# Patient Record
Sex: MALE | Race: WHITE | Employment: FULL TIME | URBAN - METROPOLITAN AREA
[De-identification: names, ages, dates, MRNs, and addresses within clinical notes are randomized per-mention and may not be internally consistent; named-entity substitution may affect disease eponyms.]

---

## 2017-05-24 ENCOUNTER — OFFICE VISIT (OUTPATIENT)
Dept: ORTHOPEDIC SURGERY | Age: 47
End: 2017-05-24

## 2017-05-24 VITALS
SYSTOLIC BLOOD PRESSURE: 124 MMHG | DIASTOLIC BLOOD PRESSURE: 92 MMHG | HEIGHT: 69 IN | HEART RATE: 90 BPM | BODY MASS INDEX: 31.1 KG/M2 | WEIGHT: 210 LBS

## 2017-05-24 DIAGNOSIS — M25.551 HIP PAIN, RIGHT: ICD-10-CM

## 2017-05-24 DIAGNOSIS — M16.11 PRIMARY OSTEOARTHRITIS OF RIGHT HIP: ICD-10-CM

## 2017-05-24 DIAGNOSIS — M76.31 ILIOTIBIAL BAND SYNDROME, RIGHT: Primary | ICD-10-CM

## 2017-05-24 PROBLEM — M76.30 ILIOTIBIAL BAND SYNDROME: Status: ACTIVE | Noted: 2017-05-24

## 2017-05-24 PROCEDURE — 73502 X-RAY EXAM HIP UNI 2-3 VIEWS: CPT | Performed by: PHYSICIAN ASSISTANT

## 2017-05-24 PROCEDURE — 99213 OFFICE O/P EST LOW 20 MIN: CPT | Performed by: PHYSICIAN ASSISTANT

## 2017-06-19 ENCOUNTER — OFFICE VISIT (OUTPATIENT)
Dept: ORTHOPEDIC SURGERY | Age: 47
End: 2017-06-19

## 2017-06-19 VITALS — HEIGHT: 69 IN | BODY MASS INDEX: 31.12 KG/M2 | WEIGHT: 210.1 LBS

## 2017-06-19 DIAGNOSIS — Z96.641 HISTORY OF TOTAL HIP ARTHROPLASTY, RIGHT: ICD-10-CM

## 2017-06-19 DIAGNOSIS — M24.552 HIP FLEXOR TIGHTNESS, LEFT: Primary | ICD-10-CM

## 2017-06-19 DIAGNOSIS — M16.11 PRIMARY OSTEOARTHRITIS OF RIGHT HIP: ICD-10-CM

## 2017-06-19 PROCEDURE — 99213 OFFICE O/P EST LOW 20 MIN: CPT | Performed by: ORTHOPAEDIC SURGERY

## 2017-06-19 RX ORDER — DEXAMETHASONE SODIUM PHOSPHATE 4 MG/ML
INJECTION, SOLUTION INTRA-ARTICULAR; INTRALESIONAL; INTRAMUSCULAR; INTRAVENOUS; SOFT TISSUE
Qty: 30 ML | Refills: 0 | Status: SHIPPED | OUTPATIENT
Start: 2017-06-19 | End: 2017-07-06

## 2017-06-20 ENCOUNTER — HOSPITAL ENCOUNTER (OUTPATIENT)
Dept: PHYSICAL THERAPY | Age: 47
Discharge: HOME | End: 2017-06-30
Admitting: ORTHOPAEDIC SURGERY

## 2017-07-03 ENCOUNTER — HOSPITAL ENCOUNTER (OUTPATIENT)
Dept: PHYSICAL THERAPY | Age: 47
Discharge: HOME | End: 2017-07-03
Admitting: ORTHOPAEDIC SURGERY

## 2017-07-06 DIAGNOSIS — M24.552 HIP FLEXOR TIGHTNESS, LEFT: Primary | ICD-10-CM

## 2017-07-06 RX ORDER — DEXAMETHASONE SODIUM PHOSPHATE 4 MG/ML
INJECTION, SOLUTION INTRA-ARTICULAR; INTRALESIONAL; INTRAMUSCULAR; INTRAVENOUS; SOFT TISSUE
Qty: 30 ML | Refills: 0 | Status: SHIPPED | OUTPATIENT
Start: 2017-07-06

## 2017-07-07 ENCOUNTER — HOSPITAL ENCOUNTER (OUTPATIENT)
Dept: PHYSICAL THERAPY | Age: 47
Discharge: HOME | End: 2017-07-07
Admitting: ORTHOPAEDIC SURGERY

## 2017-07-11 ENCOUNTER — HOSPITAL ENCOUNTER (OUTPATIENT)
Dept: PHYSICAL THERAPY | Age: 47
Discharge: HOME | End: 2017-07-11
Admitting: ORTHOPAEDIC SURGERY

## 2017-07-20 ENCOUNTER — HOSPITAL ENCOUNTER (OUTPATIENT)
Dept: PHYSICAL THERAPY | Age: 47
Discharge: HOME | End: 2017-07-20
Admitting: ORTHOPAEDIC SURGERY

## 2017-08-01 ENCOUNTER — HOSPITAL ENCOUNTER (OUTPATIENT)
Dept: PHYSICAL THERAPY | Age: 47
Discharge: HOME | End: 2017-08-01
Admitting: ORTHOPAEDIC SURGERY

## 2017-09-06 ENCOUNTER — HOSPITAL ENCOUNTER (OUTPATIENT)
Dept: PHYSICAL THERAPY | Age: 47
Discharge: HOME | End: 2017-09-30
Attending: ORTHOPAEDIC SURGERY | Admitting: ORTHOPAEDIC SURGERY

## 2017-09-06 NOTE — FLOWSHEET NOTE
Knee Extension Bilat. Ecc.                                Inv. Hamstring Curls Bilat. Ecc.                                Inv.          Soleus Press Bilat. Ecc.                            Inv.                                Ladders                 Square                Jump/Hop  Low                       Med.                       High                                                               Modality Ionto 120 mA to go  Proximal rectus Ionto 120 mA to go  Proximal rectus   Initials                             BMG LCG

## 2017-09-06 NOTE — FLOWSHEET NOTE
[facility], [address] [place]  Phone: [phone]  Fax [phone]    Physical Therapy Daily Treatment Note  Date:  2017    Patient Name:  Gila Squires    :  1970  MRN: [id number]  Restrictions/Precautions:    Medical/Treatment Diagnosis Information:  · Diagnosis: M16.11 (ICD-10-CM) - Primary osteoarthritis of right hip, Z96.641 (ICD-10-CM) - History of total hip arthroplasty, right, M24.552 (ICD-10-CM) - Hip flexor tightness, left  · Treatment Diagnosis: R hip pain P78.205  Insurance/Certification information:  PT Insurance Information: P  Physician Information:  Referring Practitioner: Gifty Menendez of care signed (Y/N):     Date of Patient follow up with Physician:     G-Code (if applicable):   Abhishek Brown   Date G-Code Applied:  17  PT G-Codes  Functional Assessment Tool Used: LEFS   Score: 28%  Functional Limitation: Mobility: Walking and moving around  Mobility: Walking and Moving Around Current Status (): At least 20 percent but less than 40 percent impaired, limited or restricted  Mobility: Walking and Moving Around Goal Status (): At least 1 percent but less than 20 percent impaired, limited or restricted    Progress Note: [x]  Yes  []  No  Next due by: Visit #10       Latex Allergy:  [x]NO      []YES  Preferred Language for Healthcare:   [x]English       []other:    Visit # Insurance Allowable   8 54     Pain level:  0-5 or 6 following golfing    SUBJECTIVE:  Pt feels his pain has gotten slightly worse again, despite feeling so good ~1 month ago. He isn't having pain going up/down steps or at rest, but mild pulling felt when walking ~1 mile and after golfing (he's back to ~2 to sometimes 3 days/week). He feels he's stopped being as consistent with his HEP (is still stretching and foam rolling) but is having trouble correctly performing his strengthening exercises consistently.  Pain is worse following activity, and improves with stretching and rest from golf/walking. He did get a gym membership so will have access to more machines as well. He would like another review of his HEP.     OBJECTIVE:   Observation: TTP proximal to mid rectus, psoas and iliacus, TFL   Test measurements:      RESTRICTIONS/PRECAUTIONS: hx R DHRUV (ant approach) 2015, hx posterior lumbar fusion 1999    Exercises/Interventions:     Therapeutic Ex Sets/sec Reps Notes   Pt ed: e                     R/L for HEP   3 way hip flexor [id number]\" x3 R/L ea on true-stretch today   Prone TrA with B hip abd  \" \"  with B hip abd/ER GVL   Prone plank with alt LE ext  Prone plank with alt LE hip flex/ERx10 R/L      SL plank with clamshell GVL   Side-stepping  In mini squat  Monster walks  Retro monster walks20'x2 ea GVL   Sl hip abd-rishi wall [id number] R/L    Bridges  SL bridges3\"x10 R/L    Bridge on ball   Bridge on ball with alt LE lift NV   Glider hip abd, D2 diagonal ext  2x10  GVL   Standing hip abd R/L   Initially red tband but stopped d/t inc'd hip flex/IR with fatigue   Foam Roller quads, ITB   manual   SLS hip hike 4\"2x10 R/L     Standing hip abd/ER iso into wall 10\"x5 R/L  Mirror for posture/not leaning   Quadruped firehydrant      Manual Intervention      Psoas MFR 1'x3     TPR R iliacus, TFL,  5'     STM proximal rectus 5'     stick rolling proximal to mid ITB, VL 5'     ART psoas, iliacus 2x30\" ea    Manual HS stretch  SL hip flexor stretch  Prone quad stretch-knee on 1/2 foam roll 30\"x330\"x3    NMR re-education      Multifidus lift-quadruped on 1/2 foam roll R,L; cueing for slight ant tilt for inc'd MfA activation   Prone TKE glute re-training 2 10 R,L   Prone alt LE ext/\"elongation\" 2x20                         Therapeutic Exercise and NMR EXR  [x] (14844) Provided verbal/tactile cueing for activities related to strengthening, flexibility, endurance, ROM for improvements in LE, proximal hip, and core control with self care, mobility, [] EVAL (LOW) 11305 (typically 20 minutes face-to-face)  [] EVAL (MOD) 67319 (typically 30 minutes face-to-face)  [] EVAL (HIGH) 63539 (typically 45 minutes face-to-face)  [] RE-EVAL     [x] GW(54626) x  1   [x] IONTO  [] NMR (72341) x      [] VASO  [x] Manual (06568) x  2    [] Other:  [] TA x       [] Mech Traction (46884)  [] ES(attended) (73307)      [] ES (un) (20519):     GOALS: Short Term Goals: To be achieved in: 2 weeks  1. Independent in HEP and progression per patient tolerance, in order to prevent re-injury. 2. Patient will have a decrease in pain to facilitate improvement in movement, function, and ADLs as indicated by Functional Deficits.     Long Term Goals: To be achieved in: 5-6 weeks  1. Disability index score of 10% or less for the LEFS to assist with reaching prior level of function. 2. Patient will demonstrate increased flexibility R hip flexors/quadriceps to allow for proper joint functioning as indicated by patients Functional Deficits. 3. Patient will demonstrate an increase in Strength to good proximal hip strength and control, within 5lb HHD in LE to allow for proper functional mobility as indicated by patients Functional Deficits: ability to ascend/descend steps with reciprocal pattern without limitation. 4. Patient will return to ambulating >10,000 steps/day without increased symptoms or restriction. 5. Pt will ambulate on hills/inclines without symptoms or restriction for ability to participate in 18 holes of golf. Progression Towards Functional goals:  [x] Patient is progressing as expected towards functional goals listed. [] Progression is slowed due to complexities listed. [] Progression has been slowed due to co-morbidities. [] Plan just implemented, too soon to assess goals progression  [] Other:     ASSESSMENT:  Pt with worse pain following activity (golf, mild inc with walking), inc'd c/o tightness in R anterior hip and prox mid to lat thigh.  His symptoms were much improved ~1 month ago; pt feels he's stopped being as consistent with HEP for flexibility and strength and started inc'd frequency of golfing and feels this is responsible for his flare-up. Pt will benefit from continued focus on flexibility and foam rolling, and reviewed and gave updated HEP for inc'd consistency of core/glute strengthening. Pt still tends to compensate with hip flexors with there-ex when he fatigues and needs consistent cueing for proper performance of there-ex. He has gotten relief in past sessions with manual tx, flexibility, glute/core-specific exercises and ionto over proximal rectus tendon, however if pain is persisting with inc'd focus on HEP, he may benefit from trial of TDN to improve anterior hip comfort. Pt is agreeable to plan, so will ask MD for referral.    Treatment/Activity Tolerance:  [x] Patient tolerated treatment well [] Patient limited by fatique  [] Patient limited by pain  [] Patient limited by other medical complications  [] Other:     Prognosis: [x] Good [] Fair  [] Poor    Patient Requires Follow-up: [x] Yes  [] No    PLAN: Ask for script for TDN. Progress R hip flexor flexibility, continue strength of hip rotators, abductors/extensors and con-contraction of MfA and TrA.   [x] Continue per plan of care [] Alter current plan (see comments)  [] Plan of care initiated [] Hold pending MD visit [] Discharge    Electronically signed by: Denver Bjork, PT, DPT

## 2017-10-03 ENCOUNTER — HOSPITAL ENCOUNTER (OUTPATIENT)
Dept: PHYSICAL THERAPY | Age: 47
Discharge: HOME | End: 2017-10-03
Admitting: ORTHOPAEDIC SURGERY

## 2017-10-03 DIAGNOSIS — M24.552 HIP FLEXOR TIGHTNESS, LEFT: Primary | ICD-10-CM

## 2017-10-03 NOTE — FLOWSHEET NOTE
[facility], [address]  Phone: [phone]  Fax [phone]    Physical Therapy Daily Treatment Note  Date:  10/3/2017    Patient Name:  Heena Gomez    :  1970  MRN: [id number]  Restrictions/Precautions:    Medical/Treatment Diagnosis Information:  · Diagnosis: M16.11 (ICD-10-CM) - Primary osteoarthritis of right hip, Z96.641 (ICD-10-CM) - History of total hip arthroplasty, right, M24.552 (ICD-10-CM) - Hip flexor tightness, left  · Treatment Diagnosis: R hip pain K65.268  Insurance/Certification information:  PT Insurance Information: P  Physician Information:  Referring Practitioner: Shala Bernard of care signed (Y/N):     Date of Patient follow up with Physician:     G-Code (if applicable):   Joby Max   Date G-Code Applied:  17  PT G-Codes  Functional Assessment Tool Used: LEFS   Score: 28%  Functional Limitation: Mobility: Walking and moving around  Mobility: Walking and Moving Around Current Status (): At least 20 percent but less than 40 percent impaired, limited or restricted  Mobility: Walking and Moving Around Goal Status (): At least 1 percent but less than 20 percent impaired, limited or restricted    Progress Note: [x]  Yes  []  No  Next due by: Visit #10       Latex Allergy:  [x]NO      []YES  Preferred Language for Healthcare:   [x]English       []other:    Visit # Insurance Allowable   9-prog note NV 54     Pain level:  1-2 after walking/golfing, but mod to severe pain following walking/standing 12 hours for 3 days in a row at work conference    SUBJECTIVE:  Pt reports he's been feeling well past few weeks since LV with HEP for strength/stretching and foam rolling. He was walking ~1-3 miles daily without inc'd pain, just tighthness in R anterior hip and low back after activity.  However, he had work conference 1 week ago where he had to walk and stand on concrete for 12 hours for 3 (09192) Provided manual therapy to mobilize LE, proximal hip and/or LS spine soft tissue/joints for the purpose of modulating pain, promoting relaxation,  increasing ROM, reducing/eliminating soft tissue swelling/inflammation/restriction, improving soft tissue extensibility and allowing for proper ROM for normal function with self care, mobility, lifting and ambulation. Modalities:  ionto \"to go\" patch 120 proximal rectus (5' set-up)    Charges:  Timed Code Treatment Minutes: 45   Total Treatment Minutes: 50 (ionto)     [] EVAL (LOW) 86497 (typically 20 minutes face-to-face)  [] EVAL (MOD) 78878 (typically 30 minutes face-to-face)  [] EVAL (HIGH) 64605 (typically 45 minutes face-to-face)  [] RE-EVAL     [x] EI(27490) x  1   [x] IONTO  [] NMR (32920) x      [] VASO  [x] Manual (30464) x  2    [] Other:  [] TA x       [] Mech Traction (29225)  [] ES(attended) (07365)      [] ES (un) (60826):     GOALS: Short Term Goals: To be achieved in: 2 weeks  1. Independent in HEP and progression per patient tolerance, in order to prevent re-injury. 2. Patient will have a decrease in pain to facilitate improvement in movement, function, and ADLs as indicated by Functional Deficits.     Long Term Goals: To be achieved in: 5-6 weeks  1. Disability index score of 10% or less for the LEFS to assist with reaching prior level of function. 2. Patient will demonstrate increased flexibility R hip flexors/quadriceps to allow for proper joint functioning as indicated by patients Functional Deficits. 3. Patient will demonstrate an increase in Strength to good proximal hip strength and control, within 5lb HHD in LE to allow for proper functional mobility as indicated by patients Functional Deficits: ability to ascend/descend steps with reciprocal pattern without limitation. 4. Patient will return to ambulating >10,000 steps/day without increased symptoms or restriction.    5. Pt will ambulate on hills/inclines without symptoms or restriction for ability to participate in 18 holes of golf. Progression Towards Functional goals:  [x] Patient is progressing as expected towards functional goals listed. [] Progression is slowed due to complexities listed. [] Progression has been slowed due to co-morbidities. [] Plan just implemented, too soon to assess goals progression  [] Other:     ASSESSMENT:  Pt again continues to have flare-ups of pain in R hip flexor after inc'd/prolonged activity, this time was standing/walking for 12 hour days, 3 days in a row. Pt reported that he only had 1 day of severe soreness in low back/R and and lat hip, and he improved quickly, but he's discouraged that he continues to have worsening pain. Educated pt that he has made excellent progress with pain and is doing better with HEP for strength and flexibility, but he can't tolerated excessive activity (as mentioned above) without expectation for some temporary inc'd inflammation. Given his hx of lumbar fusion and R DHRUV, he will likely need to continue with his consistency with his HEP and on the days he does need to perform prolonged standing/walking, he will need to make a point of more frequent rest breaks, options to sit, and stretching/foam rolling through the day. This visit, pt was more sore in R obliques, QL, and R glutes, so also worked on these muscle groups as well to aide recovery. Continued to recommend trial of TDN to improve anterior hip comfort. Treatment/Activity Tolerance:  [x] Patient tolerated treatment well [] Patient limited by fatique  [] Patient limited by pain  [] Patient limited by other medical complications  [] Other:     Prognosis: [x] Good [] Fair  [] Poor    Patient Requires Follow-up: [x] Yes  [] No    PLAN: Script request has been made to MD for TDN. Progress R hip flexor flexibility, continue strength of hip rotators, abductors/extensors and con-contraction of MfA and TrA.   [x] Continue per plan of care [] Alter current

## 2017-10-25 ENCOUNTER — HOSPITAL ENCOUNTER (OUTPATIENT)
Dept: PHYSICAL THERAPY | Age: 47
Discharge: HOME | End: 2017-10-25
Admitting: ORTHOPAEDIC SURGERY

## 2017-10-25 NOTE — FLOWSHEET NOTE
does extra walking/golf as reason his pain flares up (when this happens, inc'd rest, ice, gentle stretching and avoiding inc'd walking/biking/resisted hip flexion activities until pain is better again) 10'     Foam rolling TFL, quad, ITB, R glutes 5'              R/L for HEP   3 way hip flexor stretch Review for HEP      Prone TrA with B hip abd  \" \"  with B hip abd/ER GVL   Prone plank with alt LE ext  Prone plank with alt LE hip flex/ER    SL clamshell GVL   Side-stepping  In mini squat  Monster walks  Retro monster walks GVL  Review for HEP   Sl hip abd-rishi wall slide    Bridges  SL bridges with hip abd BVL BVL   Bridge on ball   Bridge on ball with alt LE lift NV   Glider hip abd, D2 diagonal ext  GVL   Standing hip abd R/L   Initially red tband but stopped d/t inc'd hip flex/IR with fatigue   Foam Roller quads, ITB   manual   SLS hip hike  Review for HEP   Standing hip abd/ER iso into wall  Review for HEP  Mirror for posture/not leaning   Quadruped firehydrant      Manual Intervention      Psoas MFR 1'x3     TPR R iliacus, TFL, glute min and med 15'     STM proximal rectus  STM distal QL along iliac crest and obliques 10'     stick rolling proximal to mid ITB, VL   Pt performed following manual tx   ART R hip IR/ERs 3x30\" ea    Manual HS stretch  SL hip flexor stretch  Prone quad stretch-knee on 1/2 foam roll     NMR re-education      Multifidus lift-quadruped on 1/2 foam roll R,L; cueing for slight ant tilt for inc'd MfA activation   Prone TKE glute re-training R,L   Prone alt LE ext/\"elongation\"                          Therapeutic Exercise and NMR EXR  [x] (23145) Provided verbal/tactile cueing for activities related to strengthening, flexibility, endurance, ROM for improvements in LE, proximal hip, and core control with self care, mobility, lifting, ambulation.   [x] (94759) Provided verbal/tactile cueing for activities related to improving balance, coordination, kinesthetic sense, posture, motor skill, IONTO  [] NMR (96741) x      [] VASO  [x] Manual (58066) x  2    [] Other:  [] TA x       [] Mech Traction (64071)  [] ES(attended) (18760)      [] ES (un) (73875):     GOALS: Short Term Goals: To be achieved in: 2 weeks  1. Independent in HEP and progression per patient tolerance, in order to prevent re-injury. 2. Patient will have a decrease in pain to facilitate improvement in movement, function, and ADLs as indicated by Functional Deficits.     Long Term Goals: To be achieved in: 5-6 weeks  1. Disability index score of 10% or less for the LEFS to assist with reaching prior level of function. 2. Patient will demonstrate increased flexibility R hip flexors/quadriceps to allow for proper joint functioning as indicated by patients Functional Deficits. 3. Patient will demonstrate an increase in Strength to good proximal hip strength and control, within 5lb HHD in LE to allow for proper functional mobility as indicated by patients Functional Deficits: ability to ascend/descend steps with reciprocal pattern without limitation. 4. Patient will return to ambulating >10,000 steps/day without increased symptoms or restriction. 5. Pt will ambulate on hills/inclines without symptoms or restriction for ability to participate in 18 holes of golf. Progression Towards Functional goals:  [x] Patient is progressing as expected towards functional goals listed. [] Progression is slowed due to complexities listed. [] Progression has been slowed due to co-morbidities. [] Plan just implemented, too soon to assess goals progression  [] Other:     ASSESSMENT:  See prog note    Treatment/Activity Tolerance:  [x] Patient tolerated treatment well [] Patient limited by fatique  [] Patient limited by pain  [] Patient limited by other medical complications  [] Other:     Prognosis: [x] Good [] Fair  [] Poor    Patient Requires Follow-up: [x] Yes  [] No    PLAN: Has script from MD for TDN.  Pt will continue HEP for stretching and strengthening and f/u in PT for TDN and STM prn to manage soft tissue discomfort following inc'd activity.   [x] Continue per plan of care [x] Alter current plan (see comments)  [] Plan of care initiated [] Hold pending MD visit [] Discharge    Electronically signed by: Toro Sarah, PT, DPT

## 2017-10-25 NOTE — PROGRESS NOTES
[facility], [address], [address] [place] [address]  Phone: [phone]  Fax [phone]     Physical Therapy Re-Certification Plan of Care    Dear Referring Practitioner: Conchis Dodson,    We had the pleasure of treating the following patient for physical therapy services at [address] [place] [address]. A summary of our findings can be found in the updated assessment below. This includes our plan of care. If you have any questions or concerns regarding these findings, please do not hesitate to contact me at the office phone number checked above. Thank you for the referral.     Physician Signature:________________________________Date:__________________  By signing above, therapists plan is approved by physician      Patient: Claribel Donald   : 1970   MRN: [id number]  Referring Physician: Referring Practitioner: Conchis Dodson      Evaluation Date: 10/25/2017      Medical Diagnosis Information:  · Diagnosis: M16.11 (ICD-10-CM) - Primary osteoarthritis of right hip, Z96.641 (ICD-10-CM) - History of total hip arthroplasty, right, M24.552 (ICD-10-CM) - Hip flexor tightness, left   · Treatment Diagnosis: R hip pain M25.551   Insurance information: PT Insurance Information: [facility]    Date Range:17-10/25/17  Total visits:10      G-Codes: (if applicable) PT G-Codes  Functional Assessment Tool Used: LEFS  Functional Limitation: Mobility: Walking and moving around  Mobility: Walking and Moving Around Current Status (): At least 1 percent but less than 20 percent impaired, limited or restricted  Mobility: Walking and Moving Around Goal Status (): At least 1 percent but less than 20 percent impaired, limited or restricted   Functional Index used: LEFS    SUBJECTIVE: Pt has been walking 1-2 miles daily, working out at gym 3+x/week for core and LE strength (and bike, elliptical) and playing golf 1x/week.  He came back to PT today d/t inc'd anterior hip soreness after playing 18 holes 3 days in a row. He is sore again with going up steps, biking, and walking and is frustrated that his pain flares up when he does way more than his normal activities. Current Pain Scale: 0 past 4-5 sessions over past 2 months) -2 (few days following inc'd walking and golf 3 days in a row)/10    Type: []Constant   [x]Intermitment  []Radiating [x]Localized  []other:     Functional Limitations: []Sitting []Standing [x]Walking-prolonged    []Squatting []Stairs            []ADL's  []Driving [x]Sports/Recreation-after 3 days golfing, inc'd pain  []Other:      OBJECTIVE:    ROM:  hip flexion  A 110  P 120   Hip ER A  38   P 70  Hip IR   A  27   P 30    MMT:  Hip flexion 5/5 R/L (min discomfort at R proximal rectus testing this date but not past 5+ sessions)  Hip extension 5/5 B  Hip abduction 5/5 B  Hip ER 5/5 B  Hip IR 4+/5 R and 5/5 L    Gait: WNL    Joint mobility:    [x]Normal-hip after R DHRUV    [x]Hypo restricted lumbar spine extension (has lumbar fusion)   []Hyper    Palpation: still min TTP R proximal rectus, iliacus, distal psoas, TFL, glute min and med    Orthopedic Tests: - MITCH (just tightness), - Magdaline Cumber test [facility] now (90 deg knee flexion), HS 78 deg SLR    OTHER:       ASSESSMENT: Pt has residual soft tissue trigger points limiting comfort/inc'd soreness following activity that may benefit from TDN. He has full function and WFL strength/ROM and flexibility now, but when performs activity (golf, walking) over his normal routine, he has flare of up symptoms. Pt can likely manage this now with massage therapy (recommendation give) and trial of TDN session.      Response to Treatment:   [x]Patient is responding well to treatment and improvement is noted with regards  to goals   []Patient should continue to improve in reasonable time if they continue HEP   []Patient has plateaued and is no longer responding to skilled PT intervention    []Patient is getting worse restriction. Met, inc'd pain if he ambulates over ~2-3 miles multiple days in row  5. Pt will ambulate on hills/inclines without symptoms or restriction for ability to participate in 18 holes of golf. Met, but inc'd pain when playing golf 3 straight days     Rehab Potential:   []Excellent   [x] Good   [] Fair   [] Poor    Plan of Care:  [x] Continue Current Therapy Intervention but added TDN to POC (script written by MD and in EMR)  Frequency/Duration:  1 days per week for 4 Weeks specifically for TDN and soft tissue massage:  HEP instruction:   1. Ther ex including: strength training, ROM, NMR and proprioception for the proximal hip, core and Lower extremity  2. Manual therapy as indicated including Dry Needling/IASTM, STM, PROM, Gr I-IV mobilizations, spinal mobilization/manipulation. 3. Modalities as needed including: thermal agents, E-stim, US, iontophoresis as indicated. 4. Patient education on joint protection, activity modification, progression of HEP.     Electronically signed by:  Liv Rodgers PT, DPT

## 2017-11-01 ENCOUNTER — HOSPITAL ENCOUNTER (OUTPATIENT)
Dept: PHYSICAL THERAPY | Age: 47
Discharge: HOME | End: 2017-11-30
Attending: ORTHOPAEDIC SURGERY | Admitting: ORTHOPAEDIC SURGERY

## 2017-11-03 ENCOUNTER — HOSPITAL ENCOUNTER (OUTPATIENT)
Dept: PHYSICAL THERAPY | Age: 47
Discharge: HOME | End: 2017-11-03
Admitting: ORTHOPAEDIC SURGERY

## 2017-11-03 NOTE — FLOWSHEET NOTE
(typically 30 minutes face-to-face)  [] EVAL (HIGH) 29413 (typically 45 minutes face-to-face)  [] RE-EVAL      [x] HA(76199) x  1   [] IONTO  [] NMR (32078) x      [] VASO  [x] Manual (49184) x  2    [] Other:  [] TA x       [] Mech Traction (24672)  [] ES(attended) (51971)      [] ES (un) (31617):     GOALS: Short Term Goals: To be achieved in: 2 weeks  1. Independent in HEP and progression per patient tolerance, in order to prevent re-injury. 2. Patient will have a decrease in pain to facilitate improvement in movement, function, and ADLs as indicated by Functional Deficits.     Long Term Goals: To be achieved in: 5-6 weeks  1. Disability index score of 10% or less for the LEFS to assist with reaching prior level of function. 2. Patient will demonstrate increased flexibility R hip flexors/quadriceps to allow for proper joint functioning as indicated by patients Functional Deficits. 3. Patient will demonstrate an increase in Strength to good proximal hip strength and control, within 5lb HHD in LE to allow for proper functional mobility as indicated by patients Functional Deficits: ability to ascend/descend steps with reciprocal pattern without limitation. 4. Patient will return to ambulating >10,000 steps/day without increased symptoms or restriction. 5. Pt will ambulate on hills/inclines without symptoms or restriction for ability to participate in 18 holes of golf. Progression Towards Functional goals:  [x] Patient is progressing as expected towards functional goals listed. [] Progression is slowed due to complexities listed. [] Progression has been slowed due to co-morbidities. [] Plan just implemented, too soon to assess goals progression  [] Other:     ASSESSMENT:  Tolerated TDN well. Restriction noted with needle deep at muscle attachment to bone in TFL, glut min. Scar tissue probable component to stiffness, as well as glut med, min, RF, HF.   Assess for eccentric HF weakness.     Treatment/Activity Tolerance:  [x] Patient tolerated treatment well [] Patient limited by fatique  [] Patient limited by pain  [] Patient limited by other medical complications  [] Other:     Prognosis: [x] Good [] Fair  [] Poor    Patient Requires Follow-up: [x] Yes  [] No    PLAN:   [x] Continue per plan of care [x] Alter current plan (see comments): 1x/wk 4 wks for TDN  [] Plan of care initiated [] Hold pending MD visit [] Discharge    Electronically signed by: Carolina Ro, PT, DPT

## 2017-11-13 ENCOUNTER — HOSPITAL ENCOUNTER (OUTPATIENT)
Dept: PHYSICAL THERAPY | Age: 47
Discharge: HOME | End: 2017-11-13
Admitting: ORTHOPAEDIC SURGERY

## 2017-11-13 NOTE — FLOWSHEET NOTE
[facility], [address]  Phone: [phone]  Fax [phone]    Physical Therapy Daily Treatment Note  Date:  2017    Patient Name:  Ayaz Reese    :  1970  MRN: [id number]  Restrictions/Precautions:    Medical/Treatment Diagnosis Information:  · Diagnosis: M16.11 (ICD-10-CM) - Primary osteoarthritis of right hip, Z96.641 (ICD-10-CM) - History of total hip arthroplasty, right, M24.552 (ICD-10-CM) - Hip flexor tightness, left  · Treatment Diagnosis: R hip pain A01.936  Insurance/Certification information:  PT Insurance Information: MVP  Physician Information:  Referring Practitioner: Cristal Paul of care signed (Y/N):     Date of Patient follow up with Physician:     G-Code (if applicable):   CI   Date G-Code Applied:  10/25/17  PT G-Codes  Functional Assessment Tool Used: LEFS   Score: 15%  Functional Limitation: Mobility: Walking and moving around  Mobility: Walking and Moving Around Current Status (): At least 20 percent but less than 40 percent impaired, limited or restricted  Mobility: Walking and Moving Around Goal Status (): At least 1 percent but less than 20 percent impaired, limited or restricted    Progress Note: [x]  Yes  []  No  Next due by: Visit #  14     Latex Allergy:  [x]NO      []YES  Preferred Language for Healthcare:   [x]English       []other:    Visit # Insurance Allowable   12 54     Pain level:      SUBJECTIVE:  The needling helped a lot. I felt like I was walking better and wasn't nearly as tight. I went to the [facility] game and cold tightened up my back and my hip, but I stretched and it seemed to go away. I feel an improvement, but still tight.   OBJECTIVE:   Observation: Test measurements:      RESTRICTIONS/PRECAUTIONS: hx R DHURV (ant approach) , hx posterior lumbar fusion    Consent signed    Muscle  Needle Size Technique Notes IES   Site 1 TFL x3 0.35 x 75mm [] Pistoning / []  Threading  []  Winding/Coning Reported LTR []    Site 2 Glut med x2 0.40 x 100mm [] Pistoning / []  Threading  []  Winding/Coning LTR []    Site 3 Glut min x2 0.40 x 100mm [] Pistoning / []  Threading  []  Winding/Coning LTR []    Site 4 VL x2 0.35 x 75mm [] Pistoning / []  Threading  []  Winding/Coning  []    Site 5 RF x2 0.35 x 75mm [] Pistoning / []  Threading  []  Winding/Coning  []    Site 6 Glut med, glut min, TFL x2 0.40 x 100mm [x] Pistoning / []  Threading  []  Winding/Coning  [x]    Site 7                    [] Pistoning / []  Threading  []  Winding/Coning  []    Site 8                    [] Pistoning / []  Threading  []  Winding/Coning  []    Site 9                    [] Pistoning / []  Threading  []  Winding/Coning  []    Site 10                    [] Pistoning / []  Threading  []  Winding/Coning  []      **The above techniques were used to restore functional range of motion, reduce muscle spasm, and induce healing in the corresponding musculature by means of intramuscular mobilization. Clean Technique was utilized today while applying the Dry needling treatment. The treatment sites where cleaned with 70% solution of isopropyl alcohol. **    Attended electrical stimulation was applied in conjunction with dry needling to the sites listed in the chart above to help reduce muscle spasm and interrupt the pain cycle: 12 min at low frequency (1-20Hz), fine frequency (4Hz), low intensity (0-36mA), output 3.5 volts. (93478)       ** Educated patient on anatomy, trigger point etiology, expectations for TDN (bruising, soreness, etc), outcomes, and recommendations for exercise.  **      Exercises/Interventions:     Therapeutic Ex Sets/sec Reps Notes   Review HEP: flexibiltiy core/hip strength, use of PT and massage therapy for TDN/STM as needed when he has inc'd symptoms, relation of his \"usual\" routine and no inc'd pain vs inc'd discomfort when he does extra walking/golf as reason his pain flares up (when this happens, inc'd rest, ice, gentle stretching and avoiding inc'd walking/biking/resisted hip flexion activities until pain is better again)  TDN/HF stretching options/scar   massage options      Foam rolling TFL, quad, ITB, R glutes 5'              R/L for HEP   3 way hip flexor stretch Review for HEP      Prone TrA with B hip abd  \" \"  with B hip abd/ER GVL   Prone plank with alt LE ext  Prone plank with alt LE hip flex/ER    SL clamshell GVL   Side-stepping  In mini squat  Monster walks  Retro monster walks GVL  Review for HEP   Sl hip abd-rishi wall slide    Bridges  SL bridges with hip abd BVL BVL   Bridge on ball   Bridge on ball with alt LE lift NV   Glider hip abd, D2 diagonal ext  GVL   Standing hip abd R/L   Initially red tband but stopped d/t inc'd hip flex/IR with fatigue   Foam Roller quads, ITB   manual   SLS hip hike  Review for HEP   Standing hip abd/ER iso into wall  Review for HEP  Mirror for posture/not leaning   Bike 5 min     Supine HF/quad stretch :30x4  With belt   SL HF/quad stretch with bolster support :30x4     Quadruped firehydrant      Manual Intervention      TDN/active release in SL gluts and TFL 30 min     LE stretch/ 8 min      Psoas MFR 1'x3     TPR R iliacus, TFL, glute min and med 15'     STM proximal rectus  STM distal QL along iliac crest and obliques 10'     stick rolling proximal to mid ITB, VL   Pt performed following manual tx   ART R hip IR/ERs 3x30\" ea    Manual HS stretch  SL hip flexor stretch  Prone quad stretch-knee on 1/2 foam roll     NMR re-education      Multifidus lift-quadruped on 1/2 foam roll R,L; cueing for slight ant tilt for inc'd MfA activation   Prone TKE glute re-training R,L   Prone alt LE ext/\"elongation\"          Glider posterior, lateral, rotation x15 each  B             Therapeutic Exercise and NMR EXR  [x] (95513) Provided verbal/tactile cueing for activities related to strengthening, flexibility, endurance, ROM for improvements in LE, proximal hip, and core control with self care, mobility, lifting, ambulation. [x] (19849) Provided verbal/tactile cueing for activities related to improving balance, coordination, kinesthetic sense, posture, motor skill, proprioception  to assist with LE, proximal hip, and core control in self care, mobility, lifting, ambulation and eccentric single leg control. NMR and Therapeutic Activities:    [] (52192 or 87250) Provided verbal/tactile cueing for activities related to improving balance, coordination, kinesthetic sense, posture, motor skill, proprioception and motor activation to allow for proper function of core, proximal hip and LE with self care and ADLs  [] (98526) Gait Re-education- Provided training and instruction to the patient for proper LE, core and proximal hip recruitment and positioning and eccentric body weight control with ambulation re-education including up and down stairs     Home Exercise Program:    [x] (91262) Reviewed/Progressed HEP activities related to strengthening, flexibility, endurance, ROM of core, proximal hip and LE for functional self-care, mobility, lifting and ambulation/stair navigation   [] (40738)Reviewed/Progressed HEP activities related to improving balance, coordination, kinesthetic sense, posture, motor skill, proprioception of core, proximal hip and LE for self care, mobility, lifting, and ambulation/stair navigation      Manual Treatments:  PROM / STM / Oscillations-Mobs:  G-I, II, III, IV (PA's, Inf., Post.)  [x] (86835) Provided manual therapy to mobilize LE, proximal hip and/or LS spine soft tissue/joints for the purpose of modulating pain, promoting relaxation,  increasing ROM, reducing/eliminating soft tissue swelling/inflammation/restriction, improving soft tissue extensibility and allowing for proper ROM for normal function with self care, mobility, lifting and ambulation.      Modalities:    Charges:  Timed Code Treatment Minutes: 54   Total Treatment Minutes: 54     [] EVAL (LOW) 02953 (typically 20 minutes face-to-face)  [] EVAL (MOD) 61323 (typically 30 minutes face-to-face)  [] EVAL (HIGH) 43749 (typically 45 minutes face-to-face)  [] RE-EVAL      [x] TN(13409) x  1   [] IONTO  [] NMR (90266) x      [] VASO  [x] Manual (03777) x  2    [] Other:  [] TA x       [] Mech Traction (94114)  [x] ES(attended) (95030)      [] ES (un) (36477):     GOALS: Short Term Goals: To be achieved in: 2 weeks  1. Independent in HEP and progression per patient tolerance, in order to prevent re-injury. 2. Patient will have a decrease in pain to facilitate improvement in movement, function, and ADLs as indicated by Functional Deficits.     Long Term Goals: To be achieved in: 5-6 weeks  1. Disability index score of 10% or less for the LEFS to assist with reaching prior level of function. 2. Patient will demonstrate increased flexibility R hip flexors/quadriceps to allow for proper joint functioning as indicated by patients Functional Deficits. 3. Patient will demonstrate an increase in Strength to good proximal hip strength and control, within 5lb HHD in LE to allow for proper functional mobility as indicated by patients Functional Deficits: ability to ascend/descend steps with reciprocal pattern without limitation. 4. Patient will return to ambulating >10,000 steps/day without increased symptoms or restriction. 5. Pt will ambulate on hills/inclines without symptoms or restriction for ability to participate in 18 holes of golf. Progression Towards Functional goals:  [x] Patient is progressing as expected towards functional goals listed. [] Progression is slowed due to complexities listed. [] Progression has been slowed due to co-morbidities. [] Plan just implemented, too soon to assess goals progression  [] Other:     ASSESSMENT:  Tolerated TDN well. Continued deep restriction noted with needle at muscle attachment to bone in TFL, glut min.   Not as significant of

## 2017-11-27 ENCOUNTER — HOSPITAL ENCOUNTER (OUTPATIENT)
Dept: PHYSICAL THERAPY | Age: 47
Discharge: HOME | End: 2017-11-27
Admitting: ORTHOPAEDIC SURGERY

## 2017-11-27 NOTE — FLOWSHEET NOTE
quad, ITB, R glutes               R/L for HEP   3 way hip flexor stretch Review for HEP      Prone TrA with B hip abd  \" \"  with B hip abd/ER GVL   Prone plank with alt LE ext  Prone plank with alt LE hip flex/ER    SL clamshell GVL   Side-stepping  In mini squat  Monster walks  Retro monster walks GVL  Review for HEP   Sl hip abd-rishi wall slide    Bridges  SL bridges with hip abd BVL BVL   Bridge on ball   Bridge on ball with alt LE lift NV   Glider hip abd, D2 diagonal ext  GVL   Standing hip abd R/L   Initially red tband but stopped d/t inc'd hip flex/IR with fatigue   Foam Roller quads, ITB   manual   SLS hip hike  Review for HEP   Standing hip abd/ER iso into wall  Review for HEP  Mirror for posture/not leaning   Dynamic HF and t-spine stretch at wall x15   B   ecc HF SLR 2x10 2#    Deep bridge off table + ADD 2x10     1/2 kneel HF stretch BUE OH/L SB x10 each     Bike 5 min      With belt       Quadruped firehydrant      Manual Intervention      TDN/active release in SL gluts and TFL 30 min     MFD incision x2 2 min each supine, mod Silver position     LE stretch/ 8 min      Psoas MFR 1'x3     TPR R iliacus, TFL, glute min and med 15'     STM proximal rectus  STM distal QL along iliac crest and obliques 10'     stick rolling proximal to mid ITB, VL   Pt performed following manual tx   ART R hip IR/ERs 3x30\" ea    Manual HS stretch  SL hip flexor stretch  Prone quad stretch-knee on 1/2 foam roll     NMR re-education      Multifidus lift-quadruped on 1/2 foam roll R,L; cueing for slight ant tilt for inc'd MfA activation   Prone TKE glute re-training R,L   Prone alt LE ext/\"elongation\"          Glider posterior, lateral, rotation x15 each  B             Therapeutic Exercise and NMR EXR  [x] (09222) Provided verbal/tactile cueing for activities related to strengthening, flexibility, endurance, ROM for improvements in LE, proximal hip, and core control with self care, mobility, lifting, ambulation.   [x] (83280) Provided verbal/tactile cueing for activities related to improving balance, coordination, kinesthetic sense, posture, motor skill, proprioception  to assist with LE, proximal hip, and core control in self care, mobility, lifting, ambulation and eccentric single leg control. NMR and Therapeutic Activities:    [] (68087 or 19028) Provided verbal/tactile cueing for activities related to improving balance, coordination, kinesthetic sense, posture, motor skill, proprioception and motor activation to allow for proper function of core, proximal hip and LE with self care and ADLs  [] (87558) Gait Re-education- Provided training and instruction to the patient for proper LE, core and proximal hip recruitment and positioning and eccentric body weight control with ambulation re-education including up and down stairs     Home Exercise Program:    [x] (85678) Reviewed/Progressed HEP activities related to strengthening, flexibility, endurance, ROM of core, proximal hip and LE for functional self-care, mobility, lifting and ambulation/stair navigation   [] (63875)Reviewed/Progressed HEP activities related to improving balance, coordination, kinesthetic sense, posture, motor skill, proprioception of core, proximal hip and LE for self care, mobility, lifting, and ambulation/stair navigation      Manual Treatments:  PROM / STM / Oscillations-Mobs:  G-I, II, III, IV (PA's, Inf., Post.)  [x] (20494) Provided manual therapy to mobilize LE, proximal hip and/or LS spine soft tissue/joints for the purpose of modulating pain, promoting relaxation,  increasing ROM, reducing/eliminating soft tissue swelling/inflammation/restriction, improving soft tissue extensibility and allowing for proper ROM for normal function with self care, mobility, lifting and ambulation.      Modalities:    Charges:  Timed Code Treatment Minutes: 60   Total Treatment Minutes: 60     [] EVAL (LOW) 85087 (typically 20 minutes face-to-face)  [] EVAL (MOD) 25037 (typically 30 minutes face-to-face)  [] EVAL (HIGH) 50982 (typically 45 minutes face-to-face)  [] RE-EVAL      [x] LT(00132) x  2   [] IONTO  [x] NMR (50182) x  1   [] VASO  [x] Manual (32520) x  1    [] Other:  [] TA x       [] Mech Traction (95799)  [] ES(attended) (39418)      [] ES (un) (30137):     GOALS: Short Term Goals: To be achieved in: 2 weeks  1. Independent in HEP and progression per patient tolerance, in order to prevent re-injury. 2. Patient will have a decrease in pain to facilitate improvement in movement, function, and ADLs as indicated by Functional Deficits.     Long Term Goals: To be achieved in: 5-6 weeks  1. Disability index score of 10% or less for the LEFS to assist with reaching prior level of function. 2. Patient will demonstrate increased flexibility R hip flexors/quadriceps to allow for proper joint functioning as indicated by patients Functional Deficits. 3. Patient will demonstrate an increase in Strength to good proximal hip strength and control, within 5lb HHD in LE to allow for proper functional mobility as indicated by patients Functional Deficits: ability to ascend/descend steps with reciprocal pattern without limitation. 4. Patient will return to ambulating >10,000 steps/day without increased symptoms or restriction. 5. Pt will ambulate on hills/inclines without symptoms or restriction for ability to participate in 18 holes of golf. Progression Towards Functional goals:  [x] Patient is progressing as expected towards functional goals listed. [] Progression is slowed due to complexities listed. [] Progression has been slowed due to co-morbidities. [] Plan just implemented, too soon to assess goals progression  [] Other:     ASSESSMENT:  Improved spasm with palpation in TFL, gluts. Added eccentric HF strengthening and thoracic mobilization with hip extension, as well as glut bridge. Good response to treatment. Continue TDN prn.     Treatment/Activity Tolerance:  [x] Patient tolerated treatment well [] Patient limited by fatique  [] Patient limited by pain  [] Patient limited by other medical complications  [] Other:     Prognosis: [x] Good [] Fair  [] Poor    Patient Requires Follow-up: [x] Yes  [] No    PLAN:   [x] Continue per plan of care [x] Alter current plan (see comments): 1x/wk 4 wks for TDN  [] Plan of care initiated [] Hold pending MD visit [] Discharge    Electronically signed by: Deborah Ward, DPT [id number]

## 2017-12-01 ENCOUNTER — HOSPITAL ENCOUNTER (OUTPATIENT)
Dept: PHYSICAL THERAPY | Age: 47
Discharge: HOME | End: 2017-12-31
Attending: ORTHOPAEDIC SURGERY | Admitting: ORTHOPAEDIC SURGERY

## 2017-12-19 ENCOUNTER — HOSPITAL ENCOUNTER (OUTPATIENT)
Dept: PHYSICAL THERAPY | Age: 47
Discharge: HOME | End: 2017-12-19
Admitting: ORTHOPAEDIC SURGERY

## 2017-12-19 NOTE — FLOWSHEET NOTE
[facility], [address]  Phone: [phone]  Fax [phone]    Physical Therapy Daily Treatment Note  Date:  2017    Patient Name:  Azeem Moore    :  1970  MRN: [id number]  Restrictions/Precautions:    Medical/Treatment Diagnosis Information:  · Diagnosis: M16.11 (ICD-10-CM) - Primary osteoarthritis of right hip, Z96.641 (ICD-10-CM) - History of total hip arthroplasty, right, M24.552 (ICD-10-CM) - Hip flexor tightness, left  · Treatment Diagnosis: R hip pain G62.664  Insurance/Certification information:  PT Insurance Information: P  Physician Information:  Referring Practitioner: Tomeka Leiva of care signed (Y/N):     Date of Patient follow up with Physician:     G-Code (if applicable):   CI   Date G-Code Applied:  10/25/17  PT G-Codes  Functional Assessment Tool Used: LEFS   Score: 15%  Functional Limitation: Mobility: Walking and moving around  Mobility: Walking and Moving Around Current Status (): At least 20 percent but less than 40 percent impaired, limited or restricted  Mobility: Walking and Moving Around Goal Status (): At least 1 percent but less than 20 percent impaired, limited or restricted    Progress Note: [x]  Yes  []  No  Next due by: Visit #  14     Latex Allergy:  [x]NO      []YES  Preferred Language for Healthcare:   [x]English       []other:    Visit # Insurance Allowable   14 54     Pain level:      SUBJECTIVE:  I have been walking more without pain. I haven't had the sharp pain in 1 month. It aches with standing after sitting for a while, but after a few steps, it improves.   OBJECTIVE:   Observation: Test measurements:  3D maps- decreased mobility in sp anterior reach; nearly symmetrical fp and tp, but still restricted; dnd stability  Seated HF MMT 4    RESTRICTIONS/PRECAUTIONS: hx R DHRUV (ant approach) , hx posterior lumbar fusion    Consent signed rest, ice, gentle stretching and avoiding inc'd walking/biking/resisted hip flexion activities until pain is better again)  TDN/HF stretching options/scar   massage options      Foam rolling TFL, quad, ITB, R glutes               R/L for HEP   3 way hip flexor stretch Review for HEP      Prone TrA with B hip abd  \" \"  with B hip abd/ER GVL   Prone plank with alt LE ext  Prone plank with alt LE hip flex/ER    SL clamshell GVL   Side-stepping  In mini squat  Monster walks  Retro monster walks10'x1 ea GVL  Review for HEP   Sl hip abd-rishi wall slide    Bridges  SL bridges with hip abd BVL BVL   Bridge on ball   Bridge on ball with alt LE lift NV   Glider hip abd, D2 diagonal ext  GVL   Standing hip abd R/L   Initially red tband but stopped d/t inc'd hip flex/IR with fatigue   Foam Roller quads, ITB   manual   SLS hip hike  Review for HEP   Standing hip abd/ER iso into wall  Review for HEP  Mirror for posture/not leaning   Dynamic HF and t-spine stretch at wall x15   RLE moving for SB and rotation   ecc HF SLR 2x10 2# HEP   Deep bridge off table + ADD 2x10     Standing ITB stretch at wall :30x4     1/2 kneel HF stretch BUE OH/L SB x10 each  Standing quad stretch this visit   Bike 5 min      With belt       Quadruped hip ext x20 R/L     Manual Intervention      TDN/active release in SL gluts and TFL 30 min     MFD incision x2      LE stretch/ 8 min      Psoas MFR 1'x3     TPR R iliacus, TFL, glute min and med 15'     STM proximal rectus  STM distal QL along iliac crest and obliques 10'     stick rolling proximal to mid ITB, VL   Pt performed following manual tx   ART R hip IR/ERs 3x30\" ea    Manual HS stretch  SL hip flexor stretch  Prone quad stretch-knee on 1/2 foam roll     NMR re-education      Multifidus lift-quadruped on 1/2 foam roll R,L; cueing for slight ant tilt for inc'd MfA activation   Prone TKE glute re-training R,L   Prone alt LE ext/\"elongation\"          Glider posterior, lateral, rotation x15 each  B Therapeutic Exercise and NMR EXR  [x] (50878) Provided verbal/tactile cueing for activities related to strengthening, flexibility, endurance, ROM for improvements in LE, proximal hip, and core control with self care, mobility, lifting, ambulation. [x] (54076) Provided verbal/tactile cueing for activities related to improving balance, coordination, kinesthetic sense, posture, motor skill, proprioception  to assist with LE, proximal hip, and core control in self care, mobility, lifting, ambulation and eccentric single leg control.      NMR and Therapeutic Activities:    [] (51786 or 44349) Provided verbal/tactile cueing for activities related to improving balance, coordination, kinesthetic sense, posture, motor skill, proprioception and motor activation to allow for proper function of core, proximal hip and LE with self care and ADLs  [] (81013) Gait Re-education- Provided training and instruction to the patient for proper LE, core and proximal hip recruitment and positioning and eccentric body weight control with ambulation re-education including up and down stairs     Home Exercise Program:    [x] (26862) Reviewed/Progressed HEP activities related to strengthening, flexibility, endurance, ROM of core, proximal hip and LE for functional self-care, mobility, lifting and ambulation/stair navigation   [] (95800)Reviewed/Progressed HEP activities related to improving balance, coordination, kinesthetic sense, posture, motor skill, proprioception of core, proximal hip and LE for self care, mobility, lifting, and ambulation/stair navigation      Manual Treatments:  PROM / STM / Oscillations-Mobs:  G-I, II, III, IV (PA's, Inf., Post.)  [x] (92388) Provided manual therapy to mobilize LE, proximal hip and/or LS spine soft tissue/joints for the purpose of modulating pain, promoting relaxation,  increasing ROM, reducing/eliminating soft tissue swelling/inflammation/restriction, improving soft tissue extensibility and allowing for proper ROM for normal function with self care, mobility, lifting and ambulation. Modalities:    Charges:  Timed Code Treatment Minutes: 60   Total Treatment Minutes: 60     [] EVAL (LOW) 98326 (typically 20 minutes face-to-face)  [] EVAL (MOD) 22867 (typically 30 minutes face-to-face)  [] EVAL (HIGH) 88324 (typically 45 minutes face-to-face)  [] RE-EVAL      [x] YG(87366) x  1   [] IONTO  [] NMR (27337) x      [] VASO  [x] Manual (74599) x  2    [] Other:  [] TA x       [] Mech Traction (49108)  [x] ES(attended) (40534)      [] ES (un) (35370):     GOALS: Short Term Goals: To be achieved in: 2 weeks  1. Independent in HEP and progression per patient tolerance, in order to prevent re-injury. 2. Patient will have a decrease in pain to facilitate improvement in movement, function, and ADLs as indicated by Functional Deficits.     Long Term Goals: To be achieved in: 5-6 weeks  1. Disability index score of 10% or less for the LEFS to assist with reaching prior level of function. 2. Patient will demonstrate increased flexibility R hip flexors/quadriceps to allow for proper joint functioning as indicated by patients Functional Deficits. 3. Patient will demonstrate an increase in Strength to good proximal hip strength and control, within 5lb HHD in LE to allow for proper functional mobility as indicated by patients Functional Deficits: ability to ascend/descend steps with reciprocal pattern without limitation. 4. Patient will return to ambulating >10,000 steps/day without increased symptoms or restriction. 5. Pt will ambulate on hills/inclines without symptoms or restriction for ability to participate in 18 holes of golf. Progression Towards Functional goals:  [x] Patient is progressing as expected towards functional goals listed. [] Progression is slowed due to complexities listed. [] Progression has been slowed due to co-morbidities.   [] Plan just implemented, too soon to assess goals progression  [] Other:     ASSESSMENT: Continuing to improve in flexibility. Continues to demonstrate restriction in ITB and HF. Eccentric HF strengthening effective. Assess for potential d/c NV.     Treatment/Activity Tolerance:  [x] Patient tolerated treatment well [] Patient limited by fatique  [] Patient limited by pain  [] Patient limited by other medical complications  [] Other:     Prognosis: [x] Good [] Fair  [] Poor    Patient Requires Follow-up: [x] Yes  [] No    PLAN:   [x] Continue per plan of care [x] Alter current plan (see comments): 1x/wk 4 wks for TDN  [] Plan of care initiated [] Hold pending MD visit [] Discharge    Electronically signed by: Deborah Tapia, DPT [id number]

## 2018-01-01 ENCOUNTER — HOSPITAL ENCOUNTER (OUTPATIENT)
Dept: PHYSICAL THERAPY | Age: 48
Discharge: HOME | End: 2018-01-31
Attending: ORTHOPAEDIC SURGERY | Admitting: ORTHOPAEDIC SURGERY

## 2018-01-09 ENCOUNTER — HOSPITAL ENCOUNTER (OUTPATIENT)
Dept: PHYSICAL THERAPY | Age: 48
Discharge: HOME | End: 2018-01-31
Admitting: ORTHOPAEDIC SURGERY

## 2018-01-09 ENCOUNTER — HOSPITAL ENCOUNTER (OUTPATIENT)
Dept: PHYSICAL THERAPY | Age: 48
Discharge: HOME | End: 2018-01-09
Attending: ORTHOPAEDIC SURGERY | Admitting: ORTHOPAEDIC SURGERY

## 2018-01-09 NOTE — FLOWSHEET NOTE
swelling/inflammation/restriction, improving soft tissue extensibility and allowing for proper ROM for normal function with self care, mobility, lifting and ambulation. Modalities:    Charges:  Timed Code Treatment Minutes: 60   Total Treatment Minutes: 60     [] EVAL (LOW) 11416 (typically 20 minutes face-to-face)  [] EVAL (MOD) 07189 (typically 30 minutes face-to-face)  [] EVAL (HIGH) 50288 (typically 45 minutes face-to-face)  [] RE-EVAL      [x] AO(43572) x  1   [] IONTO  [x] NMR (08757) x  1   [] VASO  [x] Manual (60493) x  1    [] Other:  [] TA x       [] Mech Traction (68964)  [x] ES(attended) (95087)      [] ES (un) (81038):     GOALS: Short Term Goals: To be achieved in: 2 weeks  1. Independent in HEP and progression per patient tolerance, in order to prevent re-injury. 2. Patient will have a decrease in pain to facilitate improvement in movement, function, and ADLs as indicated by Functional Deficits.     Long Term Goals: To be achieved in: 5-6 weeks  1. Disability index score of 10% or less for the LEFS to assist with reaching prior level of function. 2. Patient will demonstrate increased flexibility R hip flexors/quadriceps to allow for proper joint functioning as indicated by patients Functional Deficits. 3. Patient will demonstrate an increase in Strength to good proximal hip strength and control, within 5lb HHD in LE to allow for proper functional mobility as indicated by patients Functional Deficits: ability to ascend/descend steps with reciprocal pattern without limitation. 4. Patient will return to ambulating >10,000 steps/day without increased symptoms or restriction. 5. Pt will ambulate on hills/inclines without symptoms or restriction for ability to participate in 18 holes of golf. Progression Towards Functional goals:  [x] Patient is progressing as expected towards functional goals listed. [] Progression is slowed due to complexities listed.   [] Progression has been slowed

## 2018-01-09 NOTE — PLAN OF CARE
[facility],  [address]     Physical Therapy 10th Visit Progress Note    Date: 2018        Patient Name:  Chio Baker    :  1970  MRN: [id number]  Medical/Treatment Diagnosis Information:  · Diagnosis: M16.11 (ICD-10-CM) - Primary osteoarthritis of right hip, Z96.641 (ICD-10-CM) - History of total hip arthroplasty, right, M24.552 (ICD-10-CM) - Hip flexor tightness, left  · Treatment Diagnosis: R hip pain H39.236  Insurance/Certification information:  PT Insurance Information: MVP  Physician Information:  Referring Practitioner: Shea Eaton of care signed (Y/N):         Visit # Insurance Allowable   15 54         OBJECTIVE  Test used Initial score Current Score   Pain Summary VAS  0-1   Functional questionnaire LEFS  30%   Functional Testing Silver test  Slightly restricted, but more mobile than L   ROM flexion  120    ER  50    IR  25   Strength ABD  4    Ext/glut  4+/4+    HF  4+        Functional Limitation G-Code (if applicable):         PT G-Codes  Functional Assessment Tool Used: LEFS  Score: 30%  Functional Limitation: Mobility: Walking and moving around  Mobility: Walking and Moving Around Current Status (): At least 20 percent but less than 40 percent impaired, limited or restricted  Mobility: Walking and Moving Around Goal Status ():  At least 1 percent but less than 20 percent impaired, limited or restricted   Test/tests used to determine % limitation:  Actual Score used to drive % limitation:    Treatment to date:  [x] Therapeutic Exercise    [] Modalities:  [] Therapeutic Activity             []Ultrasound            [x]Electrical Stimulation  [] Gait Training     []Cervical Traction    [] Lumbar Traction  [x] Neuromuscular Re-education [x] Cold/hotpack         []Iontophoresis  [x] Instruction in HEP      Other:  [x] Manual Therapy                   [x] TDN

## 2018-02-01 ENCOUNTER — HOSPITAL ENCOUNTER (OUTPATIENT)
Dept: PHYSICAL THERAPY | Age: 48
Discharge: HOME | End: 2018-02-28
Attending: ORTHOPAEDIC SURGERY | Admitting: ORTHOPAEDIC SURGERY

## 2018-03-12 ENCOUNTER — HOSPITAL ENCOUNTER (OUTPATIENT)
Dept: PHYSICAL THERAPY | Age: 48
Discharge: HOME | End: 2018-03-31
Attending: ORTHOPAEDIC SURGERY | Admitting: ORTHOPAEDIC SURGERY

## 2018-04-01 ENCOUNTER — HOSPITAL ENCOUNTER (OUTPATIENT)
Dept: PHYSICAL THERAPY | Age: 48
Discharge: HOME | End: 2018-04-30
Attending: ORTHOPAEDIC SURGERY | Admitting: ORTHOPAEDIC SURGERY

## 2018-05-15 ENCOUNTER — HOSPITAL ENCOUNTER (OUTPATIENT)
Dept: PHYSICAL THERAPY | Age: 48
Discharge: HOME | End: 2018-05-31
Attending: ORTHOPAEDIC SURGERY | Admitting: ORTHOPAEDIC SURGERY

## 2018-06-01 ENCOUNTER — HOSPITAL ENCOUNTER (OUTPATIENT)
Dept: PHYSICAL THERAPY | Age: 48
Discharge: HOME | End: 2018-06-30
Attending: ORTHOPAEDIC SURGERY | Admitting: ORTHOPAEDIC SURGERY

## 2020-06-08 ENCOUNTER — HOSPITAL ENCOUNTER (OUTPATIENT)
Dept: PHYSICAL THERAPY | Age: 50
Setting detail: THERAPIES SERIES
Discharge: HOME | End: 2020-06-08
Payer: COMMERCIAL

## 2020-06-08 PROCEDURE — 97110 THERAPEUTIC EXERCISES: CPT | Performed by: PHYSICAL THERAPIST

## 2020-06-08 PROCEDURE — 97140 MANUAL THERAPY 1/> REGIONS: CPT | Performed by: PHYSICAL THERAPIST

## 2020-06-08 PROCEDURE — 97161 PT EVAL LOW COMPLEX 20 MIN: CPT | Performed by: PHYSICAL THERAPIST

## 2020-06-08 NOTE — PLAN OF CARE
(I20)  []Atherosclerosis (I70)   Musculoskeletal conditions   []Disc pathology   []Congenital spine pathologies   []Prior surgical intervention  []Osteoporosis (M81.8)  []Osteopenia (M85.8)   Endocrine conditions   []Hypothyroid (E03.9)  []Hyperthyroid Gastrointestinal conditions   []Constipation (R73.12)   Metabolic conditions   []Morbid obesity (E66.01)  []Diabetes type 1(E10.65) or 2 (E11.65)   []Neuropathy (G60.9)     Pulmonary conditions   []Asthma (J45)  []Coughing   []COPD (J44.9)   Psychological Disorders  []Anxiety (F41.9)  []Depression (F32.9)   []Other:   []Other:          Barriers to/and or personal factors that will affect rehab potential:              []Age  []Sex              []Motivation/Lack of Motivation                        []Co-Morbidities              []Cognitive Function, education/learning barriers              []Environmental, home barriers              []profession/work barriers  []past PT/medical experience  []other:  Justification:  Pt will do well    Falls Risk Assessment (30 days):   [x] Falls Risk assessed and no intervention required. [] Falls Risk assessed and Patient requires intervention due to being higher risk   TUG score (>12s at risk):     [] Falls education provided, including       G-Codes:       ASSESSMENT: Pt is a 45year old male with intermittent R medial foot pain, particularly with descending steps. He presents with increased pronation in mid and forefoot in WB and medial/lateral weakness. He also presents with lumbar mobility deficits above his fusion. He will benefit from PT to address biomechanical biomechanical deficits and return to PLOF.   Functional Impairments:     [x]Noted lumbar/proximal hip hypomobility   []Noted lumbosacral and/or generalized hypermobility   [x]Decreased Lumbosacral/hip/LE functional ROM   [x]Decreased core/proximal hip strength and neuromuscular control    [x]Decreased LE functional strength    []Abnormal RLE and lumbar spine      Prognosis/Rehab Potential:      []Excellent   [x]Good    []Fair   []Poor    Tolerance of evaluation/treatment:    []Excellent   [x]Good    []Fair   []Poor    Physical Therapy Evaluation Complexity Justification  [x] A history of present problem with:  [] no personal factors and/or comorbidities that impact the plan of care;  [x]1-2 personal factors and/or comorbidities that impact the plan of care  []3 personal factors and/or comorbidities that impact the plan of care  [x] An examination of body systems using standardized tests and measures addressing any of the following: body structures and functions (impairments), activity limitations, and/or participation restrictions;:  [x] a total of 1-2 or more elements   [] a total of 3 or more elements   [] a total of 4 or more elements   [x] A clinical presentation with:  [x] stable and/or uncomplicated characteristics   [] evolving clinical presentation with changing characteristics  [] unstable and unpredictable characteristics;   [x] Clinical decision making of [x] low, [] moderate, [] high complexity using standardized patient assessment instrument and/or measurable assessment of functional outcome. [x] EVAL (LOW) 90032 (typically 20 minutes face-to-face)  [] EVAL (MOD) 73039 (typically 30 minutes face-to-face)  [] EVAL (HIGH) 40147 (typically 45 minutes face-to-face)  [] RE-EVAL     PLAN: Begin PT focusing on: proximal hip mobilizations, LB mobs, LB core activation, proximal hip activation, and HEP    Frequency/Duration:  1 days per week for 6 Weeks:  Interventions:  [x]  Therapeutic exercise including: strength training, ROM, for LE, Glutes and core   [x]  NMR activation and proprioception for glutes , LE and Core   [x]  Manual therapy as indicated for Hip complex, LE and spine to include: Dry Needling/IASTM, STM, PROM, Gr I-IV mobilizations, manipulation.    [x]  Modalities as needed that may include: thermal agents, E-stim, Biofeedback, US, iontophoresis as indicated  [x]  Patient education on joint protection, postural re-education, activity modification, progression of HEP. HEP instruction: see flowsheet    GOALS:  Patient stated goal: decrease pain  [] Progressing: [] Met: [x] Not Met: [] Adjusted    Therapist goals for Patient:   Short Term Goals: To be achieved in: 2 weeks  1. Independent in HEP and progression per patient tolerance, in order to prevent re-injury. [] Progressing: [] Met: [] Not Met: [] Adjusted  2. Patient will have a decrease in pain to facilitate improvement in movement, function, and ADLs as indicated by Functional Deficits. [] Progressing: [] Met: [] Not Met: [] Adjusted      Long Term Goals: To be achieved in: 6 weeks  1. Disability index score of 0% or less for the Oswestry  to assist with reaching prior level of function. [] Progressing: [] Met: [x] Not Met: [] Adjusted  2. Patient will demonstrate increased AROM to good LS mobility, DF >/5 to allow for proper joint functioning as indicated by patients Functional Deficits. [] Progressing: [] Met: [x] Not Met: [] Adjusted  3. Patient will demonstrate an increase in Strength to good proximal hip and core activation to allow for proper functional mobility as indicated by patients Functional Deficits. [] Progressing: [] Met: [x] Not Met: [] Adjusted  4. Patient will return to descending stair functional activities without increased symptoms or restriction.    [] Progressing: [] Met: [x] Not Met: [] Adjusted       Electronically signed by:  Bret Mckeon, PT , DPT, CDNT

## 2020-06-08 NOTE — FLOWSHEET NOTE
lifting, and ambulation      Manual Treatments:  PROM / STM / Oscillations-Mobs:  G-I, II, III, IV (PA's, Inf., Post.)  [x] (05945) Provided manual therapy to mobilize proximal hip and LS spine soft tissue/joints for the purpose of modulating pain, promoting relaxation,  increasing ROM, reducing/eliminating soft tissue swelling/inflammation/restriction, improving soft tissue extensibility and allowing for proper ROM for normal function with self care, mobility, lifting and ambulation. Modalities:      [] GR/ESU 15 min    [] GR 15 min  [] ESU     [] CP    [] MHP    [] declined  Charges:  Timed Code Treatment Minutes: 25   Total Treatment Minutes: 55     [x] EVAL (LOW) 12973 (typically 20 minutes face-to-face)  [] EVAL (MOD) 25584 (typically 30 minutes face-to-face)  [] EVAL (HIGH) 88196 (typically 45 minutes face-to-face)  [] RE-EVAL     [x] RS(14303) x1   [] IONTO  [] NMR (06855) x     [] VASO  [x] Manual (31792) x1      [] Other:  [] TA x      [] Mech Traction (88293)  [] ES(attended) (71016)      [] ES (un) (13691):     Goals:   Long Term Goals: To be achieved in: 6 weeks  1. Disability index score of 0% or less for the Oswestry  to assist with reaching prior level of function. [] Progressing: [] Met: [x] Not Met: [] Adjusted  2. Patient will demonstrate increased AROM to good LS mobility, DF >/5 to allow for proper joint functioning as indicated by patients Functional Deficits. [] Progressing: [] Met: [x] Not Met: [] Adjusted  3. Patient will demonstrate an increase in Strength to good proximal hip and core activation to allow for proper functional mobility as indicated by patients Functional Deficits. [] Progressing: [] Met: [x] Not Met: [] Adjusted  4. Patient will return to descending stair functional activities without increased symptoms or restriction.    [] Progressing: [] Met: [x] Not Met: [] Adjusted          Overall Progression Towards Functional goals/ Treatment Progress Update:  [x] Patient is progressing as expected towards functional goals listed. [x] Progression is slowed due to complexities/Impairments listed. [x] Progression has been slowed due to co-morbidities. [] Plan just implemented, too soon to assess goals progression <30days   [] Goals require adjustment due to lack of progress  [] Patient is not progressing as expected and requires additional follow up with physician  [] Other     Prognosis for POC:     [x] Good          [] Fair             [] Poor        Patient requires continued skilled intervention:         [x] Yes             [] No     ASSESSMENT:      Treatment/Activity Tolerance:  [x] Patient able to complete treatment  [] Patient limited by fatigue  [] Patient limited by pain                     [] Patient limited by other medical complications  [] Other:          PLAN: See eval  [] Continue per plan of care [] Alter current plan (see comments)  [x] Plan of care initiated [] Hold pending MD visit [] Discharge    Electronically signed by: Bonnie Lobato, PT , DPT, CDNT       Note: If patient does not return for scheduled/ recommended follow up visits, this note will serve as a discharge from care along with most recent update on progress.

## 2020-06-22 ENCOUNTER — HOSPITAL ENCOUNTER (OUTPATIENT)
Dept: PHYSICAL THERAPY | Age: 50
Setting detail: THERAPIES SERIES
Discharge: HOME | End: 2020-06-22
Payer: COMMERCIAL

## 2020-06-22 PROCEDURE — 97140 MANUAL THERAPY 1/> REGIONS: CPT | Performed by: PHYSICAL THERAPIST

## 2020-06-22 PROCEDURE — 97110 THERAPEUTIC EXERCISES: CPT | Performed by: PHYSICAL THERAPIST

## 2020-06-22 PROCEDURE — 97112 NEUROMUSCULAR REEDUCATION: CPT | Performed by: PHYSICAL THERAPIST

## 2020-06-22 NOTE — FLOWSHEET NOTE
Ankle Inversion with Anchored Resistance - 10 reps - 2-3 sets - 2-3x daily - 7x weekly   Long Sitting Ankle Eversion with Resistance - 10 reps - 2-3 sets - 2-3x daily - 7x weekly   Gastroc Stretch on Wall - 10 reps - 2-3 sets - 2-3x daily - 7x weekly   Standing Heel Raise - 10 reps - 2-3 sets - 2-3x daily - 7x weekly     Therapeutic Ex sets/reps comments   Supine Hamstring (S)     Supine Lat Ham (S)     Q/L (S)     LTR     Supine Piriformis (S)     Supine Hip flexor (S)          prone     Gastroc/soleus stretch :30x3    TB inv/ever HEP Black   HR + inv 3x10    Step up heel off 2x10 4 in   SLS arch activation m-l step 2x15         Manual Intervention      Ankle distraction 3 min    Ankle DF mob 5 min    STW post tib 6 min    IASTM post tib sweeps 4 min                        NMR re-education      TA activation                           Therapeutic Exercise and NMR EXR  [] (48458) Provided verbal/tactile cueing for activities related to strengthening, flexibility, endurance, ROM  for improvements in proximal hip and core control with self care, mobility, lifting and ambulation.  [] (18516) Provided verbal/tactile cueing for activities related to improving balance, coordination, kinesthetic sense, posture, motor skill, proprioception  to assist with core control in self care, mobility, lifting, and ambulation.      Therapeutic Activities:    [] (80754 or 99751) Provided verbal/tactile cueing for activities related to improving balance, coordination, kinesthetic sense, posture, motor skill, proprioception and motor activation to allow for proper function  with self care and ADLs  [] (06758) Provided training and instruction to the patient for proper core and proximal hip recruitment and positioning with ambulation re-education     Home Exercise Program:    [x] (30271) Reviewed/Progressed HEP activities related to strengthening, flexibility, endurance, ROM of core, proximal hip and LE for functional self-care, mobility, descending stair functional activities without increased symptoms or restriction. [] Progressing: [] Met: [x] Not Met: [] Adjusted          Overall Progression Towards Functional goals/ Treatment Progress Update:  [x] Patient is progressing as expected towards functional goals listed. [x] Progression is slowed due to complexities/Impairments listed. [x] Progression has been slowed due to co-morbidities. [] Plan just implemented, too soon to assess goals progression <30days   [] Goals require adjustment due to lack of progress  [] Patient is not progressing as expected and requires additional follow up with physician  [] Other     Prognosis for POC:     [x] Good          [] Fair             [] Poor        Patient requires continued skilled intervention:         [x] Yes             [] No     ASSESSMENT:  No pain with treatment. Improvement noted post-STW. Continue to assess for lumbar component. Treatment/Activity Tolerance:  [x] Patient able to complete treatment  [] Patient limited by fatigue  [] Patient limited by pain                     [] Patient limited by other medical complications  [] Other:          PLAN: See eval  [] Continue per plan of care [] Alter current plan (see comments)  [x] Plan of care initiated [] Hold pending MD visit [] Discharge    Electronically signed by: Shaylee Guzmán, PT , DPT, CDNT       Note: If patient does not return for scheduled/ recommended follow up visits, this note will serve as a discharge from care along with most recent update on progress.

## 2020-06-26 ENCOUNTER — HOSPITAL ENCOUNTER (OUTPATIENT)
Dept: PHYSICAL THERAPY | Age: 50
Setting detail: THERAPIES SERIES
Discharge: HOME | End: 2020-06-26
Payer: COMMERCIAL

## 2020-06-26 PROCEDURE — 97140 MANUAL THERAPY 1/> REGIONS: CPT | Performed by: PHYSICAL THERAPIST

## 2020-06-26 PROCEDURE — 97110 THERAPEUTIC EXERCISES: CPT | Performed by: PHYSICAL THERAPIST

## 2020-06-26 NOTE — FLOWSHEET NOTE
(51355) Reviewed/Progressed HEP activities related to strengthening, flexibility, endurance, ROM of core, proximal hip and LE for functional self-care, mobility, lifting and ambulation   [] (19392) Reviewed/Progressed HEP activities related to improving balance, coordination, kinesthetic sense, posture, motor skill, proprioception of core, proximal hip and LE for self care, mobility, lifting, and ambulation      Manual Treatments:  PROM / STM / Oscillations-Mobs:  G-I, II, III, IV (PA's, Inf., Post.)  [x] (39432) Provided manual therapy to mobilize proximal hip and LS spine soft tissue/joints for the purpose of modulating pain, promoting relaxation,  increasing ROM, reducing/eliminating soft tissue swelling/inflammation/restriction, improving soft tissue extensibility and allowing for proper ROM for normal function with self care, mobility, lifting and ambulation. Modalities:      [] GR/ESU 15 min    [] GR 15 min  [] ESU     [] CP    [] MHP    [] declined  Charges:  Timed Code Treatment Minutes: 50   Total Treatment Minutes: 50     [x] EVAL (LOW) 30852 (typically 20 minutes face-to-face)  [] EVAL (MOD) 09444 (typically 30 minutes face-to-face)  [] EVAL (HIGH) 71630 (typically 45 minutes face-to-face)  [] RE-EVAL     [] AK(12761) x   [] IONTO  [x] NMR (50893) x  1   [] VASO  [x] Manual (89951) x2      [] Other:  [] TA x      [] Mech Traction (34305)  [] ES(attended) (67657)      [] ES (un) (98431):     Goals:   Long Term Goals: To be achieved in: 6 weeks  1. Disability index score of 0% or less for the Oswestry  to assist with reaching prior level of function. [] Progressing: [] Met: [x] Not Met: [] Adjusted  2. Patient will demonstrate increased AROM to good LS mobility, DF >/5 to allow for proper joint functioning as indicated by patients Functional Deficits. [] Progressing: [] Met: [x] Not Met: [] Adjusted  3.  Patient will demonstrate an increase in Strength to good proximal hip and core activation to

## 2020-06-29 ENCOUNTER — HOSPITAL ENCOUNTER (OUTPATIENT)
Dept: PHYSICAL THERAPY | Age: 50
Setting detail: THERAPIES SERIES
Discharge: HOME | End: 2020-06-29
Payer: COMMERCIAL

## 2020-06-29 PROCEDURE — 97112 NEUROMUSCULAR REEDUCATION: CPT | Performed by: PHYSICAL THERAPIST

## 2020-06-29 PROCEDURE — 97140 MANUAL THERAPY 1/> REGIONS: CPT | Performed by: PHYSICAL THERAPIST

## 2020-06-29 NOTE — FLOWSHEET NOTE
2-3x daily - 7x weekly   Long Sitting Ankle Eversion with Resistance - 10 reps - 2-3 sets - 2-3x daily - 7x weekly   Gastroc Stretch on Wall - 10 reps - 2-3 sets - 2-3x daily - 7x weekly   Standing Heel Raise - 10 reps - 2-3 sets - 2-3x daily - 7x weekly     Therapeutic Ex sets/reps comments   Supine Hamstring (S)     Supine Lat Ham (S)     Q/L (S)     LTR     Supine Piriformis (S)     Supine Hip flexor (S)          prone     Gastroc incline stretch/ :30x3 With towel support    TB inv/ever HEP Black   HR + inv seated x10 less pain reported    Step up heel off  4 in   SLS arch activation m-l step          Manual Intervention      Ankle distraction 5 min    Ankle DF mob     STW post tib/lateral gastroc 8 min    IASTM post tib sweeps/medial arch     Post glide distal fib head     Rearfoot mobs 5 min Gr 1   Tibial ER mobs 5 min         NMR re-education      TA activation     BAPS 4-way x20 each    Reformer HR , walking 2x10 each 2R   SLS LLE support :10x5 Arch training          Therapeutic Exercise and NMR EXR  [] (18147) Provided verbal/tactile cueing for activities related to strengthening, flexibility, endurance, ROM  for improvements in proximal hip and core control with self care, mobility, lifting and ambulation.  [] (32846) Provided verbal/tactile cueing for activities related to improving balance, coordination, kinesthetic sense, posture, motor skill, proprioception  to assist with core control in self care, mobility, lifting, and ambulation.      Therapeutic Activities:    [] (54867 or 03619) Provided verbal/tactile cueing for activities related to improving balance, coordination, kinesthetic sense, posture, motor skill, proprioception and motor activation to allow for proper function  with self care and ADLs  [] (20757) Provided training and instruction to the patient for proper core and proximal hip recruitment and positioning with ambulation re-education     Home Exercise Program:    [x] (80067) Reviewed/Progressed HEP activities related to strengthening, flexibility, endurance, ROM of core, proximal hip and LE for functional self-care, mobility, lifting and ambulation   [] (21965) Reviewed/Progressed HEP activities related to improving balance, coordination, kinesthetic sense, posture, motor skill, proprioception of core, proximal hip and LE for self care, mobility, lifting, and ambulation      Manual Treatments:  PROM / STM / Oscillations-Mobs:  G-I, II, III, IV (PA's, Inf., Post.)  [x] (66150) Provided manual therapy to mobilize proximal hip and LS spine soft tissue/joints for the purpose of modulating pain, promoting relaxation,  increasing ROM, reducing/eliminating soft tissue swelling/inflammation/restriction, improving soft tissue extensibility and allowing for proper ROM for normal function with self care, mobility, lifting and ambulation. Modalities:      [] GR/ESU 15 min    [] GR 15 min  [] ESU     [] CP    [] MHP    [x] LASER 5J/cm^2 x4 posterior tib  Charges:  Timed Code Treatment Minutes: 50   Total Treatment Minutes: 50     [x] EVAL (LOW) 20889 (typically 20 minutes face-to-face)  [] EVAL (MOD) 64753 (typically 30 minutes face-to-face)  [] EVAL (HIGH) 08602 (typically 45 minutes face-to-face)  [] RE-EVAL     [] HU(48874) x   [] IONTO  [x] NMR (44036) x  1   [] VASO  [x] Manual (99143) x2      [] Other:  [] TA x      [] Mech Traction (92982)  [] ES(attended) (86971)      [] ES (un) (45561):     Goals:   Long Term Goals: To be achieved in: 6 weeks  1. Disability index score of 0% or less for the Oswestry  to assist with reaching prior level of function. [] Progressing: [] Met: [x] Not Met: [] Adjusted  2. Patient will demonstrate increased AROM to good LS mobility, DF >/5 to allow for proper joint functioning as indicated by patients Functional Deficits. [] Progressing: [] Met: [x] Not Met: [] Adjusted  3.  Patient will demonstrate an increase in Strength to good proximal hip and core

## 2020-07-10 ENCOUNTER — HOSPITAL ENCOUNTER (OUTPATIENT)
Dept: PHYSICAL THERAPY | Age: 50
Setting detail: THERAPIES SERIES
End: 2020-07-10
Payer: COMMERCIAL

## 2020-07-10 ENCOUNTER — HOSPITAL ENCOUNTER (OUTPATIENT)
Dept: PHYSICAL THERAPY | Age: 50
Setting detail: THERAPIES SERIES
Discharge: HOME | End: 2020-07-10
Payer: COMMERCIAL

## 2020-07-10 PROCEDURE — 97110 THERAPEUTIC EXERCISES: CPT | Performed by: PHYSICAL THERAPIST

## 2020-07-10 PROCEDURE — 97112 NEUROMUSCULAR REEDUCATION: CPT | Performed by: PHYSICAL THERAPIST

## 2020-07-10 PROCEDURE — 97140 MANUAL THERAPY 1/> REGIONS: CPT | Performed by: PHYSICAL THERAPIST

## 2020-07-10 NOTE — PLAN OF CARE
[place] [address] [facility],  [facility]  [address], [address] [facility]  Phone: [phone]  Fax [phone]        [address] [facility], [address] [facility]  [address] [facility], [address] [facility]. [facility]  Phone: [phone]  Fax [phone]        To:   Ardyth Severs     Patient: Garrett Arizmendi   : 1970  LGB:[id number]  Evaluation Date: 7/10/2020      Diagnosis Information:    Diagnosis: LBP: lumbar spondylosis M47.816             ·                   Treatment Diagnosis Right foot pain M79.671, Lumbar pain M54.5       ·       Physical Therapy Certification/Re-Certification Form  Dear Dr. Akash Upton,  The following patient has been evaluated for physical therapy services and for therapy to continue, Medicare requires monthly physician review of the treatment plan. Please review the attached evaluation and/or summary of the patient's plan of care, and verify that you agree therapy should continue by signing the attached document and sending it back to our office. Plan of Care/Treatment to date:  [x] Therapeutic Exercise    [] Modalities:  [] Therapeutic Activity     [] Ultrasound  [] Electric Stimulation  [] Gait Training      [] Cervical Traction [] Lumbar Traction  [x] Neuromuscular Re-education    [] Cold/hotpack [] Iontophoresis   [x] Instruction in HEP     Other:  [x] Manual Therapy      []             [] Aquatic Therapy      []           ? Frequency/Duration:  # Days per week: [x] 1 day # Weeks: [] 1 week [] 7 weeks     [x] 2 days? [] 2 weeks [] 8 weeks     [] 3 days   [] 3 weeks [] 9 weeks     [] 4 days   [x] 4 weeks [] 10 weeks      [] 5 days   [] 5 weeks [] 11 weeks          [] 6 weeks [] 12 weeks      Rehab Potential: [] Excellent [] Good [] Fair  [] Poor       Electronically signed by:  Ruby Martinez, PT , DPT, CDNT        If you have any questions or concerns, please don't hesitate to call.   Thank you for your referral.      Physician Signature:________________________________Date:__________________  By signing above, therapists plan is approved by physician         Physical Therapy Treatment Note/ Progress Report:   Date:  7/10/2020    Patient Name:  Harinder Hearn    :  1970  MRN: [id number]  Physician Information:     Medical/Treatment Diagnosis Information:  Diagnosis: LBP: lumbar spondylosis M47.816                               Treatment Diagnosis: Right foot pain M79.671, Lumbar pain M54.5       [x] Conservative / [] Surgical - DOS:    Insurance/Certification information:     Number of Comorbidities:  []0     [x]1-2    []3+    Has the plan of care been signed (Y/N):        []  Yes                    [x]  No          Is this a Progress Report:        []  Yes                    [x]  No       If Yes:  Date Range for reporting period:  Beginnin2020  Endin/10/2020     Progress report will be due (10 Rx or 30 days ):         Recertification will be due (POC Duration  / 90 days ): 2020    Latex Allergy:  [x]NO      []YES  Preferred Language for Healthcare:   [x]English       []Other:      Visit # Insurance Allowable   5 ? SUBJECTIVE: Pt reports he had some anterior pain until yesterday. Now it feels good. OBJECTIVE:    Test used Initial score Current Score   Pain Summary VAS 1  1    [Functional questionnaire Oswestry  14%     Strength inv  4 4               ever  4  5               LEFT RIGHT current   Lumbar Flex Ankle joint       Lumbar Ext restricted       Side Bend B symmetrical       Rotation restricted WNL     DF   3  5                   Observation:  MTrp posterior tib and lateral gastroc,   Palpation:      RESTRICTIONS/PRECAUTIONS:  L5-S1 fusion; R THR     Exercises/Interventions:     Access Code: [email or web address]   URL: [email or web address]   Date: 2020   Prepared by: Marek Iyer     Exercises   Long Sitting Ankle Inversion with Anchored Resistance - 10 reps - 2-3 sets - 2-3x daily - 7x weekly   Long Sitting Ankle Eversion with Resistance - 10 reps - 2-3 sets - 2-3x daily - 7x weekly   Gastroc Stretch on Wall - 10 reps - 2-3 sets - 2-3x daily - 7x weekly   Standing Heel Raise - 10 reps - 2-3 sets - 2-3x daily - 7x weekly     Therapeutic Ex sets/reps comments   Supine Hamstring (S)     Supine Lat Ham (S)     Q/L (S)     LTR     Supine Piriformis (S)     Supine Hip flexor (S)     Towel scrunch + inv x20    prone     Gastroc incline stretch/soleus stretch :30x3 With towel support   vc for tibial alignment with soleus stretching    TB inv/ever HEP Black   HR + inv seated dndx10 less pain reported    Step up heel off  4 in   SLS arch activation m-l step          Manual Intervention      Ankle distraction 5 min    Ankle DF mob 4 min    STW post tib/lateral gastroc 8 min    IASTM ant tib, sweeps lateral gastroc 6 min    Post glide distal fib head     Rearfoot mobs  Gr 1   Tibial ER mobs dnd5 min         NMR re-education      TA activation     BAPS standing x40  PF-DF,  x10 circles R/L Towel for arch   Reformer HR , walking  2R   SLS LLE support  Arch training          Therapeutic Exercise and NMR EXR  [] (54276) Provided verbal/tactile cueing for activities related to strengthening, flexibility, endurance, ROM  for improvements in proximal hip and core control with self care, mobility, lifting and ambulation.  [] (22212) Provided verbal/tactile cueing for activities related to improving balance, coordination, kinesthetic sense, posture, motor skill, proprioception  to assist with core control in self care, mobility, lifting, and ambulation.      Therapeutic Activities:    [] (72273 or 24003) Provided verbal/tactile cueing for activities related to improving balance, coordination, kinesthetic sense, posture, motor skill, proprioception and motor activation to allow for proper function  with self care and ADLs  [] (90136) Provided training and instruction to the patient for proper core and proximal hip recruitment and positioning with ambulation re-education     Home Exercise Program:    [x] (48957) Reviewed/Progressed HEP activities related to strengthening, flexibility, endurance, ROM of core, proximal hip and LE for functional self-care, mobility, lifting and ambulation   [] (75113) Reviewed/Progressed HEP activities related to improving balance, coordination, kinesthetic sense, posture, motor skill, proprioception of core, proximal hip and LE for self care, mobility, lifting, and ambulation      Manual Treatments:  PROM / STM / Oscillations-Mobs:  G-I, II, III, IV (PA's, Inf., Post.)  [x] (34110) Provided manual therapy to mobilize proximal hip and LS spine soft tissue/joints for the purpose of modulating pain, promoting relaxation,  increasing ROM, reducing/eliminating soft tissue swelling/inflammation/restriction, improving soft tissue extensibility and allowing for proper ROM for normal function with self care, mobility, lifting and ambulation. Modalities:      [] GR/ESU 15 min    [] GR 15 min  [] ESU     [] CP    [] MHP    [x] LASER 5J/cm^2 x2 posterior tib , ant tib  Charges:  Timed Code Treatment Minutes: 45   Total Treatment Minutes: 45     [x] EVAL (LOW) 01111 (typically 20 minutes face-to-face)  [] EVAL (MOD) 65739 (typically 30 minutes face-to-face)  [] EVAL (HIGH) 26504 (typically 45 minutes face-to-face)  [] RE-EVAL     [x] PZ(62941) x 1  [] IONTO  [x] NMR (00876) x  1   [] VASO  [x] Manual (16018) x1      [] Other:  [] TA x      [] Mech Traction (82762)  [] ES(attended) (17540)      [] ES (un) (81715):     Goals:   Long Term Goals: To be achieved in: 6 weeks  1. Disability index score of 0% or less for the Oswestry  to assist with reaching prior level of function. [] Progressing: [] Met: [x] Not Met: [] Adjusted  2.  Patient will demonstrate increased AROM to good LS mobility, DF >/5 to allow for proper joint functioning as indicated by patients Functional Deficits. [x] Progressing: [] Met: [] Not Met: [] Adjusted  3. Patient will demonstrate an increase in Strength to good proximal hip and core activation to allow for proper functional mobility as indicated by patients Functional Deficits. [x] Progressing: [] Met: [x] Not Met: [] Adjusted  4. Patient will return to descending stair functional activities without increased symptoms or restriction. [x] Progressing: [] Met: [] Not Met: [] Adjusted          Overall Progression Towards Functional goals/ Treatment Progress Update:  [x] Patient is progressing as expected towards functional goals listed. [x] Progression is slowed due to complexities/Impairments listed. [] Progression has been slowed due to co-morbidities. [] Plan just implemented, too soon to assess goals progression <30days   [] Goals require adjustment due to lack of progress  [] Patient is not progressing as expected and requires additional follow up with physician  [] Other     Prognosis for POC:     [x] Good          [] Fair             [] Poor        Patient requires continued skilled intervention:         [x] Yes             [] No     ASSESSMENT:  Ankle mechanics and Soft tissue mobility improving. Persistent weakness in posterior tib. No reported low back pain, although lateral leg numbness at distal thigh persist.    Treatment/Activity Tolerance:  [x] Patient able to complete treatment  [] Patient limited by fatigue  [] Patient limited by pain                     [] Patient limited by other medical complications  [] Other:          PLAN: See eval  [] Continue per plan of care   cont 1-2x/wk 4 wks [] Alter current plan (see comments)  [x] Plan of care initiated [] Hold pending MD visit [] Discharge    Electronically signed by: Felton Alvarado, PT , DPT, CDNT       Note: If patient does not return for scheduled/ recommended follow up visits, this note will serve as a discharge from care along with most recent update on progress.

## 2020-07-13 ENCOUNTER — HOSPITAL ENCOUNTER (OUTPATIENT)
Dept: PHYSICAL THERAPY | Age: 50
Setting detail: THERAPIES SERIES
Discharge: HOME | End: 2020-07-13
Payer: COMMERCIAL

## 2020-07-13 PROCEDURE — 97110 THERAPEUTIC EXERCISES: CPT | Performed by: PHYSICAL THERAPIST

## 2020-07-13 PROCEDURE — 97112 NEUROMUSCULAR REEDUCATION: CPT | Performed by: PHYSICAL THERAPIST

## 2020-07-13 PROCEDURE — 97530 THERAPEUTIC ACTIVITIES: CPT | Performed by: PHYSICAL THERAPIST

## 2020-07-13 NOTE — PLAN OF CARE
[facility]  [address] [facility], [address] [facility] [address] [place]  Phone: [phone]  Fax [phone]        [address] [facility], [address] [facility]  Kiko Collincrys [address] [facility], [address] [facility] [place]  Phone: [phone]  Fax [phone]        To:   eZinab Fish     Patient: Ariana Gorman   : 1970  ONQ:[id number]  Evaluation Date: 2020      Diagnosis Information:    Diagnosis: LBP: lumbar spondylosis M47.816             ·                   Treatment Diagnosis Right foot pain M79.671, Lumbar pain M54.5       ·       Physical Therapy Certification/Re-Certification Form  Dear Dr. Peg Fulton,  The following patient has been evaluated for physical therapy services and for therapy to continue, Medicare requires monthly physician review of the treatment plan. Please review the attached evaluation and/or summary of the patient's plan of care, and verify that you agree therapy should continue by signing the attached document and sending it back to our office. Plan of Care/Treatment to date:  [x] Therapeutic Exercise    [] Modalities:  [] Therapeutic Activity     [] Ultrasound  [] Electric Stimulation  [] Gait Training      [] Cervical Traction [] Lumbar Traction  [x] Neuromuscular Re-education    [] Cold/hotpack [] Iontophoresis   [x] Instruction in HEP     Other:  [x] Manual Therapy      []             [] Aquatic Therapy      []           ? Frequency/Duration:  # Days per week: [x] 1 day # Weeks: [] 1 week [] 7 weeks     [x] 2 days? [] 2 weeks [] 8 weeks     [] 3 days   [] 3 weeks [] 9 weeks     [] 4 days   [x] 4 weeks [] 10 weeks      [] 5 days   [] 5 weeks [] 11 weeks          [] 6 weeks [] 12 weeks      Rehab Potential: [] Excellent [] Good [] Fair  [] Poor       Electronically signed by:  Shaun Vega, PT , DPT, CDNT        If you have any questions or concerns, please don't hesitate to call.   Thank you for your referral.      Physician Signature:________________________________Date:__________________  By signing above, therapists plan is approved by physician         Physical Therapy Treatment Note/ Progress Report:   Date:  2020    Patient Name:  Hussein Brito    :  1970  MRN: [id number]  Physician Information:     Medical/Treatment Diagnosis Information:  Diagnosis: LBP: lumbar spondylosis M47.816                               Treatment Diagnosis: Right foot pain M79.671, Lumbar pain M54.5       [x] Conservative / [] Surgical - DOS:    Insurance/Certification information:     Number of Comorbidities:  []0     [x]1-2    []3+    Has the plan of care been signed (Y/N):        []  Yes                    [x]  No          Is this a Progress Report:        []  Yes                    [x]  No       If Yes:  Date Range for reporting period:  Beginnin2020  Endin/10/2020     Progress report will be due (10 Rx or 30 days ): 7208        Recertification will be due (POC Duration  / 90 days ): 2020    Latex Allergy:  [x]NO      []YES  Preferred Language for Healthcare:   [x]English       []Other:      Visit # Insurance Allowable   6 ? SUBJECTIVE: Pt reports he is noticing improving, but still hurts with lowering down from a heel raise position. OBJECTIVE:    Test used Initial score Current Score   Pain Summary VAS 1  1    [Functional questionnaire Oswestry  14%     Strength inv  4 4               ever  4  5               LEFT RIGHT current   Lumbar Flex Ankle joint       Lumbar Ext restricted       Side Bend B symmetrical       Rotation restricted WNL     DF   3  5                   Observation:  MTrp posterior tib and lateral gastroc,   Palpation:      RESTRICTIONS/PRECAUTIONS:  L5-S1 fusion; R THR     Exercises/Interventions:     Access Code: [email or web address]   URL: [email or web address]   Date: 2020   Prepared by: Renetta Arms     Exercises   Long Sitting Ankle Inversion with Anchored Resistance - 10 reps - 2-3 sets - 2-3x daily - 7x weekly   Long Sitting Ankle Eversion with Resistance - 10 reps - 2-3 sets - 2-3x daily - 7x weekly   Gastroc Stretch on Wall - 10 reps - 2-3 sets - 2-3x daily - 7x weekly   Standing Heel Raise - 10 reps - 2-3 sets - 2-3x daily - 7x weekly     Therapeutic Ex sets/reps comments   Supine Hamstring (S)     Supine Lat Ham (S)     Q/L (S)     LTR     Supine Piriformis (S)     Supine Hip flexor (S)     Towel scrunch + inv x20    prone     Gastroc incline stretch/soleus stretch :30x3 With towel support   vc for tibial alignment with soleus stretching    TB inv/ever HEP Black   HR + inv seated dndx10 less pain reported    Step up heel off  4 in   SLS arch activation m-l step     Mini squat + UE to L knee to SLS NV Towel for arch   Manual Intervention      Ankle distraction 5 min    Ankle DF mob 4 min    STW post tib/lateral gastroc 8 min    IASTM ant tib, sweeps lateral gastroc 6 min    Post glide distal fib head     Rearfoot mobs  Gr 1   Tibial ER mobs dnd5 min    Lumbar PAs L2, L4 3 min each    NMR re-education      TA activation     BAPS standing x40  PF-DF,  2x10 circles R/L Towel for arch   Reformer HR , walking  2R   SLS LLE support  Arch training          Therapeutic Exercise and NMR EXR  [] (45162) Provided verbal/tactile cueing for activities related to strengthening, flexibility, endurance, ROM  for improvements in proximal hip and core control with self care, mobility, lifting and ambulation.  [] (76533) Provided verbal/tactile cueing for activities related to improving balance, coordination, kinesthetic sense, posture, motor skill, proprioception  to assist with core control in self care, mobility, lifting, and ambulation.      Therapeutic Activities:    [] (54994 or 09867) Provided verbal/tactile cueing for activities related to improving balance, coordination, kinesthetic sense, posture, motor skill, proprioception and motor activation to allow for proper function  with self care and ADLs  [] (09518) Provided training and instruction to the patient for proper core and proximal hip recruitment and positioning with ambulation re-education     Home Exercise Program:    [x] (20405) Reviewed/Progressed HEP activities related to strengthening, flexibility, endurance, ROM of core, proximal hip and LE for functional self-care, mobility, lifting and ambulation   [] (53254) Reviewed/Progressed HEP activities related to improving balance, coordination, kinesthetic sense, posture, motor skill, proprioception of core, proximal hip and LE for self care, mobility, lifting, and ambulation      Manual Treatments:  PROM / STM / Oscillations-Mobs:  G-I, II, III, IV (PA's, Inf., Post.)  [x] (91812) Provided manual therapy to mobilize proximal hip and LS spine soft tissue/joints for the purpose of modulating pain, promoting relaxation,  increasing ROM, reducing/eliminating soft tissue swelling/inflammation/restriction, improving soft tissue extensibility and allowing for proper ROM for normal function with self care, mobility, lifting and ambulation. Modalities:      [] GR/ESU 15 min    [] GR 15 min  [] ESU     [] CP    [] MHP    [x] LASER 5J/cm^2 x2 posterior tib , ant tib  Charges:  Timed Code Treatment Minutes: 45   Total Treatment Minutes: 45     [x] EVAL (LOW) 24746 (typically 20 minutes face-to-face)  [] EVAL (MOD) 52559 (typically 30 minutes face-to-face)  [] EVAL (HIGH) 19167 (typically 45 minutes face-to-face)  [] RE-EVAL     [x] IK(38709) x 1  [] IONTO  [x] NMR (48464) x  1   [] VASO  [x] Manual (55765) x1      [] Other:  [] TA x      [] Mech Traction (41196)  [] ES(attended) (16182)      [] ES (un) (53204):     Goals:   Long Term Goals: To be achieved in: 6 weeks  1. Disability index score of 0% or less for the Oswestry  to assist with reaching prior level of function. [] Progressing: [] Met: [x] Not Met: [] Adjusted  2.  Patient will demonstrate increased AROM to good LS mobility, DF >/5 to allow for proper joint functioning as indicated by patients Functional Deficits. [x] Progressing: [] Met: [] Not Met: [] Adjusted  3. Patient will demonstrate an increase in Strength to good proximal hip and core activation to allow for proper functional mobility as indicated by patients Functional Deficits. [x] Progressing: [] Met: [x] Not Met: [] Adjusted  4. Patient will return to descending stair functional activities without increased symptoms or restriction. [x] Progressing: [] Met: [] Not Met: [] Adjusted          Overall Progression Towards Functional goals/ Treatment Progress Update:  [x] Patient is progressing as expected towards functional goals listed. [x] Progression is slowed due to complexities/Impairments listed. [] Progression has been slowed due to co-morbidities. [] Plan just implemented, too soon to assess goals progression <30days   [] Goals require adjustment due to lack of progress  [] Patient is not progressing as expected and requires additional follow up with physician  [] Other     Prognosis for POC:     [x] Good          [] Fair             [] Poor        Patient requires continued skilled intervention:         [x] Yes             [] No     ASSESSMENT:  Tolerated lumbar PAs well- no radicular symptoms, although stiffness at L2, L4. Treatment/Activity Tolerance:  [x] Patient able to complete treatment  [] Patient limited by fatigue  [] Patient limited by pain                     [] Patient limited by other medical complications  [] Other:          PLAN: See eval  [] Continue per plan of care   cont 1-2x/wk 4 wks [] Alter current plan (see comments)  [x] Plan of care initiated [] Hold pending MD visit [] Discharge    Electronically signed by: Emely Sandoval, PT , DPT, CDNT       Note: If patient does not return for scheduled/ recommended follow up visits, this note will serve as a discharge from care along with most recent update on progress.

## 2020-07-17 ENCOUNTER — HOSPITAL ENCOUNTER (OUTPATIENT)
Dept: PHYSICAL THERAPY | Age: 50
Setting detail: THERAPIES SERIES
Discharge: HOME | End: 2020-07-17
Payer: COMMERCIAL

## 2020-07-17 PROCEDURE — 97140 MANUAL THERAPY 1/> REGIONS: CPT | Performed by: PHYSICAL THERAPIST

## 2020-07-17 PROCEDURE — 97110 THERAPEUTIC EXERCISES: CPT | Performed by: PHYSICAL THERAPIST

## 2020-07-17 NOTE — PLAN OF CARE
[facility],  [address]  Phone: [phone]  Fax [phone]       Physical Therapy Treatment Note/ Progress Report:   Date:  2020    Patient Name:  Jimmy Jennings    :  1970  MRN: [id number]  Physician Information:     Medical/Treatment Diagnosis Information:  Diagnosis: LBP: lumbar spondylosis M47.816                               Treatment Diagnosis: Right foot pain M79.671, Lumbar pain M54.5       [x] Conservative / [] Surgical - DOS:    Insurance/Certification information:     Number of Comorbidities:  []0     [x]1-2    []3+    Has the plan of care been signed (Y/N):        []  Yes                    [x]  No          Is this a Progress Report:        []  Yes                    [x]  No       If Yes:  Date Range for reporting period:  Beginnin2020  Endin/10/2020     Progress report will be due (10 Rx or 30 days ): 3/84/8272        Recertification will be due (POC Duration  / 90 days ): 2020    Latex Allergy:  [x]NO      []YES  Preferred Language for Healthcare:   [x]English       []Other:      Visit # Insurance Allowable   7 ? SUBJECTIVE: Pt reports has hasn't had pain in 3 days. He will be out of town for work. OBJECTIVE:    Test used Initial score Current Score   Pain Summary VAS 1  1    [Functional questionnaire Oswestry  14%     Strength inv  4 4               ever  4  5               LEFT RIGHT current   Lumbar Flex Ankle joint       Lumbar Ext restricted       Side Bend B symmetrical       Rotation restricted WNL     DF   3  5                   Observation:  MTrp posterior tib and lateral gastroc,   Palpation:      RESTRICTIONS/PRECAUTIONS:  L5-S1 fusion; R THR     Exercises/Interventions:     Access Code: [email or web address]   URL: [email or web address]   Date: 2020   Prepared by: Luther Sommer     Exercises   Long Sitting Ankle Inversion with Anchored Resistance - 10 reps - 2-3 sets - 2-3x daily - 7x weekly   Long Sitting Ankle Eversion with Resistance - 10 reps - 2-3 sets - 2-3x daily - 7x weekly   Gastroc Stretch on Wall - 10 reps - 2-3 sets - 2-3x daily - 7x weekly   Standing Heel Raise - 10 reps - 2-3 sets - 2-3x daily - 7x weekly     Therapeutic Ex sets/reps comments   Supine Hamstring (S)     Supine Lat Ham (S)     Q/L (S)     LTR     Supine Piriformis (S)     Supine Hip flexor (S)     Towel scrunch + inv x20    prone     Gastroc incline stretch/soleus stretch :30x3 With towel support   vc for tibial alignment with soleus stretching    TB inv/ever HEP Black   HR + inv seated dndx10 less pain reported    Step up heel off  4 in   SLS arch activation m-l step     Mini squat + UE to L knee to SLS NV Towel for arch   Manual Intervention      Ankle distraction     Ankle DF mob     STW post tib/lateral gastroc     IASTM ant tib, sweeps lateral gastroc 15 min    Post glide distal fib head     Rearfoot mobs  Gr 1   Tibial ER mobs dnd5 min    Lumbar PAs L2, L4 3 min each    NMR re-education      TA activation     BAPS standing x40  PF-DF, knee flexed x20   Towel for arch   Reformer HR , walking  2R   SLS LLE support  Arch training          Therapeutic Exercise and NMR EXR  [] (72363) Provided verbal/tactile cueing for activities related to strengthening, flexibility, endurance, ROM  for improvements in proximal hip and core control with self care, mobility, lifting and ambulation.  [] (16069) Provided verbal/tactile cueing for activities related to improving balance, coordination, kinesthetic sense, posture, motor skill, proprioception  to assist with core control in self care, mobility, lifting, and ambulation.      Therapeutic Activities:    [] (56166 or 59538) Provided verbal/tactile cueing for activities related to improving balance, coordination, kinesthetic sense, posture, motor skill, proprioception and motor activation to allow for proper function  with self care and ADLs  [] (95846) Provided training and instruction to the patient for proper core and proximal hip recruitment and positioning with ambulation re-education     Home Exercise Program:    [x] (62804) Reviewed/Progressed HEP activities related to strengthening, flexibility, endurance, ROM of core, proximal hip and LE for functional self-care, mobility, lifting and ambulation   [] (32609) Reviewed/Progressed HEP activities related to improving balance, coordination, kinesthetic sense, posture, motor skill, proprioception of core, proximal hip and LE for self care, mobility, lifting, and ambulation      Manual Treatments:  PROM / STM / Oscillations-Mobs:  G-I, II, III, IV (PA's, Inf., Post.)  [x] (62329) Provided manual therapy to mobilize proximal hip and LS spine soft tissue/joints for the purpose of modulating pain, promoting relaxation,  increasing ROM, reducing/eliminating soft tissue swelling/inflammation/restriction, improving soft tissue extensibility and allowing for proper ROM for normal function with self care, mobility, lifting and ambulation. Modalities:      [] GR/ESU 15 min    [] GR 15 min  [] ESU     [] CP    [] MHP    [] LASER 5J/cm^2 x2 posterior tib , ant tib  Charges:  Timed Code Treatment Minutes: 30   Total Treatment Minutes: 30     [x] EVAL (LOW) 52430 (typically 20 minutes face-to-face)  [] EVAL (MOD) 19363 (typically 30 minutes face-to-face)  [] EVAL (HIGH) 00240 (typically 45 minutes face-to-face)  [] RE-EVAL     [x] TF(57778) x 1  [] IONTO  [] NMR (89649) x     [] VASO  [x] Manual (67330) x1      [] Other:  [] TA x      [] Mech Traction (60141)  [] ES(attended) (22080)      [] ES (un) (08944):     Goals:   Long Term Goals: To be achieved in: 6 weeks  1. Disability index score of 0% or less for the Oswestry  to assist with reaching prior level of function. [] Progressing: [] Met: [x] Not Met: [] Adjusted  2.  Patient will demonstrate increased AROM to good LS mobility, DF >/5 to allow for proper joint functioning as indicated by patients Functional Deficits. [x] Progressing: [] Met: [] Not Met: [] Adjusted  3. Patient will demonstrate an increase in Strength to good proximal hip and core activation to allow for proper functional mobility as indicated by patients Functional Deficits. [x] Progressing: [] Met: [x] Not Met: [] Adjusted  4. Patient will return to descending stair functional activities without increased symptoms or restriction. [x] Progressing: [] Met: [] Not Met: [] Adjusted          Overall Progression Towards Functional goals/ Treatment Progress Update:  [x] Patient is progressing as expected towards functional goals listed. [x] Progression is slowed due to complexities/Impairments listed. [] Progression has been slowed due to co-morbidities. [] Plan just implemented, too soon to assess goals progression <30days   [] Goals require adjustment due to lack of progress  [] Patient is not progressing as expected and requires additional follow up with physician  [] Other     Prognosis for POC:     [x] Good          [] Fair             [] Poor        Patient requires continued skilled intervention:         [x] Yes             [] No     ASSESSMENT: Responding well to treatment. Reassess upon follow up. Treatment/Activity Tolerance:  [x] Patient able to complete treatment  [] Patient limited by fatigue  [] Patient limited by pain                     [] Patient limited by other medical complications  [] Other:          PLAN: See eval  [] Continue per plan of care   cont 1-2x/wk 4 wks [] Alter current plan (see comments)  [x] Plan of care initiated [] Hold pending MD visit [] Discharge    Electronically signed by: Wali Fischer, PT , DPT, CDNT       Note: If patient does not return for scheduled/ recommended follow up visits, this note will serve as a discharge from care along with most recent update on progress.

## 2020-07-21 ENCOUNTER — HOSPITAL ENCOUNTER (OUTPATIENT)
Dept: PHYSICAL THERAPY | Age: 50
Setting detail: THERAPIES SERIES
Discharge: HOME | End: 2020-07-21
Payer: COMMERCIAL

## 2020-07-21 PROCEDURE — 97140 MANUAL THERAPY 1/> REGIONS: CPT | Performed by: PHYSICAL THERAPIST

## 2020-07-21 PROCEDURE — 97112 NEUROMUSCULAR REEDUCATION: CPT | Performed by: PHYSICAL THERAPIST

## 2020-07-21 PROCEDURE — 97110 THERAPEUTIC EXERCISES: CPT | Performed by: PHYSICAL THERAPIST

## 2020-07-21 NOTE — FLOWSHEET NOTE
1998 L5-S1 fusion; R THR     Exercises/Interventions:     Access Code: [email or web address]   URL: [email or web address]   Date: 06/08/2020   Prepared by: Feng Gearing     Exercises   Long Sitting Ankle Inversion with Anchored Resistance - 10 reps - 2-3 sets - 2-3x daily - 7x weekly   Long Sitting Ankle Eversion with Resistance - 10 reps - 2-3 sets - 2-3x daily - 7x weekly   Gastroc Stretch on Wall - 10 reps - 2-3 sets - 2-3x daily - 7x weekly   Standing Heel Raise - 10 reps - 2-3 sets - 2-3x daily - 7x weekly     Therapeutic Ex sets/reps comments   Supine Hamstring (S)     Supine Lat Ham (S)     Q/L (S)     LTR     Supine Piriformis (S)     Supine Hip flexor (S)     Towel scrunch + inv x20    Heel walk 10 ft x2    Gastroc incline stretch/soleus stretch :30x3   In shoe    gastroc incline stretch contract relax ant tib :30x3    TB DF/DF +ever, DF +inv x5 each Visible control deficits demonstrated   HR + inv seated     Step up heel off  4 in   SLS arch activation m-l step     Mini squat + UE to L knee to SLS NV Towel for arch   Manual Intervention      Ankle distraction     Ankle DF mob 7 min    STW post tib/lateral gastroc     IASTM ant tib, sweeps lateral gastroc     Post glide distal fib head x1    Rearfoot mobs  Gr 1   Tibial ER mobs 5 min    Lumbar PAs L2, L4     NMR re-education      TA activation     BAPS standing x40  PF-DF, knee flexed x20   Towel for arch   Reformer HR , walking  2R   SLS LLE support  Arch training          Therapeutic Exercise and NMR EXR  [] (71613) Provided verbal/tactile cueing for activities related to strengthening, flexibility, endurance, ROM  for improvements in proximal hip and core control with self care, mobility, lifting and ambulation.  [] (38967) Provided verbal/tactile cueing for activities related to improving balance, coordination, kinesthetic sense, posture, motor skill, proprioception  to assist with core control in self care, mobility, lifting, and ambulation. be achieved in: 6 weeks  1. Disability index score of 0% or less for the Oswestry  to assist with reaching prior level of function. [] Progressing: [] Met: [x] Not Met: [] Adjusted  2. Patient will demonstrate increased AROM to good LS mobility, DF >/5 to allow for proper joint functioning as indicated by patients Functional Deficits. [x] Progressing: [] Met: [] Not Met: [] Adjusted  3. Patient will demonstrate an increase in Strength to good proximal hip and core activation to allow for proper functional mobility as indicated by patients Functional Deficits. [x] Progressing: [] Met: [x] Not Met: [] Adjusted  4. Patient will return to descending stair functional activities without increased symptoms or restriction. [x] Progressing: [] Met: [] Not Met: [] Adjusted          Overall Progression Towards Functional goals/ Treatment Progress Update:  [x] Patient is progressing as expected towards functional goals listed. [x] Progression is slowed due to complexities/Impairments listed. [] Progression has been slowed due to co-morbidities. [] Plan just implemented, too soon to assess goals progression <30days   [] Goals require adjustment due to lack of progress  [] Patient is not progressing as expected and requires additional follow up with physician  [] Other     Prognosis for POC:     [x] Good          [] Fair             [] Poor        Patient requires continued skilled intervention:         [x] Yes             [] No     ASSESSMENT: Imbalance of anterior tib vs gastroc strength demonstrated with ecc phase of heel raise and increased pain and active insufficiency with anterior tib activation with ecc HR.      Treatment/Activity Tolerance:  [x] Patient able to complete treatment  [] Patient limited by fatigue  [] Patient limited by pain                     [] Patient limited by other medical complications  [] Other:          PLAN: See eval  [x] Continue per plan of care   cont 1-2x/wk 4 wks [] Alter current plan (see comments)  [] Plan of care initiated [] Hold pending MD visit [] Discharge    Electronically signed by: Camacho Fernández, PT , DPT, CDNT       Note: If patient does not return for scheduled/ recommended follow up visits, this note will serve as a discharge from care along with most recent update on progress.

## 2020-07-28 ENCOUNTER — HOSPITAL ENCOUNTER (OUTPATIENT)
Dept: PHYSICAL THERAPY | Age: 50
Setting detail: THERAPIES SERIES
Discharge: HOME | End: 2020-07-28
Payer: COMMERCIAL

## 2020-07-28 PROCEDURE — 97140 MANUAL THERAPY 1/> REGIONS: CPT | Performed by: PHYSICAL THERAPIST

## 2020-07-28 PROCEDURE — 97112 NEUROMUSCULAR REEDUCATION: CPT | Performed by: PHYSICAL THERAPIST

## 2020-07-28 PROCEDURE — 97110 THERAPEUTIC EXERCISES: CPT | Performed by: PHYSICAL THERAPIST

## 2020-07-28 NOTE — FLOWSHEET NOTE
[facility],  [address]  Phone: [phone]  Fax [phone]       Physical Therapy Treatment Note/ Progress Report:   Date:  2020    Patient Name:  Harper Roman    :  1970  MRN: [id number]  Physician Information:     Medical/Treatment Diagnosis Information:  Diagnosis: LBP: lumbar spondylosis M47.816                               Treatment Diagnosis: Right foot pain M79.671, Lumbar pain M54.5       [x] Conservative / [] Surgical - DOS:    Insurance/Certification information:     Number of Comorbidities:  []0     [x]1-2    []3+    Has the plan of care been signed (Y/N):        []  Yes                    [x]  No          Is this a Progress Report:        []  Yes                    [x]  No       If Yes:  Date Range for reporting period:  Beginnin2020  Endin/10/2020     Progress report will be due (10 Rx or 30 days ): 9638        Recertification will be due (POC Duration  / 90 days ): 2020    Latex Allergy:  [x]NO      []YES  Preferred Language for Healthcare:   [x]English       []Other:      Visit # Insurance Allowable   9 ? SUBJECTIVE: Pt reports he is going up stairs better. He is still cautious, but he is having less pain. He notices when he stretches his foot in PF that he feels a burning on the inside of his heel. OBJECTIVE:    Test used Initial score Current Score   Pain Summary VAS 1  1    [Functional questionnaire Oswestry  14%     Strength inv  4 4               ever  4  5               LEFT RIGHT current   Lumbar Flex Ankle joint       Lumbar Ext restricted       Side Bend B symmetrical       Rotation restricted WNL     DF   3  5                   Observation:  MTrp posterior tib and lateral gastroc,   Palpation:      RESTRICTIONS/PRECAUTIONS:  L5-S1 fusion; R THR     Exercises/Interventions:     Access Code: [email or web address]   URL: [email or web address] Date: 06/08/2020   Prepared by: Dominga Crane     Exercises   Long Sitting Ankle Inversion with Anchored Resistance - 10 reps - 2-3 sets - 2-3x daily - 7x weekly   Long Sitting Ankle Eversion with Resistance - 10 reps - 2-3 sets - 2-3x daily - 7x weekly   Gastroc Stretch on Wall - 10 reps - 2-3 sets - 2-3x daily - 7x weekly   Standing Heel Raise - 10 reps - 2-3 sets - 2-3x daily - 7x weekly     Therapeutic Ex sets/reps comments   Supine Hamstring (S)     Supine Lat Ham (S)     Q/L (S)     LTR     Supine Piriformis (S)     Supine Hip flexor (S)     Towel scrunch + inv x20    Heel walk 10 ft x2    Gastroc incline stretch/soleus stretch :30x3   In shoe    gastroc incline stretch contract relax ant tib :30x3    TB DF/DF +ever, DF +inv MRE DF, D1/D2 DF x10 each Visible control deficits demonstrated   HR + inv seated     Step up heel off  4 in        Mini squat + UE to L knee to SLS NV Towel for arch   Manual Intervention      Ankle distraction     Ankle DF mob     STW post tib/lateral gastroc     IASTM ant tib/distal achilles footprint med/lat 10 min    Post glide distal fib head     Rearfoot mobs  Gr 1   Tibial ER mobs     Lumbar PAs L2, L4     NMR re-education      TA activation     BAPS standing x40  PF-DF, knee flexed x20   In shoe  2# PL     Reformer HR , walking  2R   SLS LLE support  Arch training          Therapeutic Exercise and NMR EXR  [] (02966) Provided verbal/tactile cueing for activities related to strengthening, flexibility, endurance, ROM  for improvements in proximal hip and core control with self care, mobility, lifting and ambulation.  [] (97469) Provided verbal/tactile cueing for activities related to improving balance, coordination, kinesthetic sense, posture, motor skill, proprioception  to assist with core control in self care, mobility, lifting, and ambulation.      Therapeutic Activities:    [] (53082 or 15801) Provided verbal/tactile cueing for activities related to improving balance, coordination, kinesthetic sense, posture, motor skill, proprioception and motor activation to allow for proper function  with self care and ADLs  [] (49045) Provided training and instruction to the patient for proper core and proximal hip recruitment and positioning with ambulation re-education     Home Exercise Program:    [x] (88346) Reviewed/Progressed HEP activities related to strengthening, flexibility, endurance, ROM of core, proximal hip and LE for functional self-care, mobility, lifting and ambulation   [] (11999) Reviewed/Progressed HEP activities related to improving balance, coordination, kinesthetic sense, posture, motor skill, proprioception of core, proximal hip and LE for self care, mobility, lifting, and ambulation      Manual Treatments:  PROM / STM / Oscillations-Mobs:  G-I, II, III, IV (PA's, Inf., Post.)  [x] (65283) Provided manual therapy to mobilize proximal hip and LS spine soft tissue/joints for the purpose of modulating pain, promoting relaxation,  increasing ROM, reducing/eliminating soft tissue swelling/inflammation/restriction, improving soft tissue extensibility and allowing for proper ROM for normal function with self care, mobility, lifting and ambulation. Modalities:      [] GR/ESU 15 min    [] GR 15 min  [] ESU     [] CP    [] MHP    [] LASER 5J/cm^2 x2 posterior tib , ant tib  Charges:  Timed Code Treatment Minutes: 41   Total Treatment Minutes: 41     [x] EVAL (LOW) 06159 (typically 20 minutes face-to-face)  [] EVAL (MOD) 07512 (typically 30 minutes face-to-face)  [] EVAL (HIGH) 84754 (typically 45 minutes face-to-face)  [] RE-EVAL     [x] HORACE(29746) x 1  [] IONTO  [x] NMR (58552) x  1   [] VASO  [x] Manual (89223) x1      [] Other:  [] TA x      [] Mech Traction (75273)  [] ES(attended) (31127)      [] ES (un) (23565):     Goals:   Long Term Goals: To be achieved in: 6 weeks  1.  Disability index score of 0% or less for the Oswestry  to assist with reaching prior level of function. [] Progressing: [] Met: [x] Not Met: [] Adjusted  2. Patient will demonstrate increased AROM to good LS mobility, DF >/5 to allow for proper joint functioning as indicated by patients Functional Deficits. [x] Progressing: [] Met: [] Not Met: [] Adjusted  3. Patient will demonstrate an increase in Strength to good proximal hip and core activation to allow for proper functional mobility as indicated by patients Functional Deficits. [x] Progressing: [] Met: [x] Not Met: [] Adjusted  4. Patient will return to descending stair functional activities without increased symptoms or restriction. [x] Progressing: [] Met: [] Not Met: [] Adjusted          Overall Progression Towards Functional goals/ Treatment Progress Update:  [x] Patient is progressing as expected towards functional goals listed. [x] Progression is slowed due to complexities/Impairments listed. [] Progression has been slowed due to co-morbidities. [] Plan just implemented, too soon to assess goals progression <30days   [] Goals require adjustment due to lack of progress  [] Patient is not progressing as expected and requires additional follow up with physician  [] Other     Prognosis for POC:     [x] Good          [] Fair             [] Poor        Patient requires continued skilled intervention:         [x] Yes             [] No     ASSESSMENT: Responding well to ant tib/post focus. Cont STW and strengthening to balance and ant and post compartment.     Treatment/Activity Tolerance:  [x] Patient able to complete treatment  [] Patient limited by fatigue  [] Patient limited by pain                     [] Patient limited by other medical complications  [] Other:          PLAN: See eval  [x] Continue per plan of care   cont 1-2x/wk 4 wks [] Alter current plan (see comments)  [] Plan of care initiated [] Hold pending MD visit [] Discharge    Electronically signed by: Cindy Carvajal, PT , DPT, CDNT       Note: If patient does not return for scheduled/ recommended follow up visits, this note will serve as a discharge from care along with most recent update on progress.

## 2020-08-04 ENCOUNTER — HOSPITAL ENCOUNTER (OUTPATIENT)
Dept: PHYSICAL THERAPY | Age: 50
Setting detail: THERAPIES SERIES
End: 2020-08-04
Payer: COMMERCIAL

## 2020-08-05 ENCOUNTER — HOSPITAL ENCOUNTER (OUTPATIENT)
Dept: PHYSICAL THERAPY | Age: 50
Setting detail: THERAPIES SERIES
Discharge: HOME | End: 2020-08-05
Payer: COMMERCIAL

## 2020-08-05 PROCEDURE — 97112 NEUROMUSCULAR REEDUCATION: CPT | Performed by: PHYSICAL THERAPIST

## 2020-08-05 PROCEDURE — 97140 MANUAL THERAPY 1/> REGIONS: CPT | Performed by: PHYSICAL THERAPIST

## 2020-08-05 PROCEDURE — 97110 THERAPEUTIC EXERCISES: CPT | Performed by: PHYSICAL THERAPIST

## 2020-08-05 NOTE — FLOWSHEET NOTE
[facility],  [address]  Phone: [phone]  Fax [phone]       Physical Therapy Treatment Note/ Progress Report:   Date:  2020    Patient Name:  Dwain Marsh    :  1970  MRN: [id number]  Physician Information:     Medical/Treatment Diagnosis Information:  Diagnosis: LBP: lumbar spondylosis M47.816                               Treatment Diagnosis: Right foot pain M79.671, Lumbar pain M54.5       [x] Conservative / [] Surgical - DOS:    Insurance/Certification information:     Number of Comorbidities:  []0     [x]1-2    []3+    Has the plan of care been signed (Y/N):        []  Yes                    [x]  No          Is this a Progress Report:        []  Yes                    [x]  No       If Yes:  Date Range for reporting period:  Beginnin2020  Endin/10/2020     Progress report will be due (10 Rx or 30 days ): 3/41/7107        Recertification will be due (POC Duration  / 90 days ): 2020    Latex Allergy:  [x]NO      []YES  Preferred Language for Healthcare:   [x]English       []Other:      Visit # Insurance Allowable   10 ? SUBJECTIVE: Pt reports he has a stiff neck. He is walking better and didn't have pain with golf. He says stairs are good sometimes and sometimes not. OBJECTIVE:    Test used Initial score Current Score   Pain Summary VAS 1  1    [Functional questionnaire Oswestry  14%     Strength inv  4 4               ever  4  5               LEFT RIGHT current   Lumbar Flex Ankle joint       Lumbar Ext restricted       Side Bend B symmetrical       Rotation restricted WNL     DF   3  5                   Observation:  MTrp posterior tib and lateral gastroc,   Palpation:      RESTRICTIONS/PRECAUTIONS:  L5-S1 fusion; R THR     Exercises/Interventions:     Access Code: [email or web address]   URL: [email or web address]   Date: 2020   Prepared by: Sylvia Soto kinesthetic sense, posture, motor skill, proprioception and motor activation to allow for proper function  with self care and ADLs  [] (31546) Provided training and instruction to the patient for proper core and proximal hip recruitment and positioning with ambulation re-education     Home Exercise Program:    [x] (14684) Reviewed/Progressed HEP activities related to strengthening, flexibility, endurance, ROM of core, proximal hip and LE for functional self-care, mobility, lifting and ambulation   [] (35955) Reviewed/Progressed HEP activities related to improving balance, coordination, kinesthetic sense, posture, motor skill, proprioception of core, proximal hip and LE for self care, mobility, lifting, and ambulation      Manual Treatments:  PROM / STM / Oscillations-Mobs:  G-I, II, III, IV (PA's, Inf., Post.)  [x] (24737) Provided manual therapy to mobilize proximal hip and LS spine soft tissue/joints for the purpose of modulating pain, promoting relaxation,  increasing ROM, reducing/eliminating soft tissue swelling/inflammation/restriction, improving soft tissue extensibility and allowing for proper ROM for normal function with self care, mobility, lifting and ambulation. Modalities:      [] GR/ESU 15 min    [] GR 15 min  [] ESU     [] CP    [] MHP    [] LASER 5J/cm^2 x2 posterior tib , ant tib  Charges:  Timed Code Treatment Minutes: 41   Total Treatment Minutes: 41     [x] EVAL (LOW) 15055 (typically 20 minutes face-to-face)  [] EVAL (MOD) 16411 (typically 30 minutes face-to-face)  [] EVAL (HIGH) 76826 (typically 45 minutes face-to-face)  [] RE-EVAL     [x] SC(27592) x 1  [] IONTO  [x] NMR (80978) x  1   [] VASO  [x] Manual (77307) x1      [] Other:  [] TA x      [] Mech Traction (85448)  [] ES(attended) (73746)      [] ES (un) (21914):     Goals:   Long Term Goals: To be achieved in: 6 weeks  1. Disability index score of 0% or less for the Oswestry  to assist with reaching prior level of function.    [] Progressing: [] Met: [x] Not Met: [] Adjusted  2. Patient will demonstrate increased AROM to good LS mobility, DF >/5 to allow for proper joint functioning as indicated by patients Functional Deficits. [x] Progressing: [] Met: [] Not Met: [] Adjusted  3. Patient will demonstrate an increase in Strength to good proximal hip and core activation to allow for proper functional mobility as indicated by patients Functional Deficits. [x] Progressing: [] Met: [x] Not Met: [] Adjusted  4. Patient will return to descending stair functional activities without increased symptoms or restriction. [x] Progressing: [] Met: [] Not Met: [] Adjusted          Overall Progression Towards Functional goals/ Treatment Progress Update:  [x] Patient is progressing as expected towards functional goals listed. [x] Progression is slowed due to complexities/Impairments listed. [] Progression has been slowed due to co-morbidities. [] Plan just implemented, too soon to assess goals progression <30days   [] Goals require adjustment due to lack of progress  [] Patient is not progressing as expected and requires additional follow up with physician  [] Other     Prognosis for POC:     [x] Good          [] Fair             [] Poor        Patient requires continued skilled intervention:         [x] Yes             [] No     ASSESSMENT: Responding well to ant tib/post focus. Most significant restriction medial gastroc and post tib distally.     Treatment/Activity Tolerance:  [x] Patient able to complete treatment  [] Patient limited by fatigue  [] Patient limited by pain                     [] Patient limited by other medical complications  [] Other:          PLAN: See eval  [x] Continue per plan of care   cont 1-2x/wk 4 wks [] Alter current plan (see comments)  [] Plan of care initiated [] Hold pending MD visit [] Discharge    Electronically signed by: Luis Carlos Vincent, PT , DPT, CDNT       Note: If patient does not return for scheduled/ recommended follow up visits, this note will serve as a discharge from care along with most recent update on progress.

## 2020-08-07 ENCOUNTER — HOSPITAL ENCOUNTER (OUTPATIENT)
Dept: PHYSICAL THERAPY | Age: 50
Setting detail: THERAPIES SERIES
Discharge: HOME | End: 2020-08-07
Payer: COMMERCIAL

## 2020-08-07 PROCEDURE — 97140 MANUAL THERAPY 1/> REGIONS: CPT | Performed by: PHYSICAL THERAPIST

## 2020-08-07 PROCEDURE — 97112 NEUROMUSCULAR REEDUCATION: CPT | Performed by: PHYSICAL THERAPIST

## 2020-08-07 PROCEDURE — 97110 THERAPEUTIC EXERCISES: CPT | Performed by: PHYSICAL THERAPIST

## 2020-08-07 NOTE — FLOWSHEET NOTE
[facility],  [address]  Phone: [phone]  Fax [phone]       Physical Therapy Treatment Note/ Progress Report:   Date:  2020    Patient Name:  Tara Cabral    :  1970  MRN: [id number]  Physician Information:     Medical/Treatment Diagnosis Information:  Diagnosis: LBP: lumbar spondylosis M47.816                               Treatment Diagnosis: Right foot pain M79.671, Lumbar pain M54.5       [x] Conservative / [] Surgical - DOS:    Insurance/Certification information:     Number of Comorbidities:  []0     [x]1-2    []3+    Has the plan of care been signed (Y/N):        []  Yes                    [x]  No          Is this a Progress Report:        []  Yes                    [x]  No       If Yes:  Date Range for reporting period:  Beginnin2020  Endin/10/2020     Progress report will be due (10 Rx or 30 days ): 2/15/6272        Recertification will be due (POC Duration  / 90 days ): 2020    Latex Allergy:  [x]NO      []YES  Preferred Language for Healthcare:   [x]English       []Other:      Visit # Insurance Allowable   11 ? SUBJECTIVE: Pt reports his neck and arm are still a problem/  His ankle has felt the best it has. OBJECTIVE:    Test used Initial score Current Score   Pain Summary VAS 1  1    [Functional questionnaire Oswestry  14%     Strength inv  4 4               ever  4  5               LEFT RIGHT current   Lumbar Flex Ankle joint       Lumbar Ext restricted       Side Bend B symmetrical       Rotation restricted WNL     DF   3  5                   Observation:  MTrp posterior tib and lateral gastroc,   Palpation:      RESTRICTIONS/PRECAUTIONS:  L5-S1 fusion; R THR     Exercises/Interventions:     Access Code: [email or web address]   URL: [email or web address]   Date: 2020   Prepared by: Feng Jorge     Exercises   Long Sitting Ankle Inversion with Anchored Resistance - 10 reps - 2-3 sets - 2-3x daily - 7x weekly   Long Sitting Ankle Eversion with Resistance - 10 reps - 2-3 sets - 2-3x daily - 7x weekly   Gastroc Stretch on Wall - 10 reps - 2-3 sets - 2-3x daily - 7x weekly   Standing Heel Raise - 10 reps - 2-3 sets - 2-3x daily - 7x weekly     Therapeutic Ex sets/reps comments   Supine Hamstring (S)     Supine Lat Ham (S)     Q/L (S)     LTR     Supine Piriformis (S)     Supine Hip flexor (S)     Towel scrunch + inv    Heel walk    Gastroc incline stretch/soleus stretch :30x3   In shoe    gastroc incline stretch contract relax ant tib :30x3    TB DF/DF +ever, DF +inv  Visible control deficits demonstrated   HR + inv seated     Step up foot off 3x10 2 in  Tc for hip position   Elevated squat on wedges 2x15 10# DB        Manual Intervention      Ankle distraction     Ankle DF mob     MFD lat gastroc prox/dist, medial gastrox prox, post tib distal  Post tib glide 7 min          IASTM ant tib/distal achilles footprint med/lat 10 min    Post glide distal fib head     Rearfoot mobs  Gr 1   Tibial ER mobs     Lumbar PAs L2, L4     NMR re-education      TA activation     BAPS standing x15 PF-DF, circles R/L  x15 each In shoe  2# PL     Reformer HR , walking  2R   SLS LLE support  Arch training          Therapeutic Exercise and NMR EXR  [] (29494) Provided verbal/tactile cueing for activities related to strengthening, flexibility, endurance, ROM  for improvements in proximal hip and core control with self care, mobility, lifting and ambulation.  [] (64207) Provided verbal/tactile cueing for activities related to improving balance, coordination, kinesthetic sense, posture, motor skill, proprioception  to assist with core control in self care, mobility, lifting, and ambulation.      Therapeutic Activities:    [] (83953 or 04020) Provided verbal/tactile cueing for activities related to improving balance, coordination, kinesthetic sense, posture, motor skill, proprioception and motor activation to allow for proper function  with self care and ADLs  [] (12476) Provided training and instruction to the patient for proper core and proximal hip recruitment and positioning with ambulation re-education     Home Exercise Program:    [x] (09414) Reviewed/Progressed HEP activities related to strengthening, flexibility, endurance, ROM of core, proximal hip and LE for functional self-care, mobility, lifting and ambulation   [] (96083) Reviewed/Progressed HEP activities related to improving balance, coordination, kinesthetic sense, posture, motor skill, proprioception of core, proximal hip and LE for self care, mobility, lifting, and ambulation      Manual Treatments:  PROM / STM / Oscillations-Mobs:  G-I, II, III, IV (PA's, Inf., Post.)  [x] (93439) Provided manual therapy to mobilize proximal hip and LS spine soft tissue/joints for the purpose of modulating pain, promoting relaxation,  increasing ROM, reducing/eliminating soft tissue swelling/inflammation/restriction, improving soft tissue extensibility and allowing for proper ROM for normal function with self care, mobility, lifting and ambulation. Modalities:      [] GR/ESU 15 min    [] GR 15 min  [] ESU     [] CP    [] MHP    [] LASER 5J/cm^2 x2 posterior tib , ant tib  Charges:  Timed Code Treatment Minutes: 41   Total Treatment Minutes: 41     [x] EVAL (LOW) 68963 (typically 20 minutes face-to-face)  [] EVAL (MOD) 67575 (typically 30 minutes face-to-face)  [] EVAL (HIGH) 81392 (typically 45 minutes face-to-face)  [] RE-EVAL     [x] JY(72445) x 1  [] IONTO  [x] NMR (92013) x  1   [] VASO  [x] Manual (31584) x1      [] Other:  [] TA x      [] Mech Traction (34237)  [] ES(attended) (24614)      [] ES (un) (28661):     Goals:   Long Term Goals: To be achieved in: 6 weeks  1. Disability index score of 0% or less for the Oswestry  to assist with reaching prior level of function.    [] Progressing: [] Met: [x] Not Met: [] Adjusted  2. Patient will demonstrate increased AROM to good LS mobility, DF >/5 to allow for proper joint functioning as indicated by patients Functional Deficits. [x] Progressing: [] Met: [] Not Met: [] Adjusted  3. Patient will demonstrate an increase in Strength to good proximal hip and core activation to allow for proper functional mobility as indicated by patients Functional Deficits. [x] Progressing: [] Met: [x] Not Met: [] Adjusted  4. Patient will return to descending stair functional activities without increased symptoms or restriction. [x] Progressing: [] Met: [] Not Met: [] Adjusted          Overall Progression Towards Functional goals/ Treatment Progress Update:  [x] Patient is progressing as expected towards functional goals listed. [x] Progression is slowed due to complexities/Impairments listed. [] Progression has been slowed due to co-morbidities. [] Plan just implemented, too soon to assess goals progression <30days   [] Goals require adjustment due to lack of progress  [] Patient is not progressing as expected and requires additional follow up with physician  [] Other     Prognosis for POC:     [x] Good          [] Fair             [] Poor        Patient requires continued skilled intervention:         [x] Yes             [] No     ASSESSMENT: Responding well to ant tib/post focus. Progress strength as tolerated.     Treatment/Activity Tolerance:  [x] Patient able to complete treatment  [] Patient limited by fatigue  [] Patient limited by pain                     [] Patient limited by other medical complications  [] Other:          PLAN: See eval  [x] Continue per plan of care   cont 1-2x/wk 4 wks [] Alter current plan (see comments)  [] Plan of care initiated [] Hold pending MD visit [] Discharge    Electronically signed by: Feng Jorge, PT , DPT, CDNT       Note: If patient does not return for scheduled/ recommended follow up visits, this note will serve as a discharge from care along with most recent update on progress.

## 2020-08-14 ENCOUNTER — HOSPITAL ENCOUNTER (OUTPATIENT)
Dept: PHYSICAL THERAPY | Age: 50
Setting detail: THERAPIES SERIES
Discharge: HOME | End: 2020-08-14
Payer: COMMERCIAL

## 2020-08-14 PROCEDURE — 97112 NEUROMUSCULAR REEDUCATION: CPT | Performed by: PHYSICAL THERAPIST

## 2020-08-14 PROCEDURE — 97110 THERAPEUTIC EXERCISES: CPT | Performed by: PHYSICAL THERAPIST

## 2020-08-14 PROCEDURE — 97140 MANUAL THERAPY 1/> REGIONS: CPT | Performed by: PHYSICAL THERAPIST

## 2020-08-14 NOTE — FLOWSHEET NOTE
Sitting Ankle Inversion with Anchored Resistance - 10 reps - 2-3 sets - 2-3x daily - 7x weekly   Long Sitting Ankle Eversion with Resistance - 10 reps - 2-3 sets - 2-3x daily - 7x weekly   Gastroc Stretch on Wall - 10 reps - 2-3 sets - 2-3x daily - 7x weekly   Standing Heel Raise - 10 reps - 2-3 sets - 2-3x daily - 7x weekly     Therapeutic Ex sets/reps comments   Supine Hamstring (S)     Supine Lat Ham (S)     Q/L (S)     LTR     Supine Piriformis (S)     Supine Hip flexor (S)     Towel scrunch + inv    Heel walk    Gastroc incline stretch/soleus stretch :30x3   In shoe    gastroc incline stretch contract relax ant tib :30x3    TB DF/DF +ever, DF +inv  Visible control deficits demonstrated   HR + inv seated     Step up foot off 3x10 2 in  Tc for hip position   Elevated squat on wedges 2x15 10# DB        Manual Intervention      Ankle distraction     Lat gastroc STW \"stick\"/IASTM prox 3 min/3 min    MFD lat gastroc prox/dist, medial gastrox prox, post tib distal  Post tib glide           IASTM ant tib/di 10 min    Post glide distal fib head     Rearfoot mobs  Gr 1   Tibial ER mobs     Lumbar PAs L2, L4     NMR re-education      TA activation     BAPS standing x15 PF-DF, circles R/L  x15 each, x10  In shoe  2# PL/ 2# AL     Reformer HR , walking  2R   SLS LLE support  Arch training          Therapeutic Exercise and NMR EXR  [] (24699) Provided verbal/tactile cueing for activities related to strengthening, flexibility, endurance, ROM  for improvements in proximal hip and core control with self care, mobility, lifting and ambulation.  [] (03719) Provided verbal/tactile cueing for activities related to improving balance, coordination, kinesthetic sense, posture, motor skill, proprioception  to assist with core control in self care, mobility, lifting, and ambulation.      Therapeutic Activities:    [] (34275 or 67148) Provided verbal/tactile cueing for activities related to improving balance, coordination, kinesthetic Met: [x] Not Met: [] Adjusted  2. Patient will demonstrate increased AROM to good LS mobility, DF >/5 to allow for proper joint functioning as indicated by patients Functional Deficits. [x] Progressing: [] Met: [] Not Met: [] Adjusted  3. Patient will demonstrate an increase in Strength to good proximal hip and core activation to allow for proper functional mobility as indicated by patients Functional Deficits. [x] Progressing: [] Met: [] Not Met: [] Adjusted  4. Patient will return to descending stair functional activities without increased symptoms or restriction. [x] Progressing: [] Met: [] Not Met: [] Adjusted          Overall Progression Towards Functional goals/ Treatment Progress Update:  [x] Patient is progressing as expected towards functional goals listed. [x] Progression is slowed due to complexities/Impairments listed. [] Progression has been slowed due to co-morbidities. [] Plan just implemented, too soon to assess goals progression <30days   [] Goals require adjustment due to lack of progress  [] Patient is not progressing as expected and requires additional follow up with physician  [] Other     Prognosis for POC:     [x] Good          [] Fair             [] Poor        Patient requires continued skilled intervention:         [x] Yes             [] No     ASSESSMENT: Still limited on descending steps with TT support first.  Able to perform with full foot on steps without pain.       Treatment/Activity Tolerance:  [x] Patient able to complete treatment  [] Patient limited by fatigue  [] Patient limited by pain                     [] Patient limited by other medical complications  [] Other:          PLAN: See eval  [x] Continue per plan of care   cont 1x/wk 4 wks [] Alter current plan (see comments)  [] Plan of care initiated [] Hold pending MD visit [] Discharge    Electronically signed by: Jessica Petty, PT , DPT, CDNT       Note: If patient does not return for scheduled/ recommended follow up visits, this note will serve as a discharge from care along with most recent update on progress.

## 2020-08-17 ENCOUNTER — HOSPITAL ENCOUNTER (OUTPATIENT)
Dept: PHYSICAL THERAPY | Age: 50
Setting detail: THERAPIES SERIES
Discharge: HOME | End: 2020-08-17
Payer: COMMERCIAL

## 2020-08-17 PROCEDURE — 97140 MANUAL THERAPY 1/> REGIONS: CPT | Performed by: PHYSICAL THERAPIST

## 2020-08-17 PROCEDURE — G0283 ELEC STIM OTHER THAN WOUND: HCPCS | Performed by: PHYSICAL THERAPIST

## 2020-08-17 PROCEDURE — 97110 THERAPEUTIC EXERCISES: CPT | Performed by: PHYSICAL THERAPIST

## 2020-08-17 PROCEDURE — 97161 PT EVAL LOW COMPLEX 20 MIN: CPT | Performed by: PHYSICAL THERAPIST

## 2020-08-17 NOTE — FLOWSHEET NOTE
Supine chin tuck 1 pillow and 2 towels x15    SBS supine x20                             Pt ed centralization; ergonomic set up; limiting aggravating factors; unloading; sleeping strategies 10 min                                                           Manual Intervention     sideglides to L for opening 6 min C5-C7  G2   Manual traction 4 min                             NMR re-education                                                 Therapeutic Exercise and NMR EXR  [x] (32985) Provided verbal/tactile cueing for activities related to strengthening, flexibility, endurance, ROM  for improvements in scapular, scapulothoracic and UE control with self care, reaching, carrying, lifting, house/yardwork, driving/computer work.    [] (84299) Provided verbal/tactile cueing for activities related to improving balance, coordination, kinesthetic sense, posture, motor skill, proprioception  to assist with  scapular, scapulothoracic and UE control with self care, reaching, carrying, lifting, house/yardwork, driving/computer work. Therapeutic Activities:    [] (47670 or 36050) Provided verbal/tactile cueing for activities related to improving balance, coordination, kinesthetic sense, posture, motor skill, proprioception and motor activation to allow for proper function of scapular, scapulothoracic and UE control with self care, carrying, lifting, driving/computer work.      Home Exercise Program:    [x] (92460) Reviewed/Progressed HEP activities related to strengthening, flexibility, endurance, ROM of scapular, scapulothoracic and UE control with self care, reaching, carrying, lifting, house/yardwork, driving/computer work  [] (42546) Reviewed/Progressed HEP activities related to improving balance, coordination, kinesthetic sense, posture, motor skill, proprioception of scapular, scapulothoracic and UE control with self care, reaching, carrying, lifting, house/yardwork, driving/computer work      Manual Treatments:  PROM / STM / Oscillations-Mobs:  G-I, II, III, IV (PA's, Inf., Post.)  [] (10667) Provided manual therapy to mobilize soft tissue/joints of cervical/CT, scapular GHJ and UE for the purpose of modulating pain, promoting relaxation,  increasing ROM, reducing/eliminating soft tissue swelling/inflammation/restriction, improving soft tissue extensibility and allowing for proper ROM for normal function with self care, reaching, carrying, lifting, house/yardwork, driving/computer work    Modalities:    [] GR/ESU 15 min    [] GR 15 min  [x] ESU     [x] CP    [] MHP    [] declined    Charges:  Timed Code Treatment Minutes: 25   Total Treatment Minutes: 60     [x] EVAL (LOW) 88554 (typically 20 minutes face-to-face)  [] EVAL (MOD) 74975 (typically 30 minutes face-to-face)  [] EVAL (HIGH) 07075 (typically 45 minutes face-to-face)  [] RE-EVAL     [x] UQ(34104) x   1  [] IONTO  [] NMR (22538) x     [] VASO  [x] Manual (87267) x  1    [] Other:  [] TA x      [] Mech Traction (57644)  [] ES(attended) (60960)      [x] ES (un) (72899):     GOALS:  Long Term Goals: To be achieved in: 6 weeks  1. Disability index score of 5% or less for the QDash  to assist with reaching prior level of function. [] Progressing: [] Met: [x] Not Met: [] Adjusted  2. Patient will demonstrate increased AROM to WNL to allow for proper joint functioning as indicated by patients Functional Deficits. [] Progressing: [] Met: [x] Not Met: [] Adjusted  3. Patient will demonstrate an increase in Strength >/=4+/5 to allow for proper functional mobility as indicated by patients Functional Deficits. [] Progressing: [] Met: [x] Not Met: [] Adjusted  4. Patient will return to sitting/work functional activities without increased symptoms or restriction. [] Progressing: [] Met: [x] Not Met: [] Adjusted  5. Pt will sleep through the night.    [] Progressing: [] Met: [x] Not Met: [] Adjusted    Overall Progression Towards Functional goals/ Treatment Progress Update:  [] Patient is progressing as expected towards functional goals listed. [] Progression is slowed due to complexities/Impairments listed. [] Progression has been slowed due to co-morbidities. [x] Plan just implemented, too soon to assess goals progression <30days   [] Goals require adjustment due to lack of progress  [] Patient is not progressing as expected and requires additional follow up with physician  [] Other    Prognosis for POC: [x] Good [] Fair  [] Poor      Patient requires continued skilled intervention: [x] Yes  [] No      ASSESSMENT:  See eval    Treatment/Activity Tolerance:  [x] Patient tolerated treatment well [] Patient limited by fatique  [] Patient limited by pain  [] Patient limited by other medical complications  [] Other:     Prognosis: [] Good [] Fair  [] Poor    Patient Requires Follow-up: [x] Yes  [] No    PLAN: See eval  [] Continue per plan of care [] Alter current plan (see comments above)  [x] Plan of care initiated [] Hold pending MD visit [] Discharge      Electronically signed by:  Kecia Christianson, PT , DPT, CDNT    Note: If patient does not return for scheduled/ recommended follow up visits, this note will serve as a discharge from care along with most recent update on progress.

## 2020-08-17 NOTE — PLAN OF CARE
[facility], [address] [place] [address]  Phone: [phone]  Fax [phone]     Physical Therapy Certification    Dear Referring Practitioner: Finn Ellis MD, Padma Perkins,    We had the pleasure of evaluating the following patient for physical therapy services at [address] [place] [address]. A summary of our findings can be found in the initial assessment below. This includes our plan of care. If you have any questions or concerns regarding these findings, please do not hesitate to contact me at the office phone number checked above. Thank you for the referral.       Physician Signature:_______________________________Date:__________________  By signing above (or electronic signature), therapists plan is approved by physician      Patient: Destiny Enamorado   : 1970   MRN: [id number]  Referring Physician: Referring Practitioner: Finn Ellis MD Babita      Evaluation Date: 2020      Medical Diagnosis Information:  Diagnosis: M79.602 upper limb pain     Treatment Diagnosis: Left shoulder pain M25.512                                          Insurance information:      Precautions/ Contra-indications:   Latex Allergy:  [x]NO      []YES  Preferred Language for Healthcare:   [x]English       []other:    SUBJECTIVE: Patient stated complaint: Pt reports he has had ~7-10 days of LUE pain and numbness that he noticed after golfing. He reports he has had pathology in his left shoulder over the years. He has had an EMG that showed neuritis in his elbow. He reports pain in his upper trap and scapula and on the side of his elbow. He has numbness down his arm to his 4th and 5th fingers. He reports doing his bench pressing on the foam roller is harder to do on his left arm.   Denies HA, dizziness, etc.     Relevant Medical History: R shoulder surgery    Easing factors: lying with pillow support; arm overhead ; sleeping L side over R side  Provocative factors: lying supine without a pillow   Height Weight  Type: []Constant   [x]Intermittent  []Radiating []Localized []other:     Numbness/Tingling: down arm to 4th/5th digit    Occupation/School:     Living Status/Prior Level of Function: Independent with ADLs and IADLs,      OBJECTIVE:      Initial Initial Current   VAS 3     QDash 27%     Strength Left Right    Shoulder Flex      Shoulder Abd      Shoulder ER      Shoulder IR      ROM Left Right    Shoulder Flex [facility]    Shoulder  p     Shoulder ER T3     Shoulder IR L4     THORACIC ROM      flexion      ext   R/L   Rotation R/L Restricted and p  R/L   SB R/L        CERV ROM Left Right    Cervical Flexion 30 UT p L     Cervical Extension 40 UT p L     Cervical SB R/L 10 p L 35 R/L   Cervical rotation R/L ~50 p ~ 65 p R/L     Reflexes/Sensation:    [x]Dermatomes/Myotomes intact    [x]Reflexes equal and normal bilaterally   []Other:    Joint mobility: decreased TTP L C5-T1 sideglides to R; ext PAs C5-T1;    []Normal    [x]Hypo   []Hyper    Palpation: TTP C5-C6 tp; first rib not elevated    Functional Mobility/Transfers: decreased symptoms with LUE overhead    Posture: flat back, forward shoulders    Bandages/Dressings/Incisions: NA    Gait: (include devices/WB status) WNL    Orthopedic Special Tests:   Miriam's    Nuviakin's Alejandro    Sulcus Sign    Load and Shift    Anterior Apprehension/Relocation    Dynamic Labral Shear    Empty Can    compression -   spurlings + R/L   distraction +                            [x] Patient history, allergies, meds reviewed. Medical chart reviewed. See intake form. Review Of Systems (ROS):  [x]Performed Review of systems (Integumentary, CardioPulmonary, Neurological) by intake and observation. Intake form has been scanned into medical record. Patient has been instructed to contact their primary care physician regarding ROS issues if not already being addressed at this time. reflexes/sensation/myotomal/dermatomal deficits   [x]Decreased RC/scapular/core strength and neuromuscular control   [x]other:      Functional Activity Limitations (from functional questionnaire and intake)   [x]Reduced ability to tolerate prolonged functional positions   [x]Reduced ability or difficulty with changes of positions or transfers between positions   [x]Reduced ability to maintain good posture and demonstrate good body mechanics with sitting, bending, and lifting   [x] Reduced ability or tolerance with driving and/or computer work   [x]Reduced ability to sleep   [x]Reduced ability to perform lifting, reaching, carrying tasks   []Reduced ability to tolerate impact through UE   []Reduced ability to reach behind back   []Reduced ability to  or hold objects   []Reduced ability to throw or toss an object   []other:    Participation Restrictions   [x]Reduced participation in self care activities   [x]Reduced participation in home management activities   [x]Reduced participation in work activities   []Reduced participation in social activities. [x]Reduced participation in sport/recreation activities. Classification :   []signs/symptoms consistent with post-surgical status including decreased ROM, strength and function.   []Signs/symptoms consistent with joint sprain/strain   []Signs/symptoms consistent with shoulder impingement   []Signs/symptoms consistent with shoulder/elbow tendinopathy   []Signs/symptoms consistent with Rotator cuff tear   []Signs/symptoms consistent with labral tear   []Signs/symptoms consistent with postural dysfunction    []signs/symptoms consistent with Glenohumeral Internal Rotation Deficit - <45 degrees   []signs/symptoms consistent with facet dysfunction of cervical/cervico-thoracic or thoracicspine    []signs/symptoms consistent with pathology which may benefit from Dry needling     [x]other: facet irritation, postural deficits  Prognosis/Rehab Potential: []Excellent   [x]Good    []Fair   []Poor    Tolerance of evaluation/treatment:    []Excellent   [x]Good    []Fair   []Poor    Physical Therapy Evaluation Complexity Justification  [x] A history of present problem with:  [] no personal factors and/or comorbidities that impact the plan of care;  [x]1-2 personal factors and/or comorbidities that impact the plan of care  []3 personal factors and/or comorbidities that impact the plan of care  [x] An examination of body systems using standardized tests and measures addressing any of the following: body structures and functions (impairments), activity limitations, and/or participation restrictions;:  [x] a total of 1-2 or more elements   [] a total of 3 or more elements   [] a total of 4 or more elements   [x] A clinical presentation with:  [x] stable and/or uncomplicated characteristics   [] evolving clinical presentation with changing characteristics  [] unstable and unpredictable characteristics;   [x] Clinical decision making of [x] low, [] moderate, [] high complexity using standardized patient assessment instrument and/or measurable assessment of functional outcome. [x] EVAL (LOW) 66296 (typically 20 minutes face-to-face)  [] EVAL (MOD) 59931 (typically 30 minutes face-to-face)  [] EVAL (HIGH) 53115 (typically 45 minutes face-to-face)  [] RE-EVAL     PLAN:  Frequency/Duration:  1-2 days per week for 6 Weeks:  INTERVENTIONS:  [x] Therapeutic exercise including: strength training, ROM, for Upper extremity and core   [x]  NMR activation and proprioception for UE, scap and Core   [x] Manual therapy as indicated for shoulder, scapula and spine to include: Dry Needling/IASTM, STM, PROM, Gr I-IV mobilizations, manipulation. [x] Modalities as needed that may include: thermal agents, E-stim, Biofeedback, US, iontophoresis as indicated  [x] Patient education on joint protection, postural re-education, activity modification, progression of HEP.     HEP instruction: Discussed HEP and patient given handout. GOALS:  Patient stated goal: decrease pain  [] Progressing: [] Met: [x] Not Met: [] Adjusted    Therapist goals for Patient:   Short Term Goals: To be achieved in: 2 weeks  1. Independent in HEP and progression per patient tolerance, in order to prevent re-injury. [] Progressing: [] Met: [x] Not Met: [] Adjusted  2. Patient will have a decrease in pain to facilitate improvement in movement, function, and ADLs as indicated by Functional Deficits. [] Progressing: [] Met: [x] Not Met: [] Adjusted      Long Term Goals: To be achieved in: 6 weeks  1. Disability index score of 5% or less for the QDash  to assist with reaching prior level of function. [] Progressing: [] Met: [x] Not Met: [] Adjusted  2. Patient will demonstrate increased AROM to WNL to allow for proper joint functioning as indicated by patients Functional Deficits. [] Progressing: [] Met: [x] Not Met: [] Adjusted  3. Patient will demonstrate an increase in Strength >/=4+/5 to allow for proper functional mobility as indicated by patients Functional Deficits. [] Progressing: [] Met: [x] Not Met: [] Adjusted  4. Patient will return to sitting/work functional activities without increased symptoms or restriction. [] Progressing: [] Met: [x] Not Met: [] Adjusted  5. Pt will sleep through the night.    [] Progressing: [] Met: [x] Not Met: [] Adjusted      Electronically signed by:  Dominga Crane, PT  , DPT, CDNT

## 2020-08-19 ENCOUNTER — HOSPITAL ENCOUNTER (OUTPATIENT)
Dept: PHYSICAL THERAPY | Age: 50
Setting detail: THERAPIES SERIES
Discharge: HOME | End: 2020-08-19
Payer: COMMERCIAL

## 2020-08-19 PROCEDURE — 97110 THERAPEUTIC EXERCISES: CPT | Performed by: PHYSICAL THERAPIST

## 2020-08-19 PROCEDURE — 97140 MANUAL THERAPY 1/> REGIONS: CPT | Performed by: PHYSICAL THERAPIST

## 2020-08-19 NOTE — FLOWSHEET NOTE
[facility],  [address]  Phone: [phone]  Fax [phone]    Physical Therapy Daily Treatment Note  Date:  2020    Patient Name:  Janna Chisholm    :  1970  MRN: [id number]  Restrictions/Precautions:    Physician Information:  Referring Practitioner: Ventura Terrell MD, Vicente Mitchell  Medical/Treatment Diagnosis Information:  · Diagnosis: M79.602 upper limb pain  Treatment Diagnosis: Left shoulder pain M25.512, Cervical pain M54.2  [x] Conservative / [] Surgical - DOS:   Insurance/Certification information:     Plan of care signed: [] YES  [] NO  Number of Comorbidities:  []0     [x]1-2    []3+      Is this a Progress Report:     []  Yes  [x]  No      If Yes:  Date Range for reporting period:  Beginning 2020  Ending     Progress report will be due (10 Rx or 30 days whichever is less): 310       Recertification will be due (POC Duration  / 90 days whichever is less): 10/17/2020     Visit # Insurance Allowable Requires auth   2  12 previously used 54    []no        []yes:        Latex Allergy:  [x]NO      []YES  Preferred Language for Healthcare:   [x]English       []other:      SUBJECTIVE:  No change.     OBJECTIVE:    Initial Initial Current   VAS 3       QDash 27%       Strength Left Right     Shoulder Flex         Shoulder Abd         Shoulder ER         Shoulder IR         ROM Left Right     Shoulder Flex [facility] WFL     Shoulder  p       Shoulder ER T3       Shoulder IR L4       THORACIC ROM         flexion         ext     R/L   Rotation R/L Restricted and p   R/L   SB R/L            CERV ROM Left Right     Cervical Flexion 30 UT p L       Cervical Extension 40 UT p L       Cervical SB R/L 10 p L 35 R/L   Cervical rotation R/L ~50 p ~ 65 p R/L        Observation: elevated ribs 1/2, PROM SB L 30, rotation 20 with pain and guarding   Palpation: + ulnar nerve tension for tightness B- no symptom replication   No pain with prone PAs         RESTRICTIONS/PRECAUTIONS: HTN, OA, R shoulder surgery    Exercises/Interventions:   Therapeutic Ex Sets/reps Notes   Supine chin tuck 1 pillow and 2 towels x15    SBS supine x20                             Pt ed centralization; ergonomic set up; limiting aggravating factors; unloading; sleeping strategies 10 min              Seated rib 1/2 mob with SB R x10 Consistent cues  trialed SB L but unable to do d/t pain                                           Manual Intervention     sideglides to L for opening 6 min C5-C7  G2   Manual traction     Prone PAS C6-T3 5 min    Prone 2nd rib mob 5 min relief   Supine rib 1/2 depression with UE flex, HABD/HADD x1    Supine upslips for L rotation 2 min Guarding before p   L SB R rot mobs 4 min    NMR re-education                                                 Therapeutic Exercise and NMR EXR  [x] (82434) Provided verbal/tactile cueing for activities related to strengthening, flexibility, endurance, ROM  for improvements in scapular, scapulothoracic and UE control with self care, reaching, carrying, lifting, house/yardwork, driving/computer work.    [] (17786) Provided verbal/tactile cueing for activities related to improving balance, coordination, kinesthetic sense, posture, motor skill, proprioception  to assist with  scapular, scapulothoracic and UE control with self care, reaching, carrying, lifting, house/yardwork, driving/computer work. Therapeutic Activities:    [] (83968 or 79444) Provided verbal/tactile cueing for activities related to improving balance, coordination, kinesthetic sense, posture, motor skill, proprioception and motor activation to allow for proper function of scapular, scapulothoracic and UE control with self care, carrying, lifting, driving/computer work.      Home Exercise Program:    [x] (41225) Reviewed/Progressed HEP activities related to strengthening, flexibility, endurance, ROM of scapular, scapulothoracic and UE control with self care, reaching, carrying, lifting, house/yardwork, driving/computer work  [] (94651) Reviewed/Progressed HEP activities related to improving balance, coordination, kinesthetic sense, posture, motor skill, proprioception of scapular, scapulothoracic and UE control with self care, reaching, carrying, lifting, house/yardwork, driving/computer work      Manual Treatments:  PROM / STM / Oscillations-Mobs:  G-I, II, III, IV (PA's, Inf., Post.)  [] ([id number]) Provided manual therapy to mobilize soft tissue/joints of cervical/CT, scapular GHJ and UE for the purpose of modulating pain, promoting relaxation,  increasing ROM, reducing/eliminating soft tissue swelling/inflammation/restriction, improving soft tissue extensibility and allowing for proper ROM for normal function with self care, reaching, carrying, lifting, house/yardwork, driving/computer work    Modalities:    [] GR/ESU 15 min    [] GR 15 min  [] ESU     [] CP    [] MHP    [] declined    Charges:  Timed Code Treatment Minutes: 45   Total Treatment Minutes: 45     [x] EVAL (LOW) 17675 (typically 20 minutes face-to-face)  [] EVAL (MOD) 97375 (typically 30 minutes face-to-face)  [] EVAL (HIGH) 423 8935 (typically 45 minutes face-to-face)  [] RE-EVAL     [x] CX(14337) x   1  [] IONTO  [] NMR (16804) x     [] VASO  [x] Manual (15962) x  2    [] Other:  [] TA x      [] Mech Traction (20297)  [] ES(attended) (00230)      [] ES (un) (18525):     GOALS:  Long Term Goals: To be achieved in: 6 weeks  1. Disability index score of 5% or less for the QDash  to assist with reaching prior level of function. [] Progressing: [] Met: [x] Not Met: [] Adjusted  2. Patient will demonstrate increased AROM to WNL to allow for proper joint functioning as indicated by patients Functional Deficits. [] Progressing: [] Met: [x] Not Met: [] Adjusted  3.  Patient will demonstrate an increase in Strength >/=4+/5 to allow for proper functional mobility as indicated by patients Functional Deficits. [] Progressing: [] Met: [x] Not Met: [] Adjusted  4. Patient will return to sitting/work functional activities without increased symptoms or restriction. [] Progressing: [] Met: [x] Not Met: [] Adjusted  5. Pt will sleep through the night. [] Progressing: [] Met: [x] Not Met: [] Adjusted    Overall Progression Towards Functional goals/ Treatment Progress Update:  [] Patient is progressing as expected towards functional goals listed. [] Progression is slowed due to complexities/Impairments listed. [] Progression has been slowed due to co-morbidities. [x] Plan just implemented, too soon to assess goals progression <30days   [] Goals require adjustment due to lack of progress  [] Patient is not progressing as expected and requires additional follow up with physician  [] Other    Prognosis for POC: [x] Good [] Fair  [] Poor      Patient requires continued skilled intervention: [x] Yes  [] No      ASSESSMENT:  Guarding before pain. Pt fearful of pain. PROM improved slightly post-mobs. Best relief with rib mobs. Trialed seated rib 1/2 depression with SB- able to SB R, but not L.  CT junction most restricted. Treatment/Activity Tolerance:  [x] Patient tolerated treatment well [] Patient limited by fatique  [] Patient limited by pain  [] Patient limited by other medical complications  [] Other:     Prognosis: [] Good [] Fair  [] Poor    Patient Requires Follow-up: [x] Yes  [] No    PLAN: See eval  [] Continue per plan of care [] Alter current plan (see comments above)  [x] Plan of care initiated [] Hold pending MD visit [] Discharge      Electronically signed by:  Kleber Green, PT , DPT, CDNT    Note: If patient does not return for scheduled/ recommended follow up visits, this note will serve as a discharge from care along with most recent update on progress.

## 2020-08-24 ENCOUNTER — HOSPITAL ENCOUNTER (OUTPATIENT)
Dept: PHYSICAL THERAPY | Age: 50
Setting detail: THERAPIES SERIES
Discharge: HOME | End: 2020-08-24
Payer: COMMERCIAL

## 2020-08-24 PROCEDURE — 97110 THERAPEUTIC EXERCISES: CPT | Performed by: PHYSICAL THERAPIST

## 2020-08-24 PROCEDURE — 97140 MANUAL THERAPY 1/> REGIONS: CPT | Performed by: PHYSICAL THERAPIST

## 2020-08-24 PROCEDURE — G0283 ELEC STIM OTHER THAN WOUND: HCPCS | Performed by: PHYSICAL THERAPIST

## 2020-08-24 PROCEDURE — 97112 NEUROMUSCULAR REEDUCATION: CPT | Performed by: PHYSICAL THERAPIST

## 2020-08-24 NOTE — FLOWSHEET NOTE
[facility],  [address]  Phone: [phone]  Fax [phone]    Physical Therapy Daily Treatment Note  Date:  2020    Patient Name:  David Tovar    :  1970  MRN: [id number]  Restrictions/Precautions:    Physician Information:  Referring Practitioner: Key Bundy MD, Sneha Tipton  Medical/Treatment Diagnosis Information:  · Diagnosis: M79.602 upper limb pain  Treatment Diagnosis: Left shoulder pain M25.512, Cervical pain M54.2  [x] Conservative / [] Surgical - DOS:   Insurance/Certification information:     Plan of care signed: [] YES  [] NO  Number of Comorbidities:  []0     [x]1-2    []3+      Is this a Progress Report:     []  Yes  [x]  No      If Yes:  Date Range for reporting period:  Beginning 2020  Ending     Progress report will be due (10 Rx or 30 days whichever is less):        Recertification will be due (POC Duration  / 90 days whichever is less): 10/17/2020     Visit # Insurance Allowable Requires auth   3  12 previously used 54    []no        []yes:        Latex Allergy:  [x]NO      []YES  Preferred Language for Healthcare:   [x]English       []other:      SUBJECTIVE:  No change; maybe a little more mobility. Noticing more shoulder blade pain and ache.     OBJECTIVE:    Initial Initial Current   VAS 3       QDash 27%       Strength Left Right     Shoulder Flex         Shoulder Abd         Shoulder ER         Shoulder IR         ROM Left Right     Shoulder Flex [facility] WFL     Shoulder  p       Shoulder ER T3       Shoulder IR L4       THORACIC ROM         flexion         ext     R/L   Rotation R/L Restricted and p   R/L   SB R/L            CERV ROM Left Right     Cervical Flexion 30 UT p L       Cervical Extension 40 UT p L       Cervical SB R/L 10 p L 35 R/L   Cervical rotation R/L ~50 p ~ 65 p R/L        Observation: elevated ribs 1/2, PROM SB L 30, AROM supine ~45; excessive coordination, kinesthetic sense, posture, motor skill, proprioception and motor activation to allow for proper function of scapular, scapulothoracic and UE control with self care, carrying, lifting, driving/computer work. Home Exercise Program:    [x] (79463) Reviewed/Progressed HEP activities related to strengthening, flexibility, endurance, ROM of scapular, scapulothoracic and UE control with self care, reaching, carrying, lifting, house/yardwork, driving/computer work  [] (95762) Reviewed/Progressed HEP activities related to improving balance, coordination, kinesthetic sense, posture, motor skill, proprioception of scapular, scapulothoracic and UE control with self care, reaching, carrying, lifting, house/yardwork, driving/computer work      Manual Treatments:  PROM / STM / Oscillations-Mobs:  G-I, II, III, IV (PA's, Inf., Post.)  [] (08518) Provided manual therapy to mobilize soft tissue/joints of cervical/CT, scapular GHJ and UE for the purpose of modulating pain, promoting relaxation,  increasing ROM, reducing/eliminating soft tissue swelling/inflammation/restriction, improving soft tissue extensibility and allowing for proper ROM for normal function with self care, reaching, carrying, lifting, house/yardwork, driving/computer work    Modalities:    [] GR/ESU 15 min    [] GR 15 min  [x] ESU     [] CP    [x] MHP    [] declined    Charges:  Timed Code Treatment Minutes: 65   Total Treatment Minutes: 80     [x] EVAL (LOW) 44088 (typically 20 minutes face-to-face)  [] EVAL (MOD) 31272 (typically 30 minutes face-to-face)  [] EVAL (HIGH) 31165 (typically 45 minutes face-to-face)  [] RE-EVAL     [x] SN(01996) x   1  [] IONTO  [x] NMR (49720) x  1   [] VASO  [x] Manual (32292) x  2    [] Other:  [] TA x      [] Mech Traction (68245)  [] ES(attended) (42481)      [] ES (un) (35526):     GOALS:  Long Term Goals: To be achieved in: 6 weeks  1.  Disability index score of 5% or less for the QDash  to assist with reaching prior level of function. [] Progressing: [] Met: [x] Not Met: [] Adjusted  2. Patient will demonstrate increased AROM to WNL to allow for proper joint functioning as indicated by patients Functional Deficits. [] Progressing: [] Met: [x] Not Met: [] Adjusted  3. Patient will demonstrate an increase in Strength >/=4+/5 to allow for proper functional mobility as indicated by patients Functional Deficits. [] Progressing: [] Met: [x] Not Met: [] Adjusted  4. Patient will return to sitting/work functional activities without increased symptoms or restriction. [] Progressing: [] Met: [x] Not Met: [] Adjusted  5. Pt will sleep through the night. [] Progressing: [] Met: [x] Not Met: [] Adjusted    Overall Progression Towards Functional goals/ Treatment Progress Update:  [] Patient is progressing as expected towards functional goals listed. [] Progression is slowed due to complexities/Impairments listed. [] Progression has been slowed due to co-morbidities. [x] Plan just implemented, too soon to assess goals progression <30days   [] Goals require adjustment due to lack of progress  [] Patient is not progressing as expected and requires additional follow up with physician  [] Other    Prognosis for POC: [x] Good [] Fair  [] Poor      Patient requires continued skilled intervention: [x] Yes  [] No      ASSESSMENT:  Continued to guarding with cervical manuals. Decreased symptoms and tissue tension with prone rib mobs and scapular work. Nerve glides positive for tightness, but no replication of symptoms. Tingling returns when seated. Discussed posture, scap position, decreasing UT activity during TB shoulder work.   Treatment/Activity Tolerance:  [x] Patient tolerated treatment well [] Patient limited by fatique  [] Patient limited by pain  [] Patient limited by other medical complications  [] Other:     Prognosis: [] Good [] Fair  [] Poor    Patient Requires Follow-up: [x] Yes  [] No    PLAN: See eval  [x] Continue per plan of care [] Alter current plan (see comments above)  [] Plan of care initiated [] Hold pending MD visit [] Discharge      Electronically signed by:  Humberto Schafer PT , DPT, CDNT    Note: If patient does not return for scheduled/ recommended follow up visits, this note will serve as a discharge from care along with most recent update on progress.

## 2020-08-25 ENCOUNTER — APPOINTMENT (OUTPATIENT)
Dept: PHYSICAL THERAPY | Age: 50
End: 2020-08-25
Payer: COMMERCIAL

## 2020-08-26 ENCOUNTER — HOSPITAL ENCOUNTER (OUTPATIENT)
Dept: PHYSICAL THERAPY | Age: 50
Setting detail: THERAPIES SERIES
Discharge: HOME | End: 2020-08-26
Payer: COMMERCIAL

## 2020-08-26 PROCEDURE — 97140 MANUAL THERAPY 1/> REGIONS: CPT | Performed by: PHYSICAL THERAPIST

## 2020-08-26 PROCEDURE — 97110 THERAPEUTIC EXERCISES: CPT | Performed by: PHYSICAL THERAPIST

## 2020-08-26 PROCEDURE — 97112 NEUROMUSCULAR REEDUCATION: CPT | Performed by: PHYSICAL THERAPIST

## 2020-08-26 NOTE — FLOWSHEET NOTE
[facility],  [address] [place]  Phone: [phone]  Fax [phone]    Physical Therapy Daily Treatment Note  Date:  2020    Patient Name:  Fadumo Lopez    :  1970  MRN: [id number]  Restrictions/Precautions:    Physician Information:  Referring Practitioner: Greg Zelaya MD, Mike   Medical/Treatment Diagnosis Information:  · Diagnosis: M79.602 upper limb pain  Treatment Diagnosis: Left shoulder pain M25.512, Cervical pain M54.2  [x] Conservative / [] Surgical - DOS:   Insurance/Certification information:     Plan of care signed: [] YES  [] NO  Number of Comorbidities:  []0     [x]1-2    []3+      Is this a Progress Report:     []  Yes  [x]  No      If Yes:  Date Range for reporting period:  Beginning 2020  Ending     Progress report will be due (10 Rx or 30 days whichever is less):        Recertification will be due (POC Duration  / 90 days whichever is less): 10/17/2020     Visit # Insurance Allowable Requires auth   4  12 previously used 54    []no        []yes:        Latex Allergy:  [x]NO      []YES  Preferred Language for Healthcare:   [x]English       []other:      SUBJECTIVE:  Pt reports improvement. Increased tingling with pressure on forearm when typing.     OBJECTIVE:    Initial Initial Current   VAS 3       QDash 27%       Strength Left Right     Shoulder Flex         Shoulder Abd         Shoulder ER         Shoulder IR         ROM Left Right     Shoulder Flex [facility] WFL     Shoulder  p       Shoulder ER T3       Shoulder IR L4       THORACIC ROM         flexion         ext     R/L   Rotation R/L Restricted and p   R/L   SB R/L            CERV ROM Left Right     Cervical Flexion 30 UT p L       Cervical Extension 40 UT p L       Cervical SB R/L 10 p L 35 R/L   Cervical rotation R/L ~50 p ~ 65 p R/L        Observation: elevated ribs 1/2, PROM SB L 30, AROM supine ~45; excessive UT recruitment with scap squeeze   Palpation: + ulnar nerve tension for tightness B, tightness noted in median nerve B   No pain with prone PAs   Spasm and restriction upper rhomboid            RESTRICTIONS/PRECAUTIONS: HTN, OA, R shoulder surgery    Exercises/Interventions:   Therapeutic Ex Sets/reps Notes   Supine chin tuck 1 pillow and 2 towels     SBS supine                              Pt ed centralization; ergonomic set up; limiting aggravating factors; unloading; sleeping strategies 10 min              Seated rib 1/2 mob with SB R  Consistent cues  trialed SB L but unable to do d/t pain   Supine median nerve glide     Supine ulnar nerve glide 2x10    SL thoracic rotaion x15 R, x8 L    SL scap depression x15    SL IR stretch with scap retraction :5x10    Corner stretch :20x4              Manual Intervention     sideglides to R for opening 6 min  :20 sec intervals C6-7  G2     Seated thoracic hug     Prone PAS C6-T3     Prone 2nd rib mob 5 min relief   Subscap/Lat STW 10 min    Prone thoracic PAs 4 min    STW rhomboids 6 min    scap mobs prone/SL 7min/4 min    NMR re-education     SL depression, retraction x25 each Consistent cues required   Seated scap retraction 5 min Consistent cues to decrease UT activation   C-R L rotation :5x10                                 Therapeutic Exercise and NMR EXR  [x] (07402) Provided verbal/tactile cueing for activities related to strengthening, flexibility, endurance, ROM  for improvements in scapular, scapulothoracic and UE control with self care, reaching, carrying, lifting, house/yardwork, driving/computer work.    [] (27040) Provided verbal/tactile cueing for activities related to improving balance, coordination, kinesthetic sense, posture, motor skill, proprioception  to assist with  scapular, scapulothoracic and UE control with self care, reaching, carrying, lifting, house/yardwork, driving/computer work.     Therapeutic Activities:    [] (22823 or 86534) Provided Disability index score of 5% or less for the QDash  to assist with reaching prior level of function. [] Progressing: [] Met: [x] Not Met: [] Adjusted  2. Patient will demonstrate increased AROM to WNL to allow for proper joint functioning as indicated by patients Functional Deficits. [] Progressing: [] Met: [x] Not Met: [] Adjusted  3. Patient will demonstrate an increase in Strength >/=4+/5 to allow for proper functional mobility as indicated by patients Functional Deficits. [] Progressing: [] Met: [x] Not Met: [] Adjusted  4. Patient will return to sitting/work functional activities without increased symptoms or restriction. [] Progressing: [] Met: [x] Not Met: [] Adjusted  5. Pt will sleep through the night. [] Progressing: [] Met: [x] Not Met: [] Adjusted    Overall Progression Towards Functional goals/ Treatment Progress Update:  [] Patient is progressing as expected towards functional goals listed. [] Progression is slowed due to complexities/Impairments listed. [] Progression has been slowed due to co-morbidities. [x] Plan just implemented, too soon to assess goals progression <30days   [] Goals require adjustment due to lack of progress  [] Patient is not progressing as expected and requires additional follow up with physician  [] Other    Prognosis for POC: [x] Good [] Fair  [] Poor      Patient requires continued skilled intervention: [x] Yes  [] No      ASSESSMENT: Progressing well. Likely combo of ulnar nerve irritation d/t compression pressure at desk, facet irritation, and muscle tension.       Treatment/Activity Tolerance:  [x] Patient tolerated treatment well [] Patient limited by fatique  [] Patient limited by pain  [] Patient limited by other medical complications  [] Other:     Prognosis: [] Good [] Fair  [] Poor    Patient Requires Follow-up: [x] Yes  [] No    PLAN: See eval  [x] Continue per plan of care [] Alter current plan (see comments above)  [] Plan of care initiated [] Hold

## 2020-09-02 ENCOUNTER — HOSPITAL ENCOUNTER (OUTPATIENT)
Dept: PHYSICAL THERAPY | Age: 50
Setting detail: THERAPIES SERIES
Discharge: HOME | End: 2020-09-02
Payer: COMMERCIAL

## 2020-09-02 PROCEDURE — 97110 THERAPEUTIC EXERCISES: CPT | Performed by: PHYSICAL THERAPIST

## 2020-09-02 PROCEDURE — 97112 NEUROMUSCULAR REEDUCATION: CPT | Performed by: PHYSICAL THERAPIST

## 2020-09-02 PROCEDURE — 97140 MANUAL THERAPY 1/> REGIONS: CPT | Performed by: PHYSICAL THERAPIST

## 2020-09-02 NOTE — FLOWSHEET NOTE
recruitment with scap squeeze   Palpation: + ulnar nerve tension for tightness B, tightness noted in median nerve B   No pain with prone PAs   Spasm and restriction upper rhomboid            RESTRICTIONS/PRECAUTIONS: HTN, OA, R shoulder surgery    Exercises/Interventions:   Therapeutic Ex Sets/reps Notes   Supine chin tuck 1 pillow and 2 towels     SBS supine                              Pt ed centralization; ergonomic set up; limiting aggravating factors; unloading; sleeping strategies               Seated rib 1/2 mob with SB R  Consistent cues  trialed SB L but unable to do d/t pain   Supine median nerve glide     Supine ulnar nerve glide 2x10    SL thoracic rotaion x15 R, x8 L    SL scap depression x15    SL IR stretch with scap retraction :5x10    Corner stretch doorway multi angle :20x4    SL ER :10x15 Tc         Manual Intervention     sideglides to R for opening 6 min  :20 sec intervals C6-7  G2     Seated thoracic hug     GH post mobs/PROM 4 min/5 min    Prone 2nd rib mob 5 min relief   Subscap/Lat STW 10 min    Prone thoracic PAs     STW rhomboids 6 min    scap mobs prone/SL 7min/4 min    NMR re-education     SL depression, retraction x25 each Consistent cues required   Seated scap retraction 5 min Consistent cues to decrease UT activation   C-R L rotation                                  Therapeutic Exercise and NMR EXR  [x] (86901) Provided verbal/tactile cueing for activities related to strengthening, flexibility, endurance, ROM  for improvements in scapular, scapulothoracic and UE control with self care, reaching, carrying, lifting, house/yardwork, driving/computer work.    [] (50324) Provided verbal/tactile cueing for activities related to improving balance, coordination, kinesthetic sense, posture, motor skill, proprioception  to assist with  scapular, scapulothoracic and UE control with self care, reaching, carrying, lifting, house/yardwork, driving/computer work.     Therapeutic Activities:    [] achieved in: 6 weeks  1. Disability index score of 5% or less for the QDash  to assist with reaching prior level of function. [] Progressing: [] Met: [x] Not Met: [] Adjusted  2. Patient will demonstrate increased AROM to WNL to allow for proper joint functioning as indicated by patients Functional Deficits. [] Progressing: [] Met: [x] Not Met: [] Adjusted  3. Patient will demonstrate an increase in Strength >/=4+/5 to allow for proper functional mobility as indicated by patients Functional Deficits. [] Progressing: [] Met: [x] Not Met: [] Adjusted  4. Patient will return to sitting/work functional activities without increased symptoms or restriction. [] Progressing: [] Met: [x] Not Met: [] Adjusted  5. Pt will sleep through the night. [] Progressing: [] Met: [x] Not Met: [] Adjusted    Overall Progression Towards Functional goals/ Treatment Progress Update:  [] Patient is progressing as expected towards functional goals listed. [] Progression is slowed due to complexities/Impairments listed. [] Progression has been slowed due to co-morbidities. [x] Plan just implemented, too soon to assess goals progression <30days   [] Goals require adjustment due to lack of progress  [] Patient is not progressing as expected and requires additional follow up with physician  [] Other    Prognosis for POC: [x] Good [] Fair  [] Poor      Patient requires continued skilled intervention: [x] Yes  [] No      ASSESSMENT: Improved ROM and decreased scapular tension. Pressure to subscap and IS still elicits distal symptoms. Pt would benefit from TDN to release MTrps. Continue to work to [employer] UT overuse during posture and scap TE.     Treatment/Activity Tolerance:  [x] Patient tolerated treatment well [] Patient limited by fatique  [] Patient limited by pain  [] Patient limited by other medical complications  [] Other:     Prognosis: [] Good [] Fair  [] Poor    Patient Requires Follow-up: [x] Yes  [] No    PLAN: See eval  [x] Continue per plan of care [] Alter current plan (see comments above)  [] Plan of care initiated [] Hold pending MD visit [] Discharge      Electronically signed by:  Norma Allen PT , DPT, CDNT    Note: If patient does not return for scheduled/ recommended follow up visits, this note will serve as a discharge from care along with most recent update on progress.

## 2020-09-09 ENCOUNTER — HOSPITAL ENCOUNTER (OUTPATIENT)
Dept: PHYSICAL THERAPY | Age: 50
Setting detail: THERAPIES SERIES
Discharge: HOME | End: 2020-09-09
Payer: COMMERCIAL

## 2020-09-09 PROCEDURE — 97140 MANUAL THERAPY 1/> REGIONS: CPT | Performed by: PHYSICAL THERAPIST

## 2020-09-09 PROCEDURE — 97110 THERAPEUTIC EXERCISES: CPT | Performed by: PHYSICAL THERAPIST

## 2020-09-09 NOTE — FLOWSHEET NOTE
[facility],  [address] [place]  Phone: [phone]  Fax [phone]    Physical Therapy Daily Treatment Note  Date:  2020    Patient Name:  Enrrique Tejeda    :  1970  MRN: [id number]  Restrictions/Precautions:    Physician Information:  Referring Practitioner: Melony Mujica MD  Medical/Treatment Diagnosis Information:  · Diagnosis: M79.602 upper limb pain  Treatment Diagnosis: Left shoulder pain M25.512, Cervical pain M54.2  [x] Conservative / [] Surgical - DOS:   Insurance/Certification information:     Plan of care signed: [] YES  [] NO  Number of Comorbidities:  []0     [x]1-2    []3+      Is this a Progress Report:     []  Yes  [x]  No      If Yes:  Date Range for reporting period:  Beginning 2020  Ending     Progress report will be due (10 Rx or 30 days whichever is less):        Recertification will be due (POC Duration  / 90 days whichever is less): 10/17/2020     Visit # Insurance Allowable Requires auth   6  12 previously used 54    []no        []yes:        Latex Allergy:  [x]NO      []YES  Preferred Language for Healthcare:   [x]English       []other:      SUBJECTIVE:  Pt reports improvement. Decreased tingling and improved neck movement.     OBJECTIVE:    Initial Initial Current   VAS 3       QDash 27%       Strength Left Right     Shoulder Flex         Shoulder Abd         Shoulder ER         Shoulder IR         ROM Left Right     Shoulder Flex [facility] WFL     Shoulder  p       Shoulder ER T3       Shoulder IR L4       THORACIC ROM         flexion         ext     R/L   Rotation R/L Restricted and p   R/L   SB R/L            CERV ROM Left Right     Cervical Flexion 30 UT p L       Cervical Extension 40 UT p L       Cervical SB R/L 10 p L 35 R/L   Cervical rotation R/L ~50 p ~ 65 p R/L        Observation: elevated ribs 1/2, PROM SB L 30, AROM supine ~45; excessive UT recruitment with scap squeeze   Palpation: + ulnar nerve tension for tightness B, tightness noted in median nerve B   No pain with prone PAs   Spasm and restriction upper rhomboid            RESTRICTIONS/PRECAUTIONS: HTN, OA, R shoulder surgery    Exercises/Interventions:   Therapeutic Ex Sets/reps Notes   Supine chin tuck 1 pillow and 2 towels     SBS supine                              Pt ed centralization; ergonomic set up; limiting aggravating factors; unloading; sleeping strategies               Seated rib 1/2 mob with SB R  Consistent cues  trialed SB L but unable to do d/t pain   Supine median nerve glide     Supine ulnar nerve glide     SL thoracic rotaion     SL scap depression     SL IR stretch with scap retraction :5x10    Corner stretch doorway multi angle :20x4    SL ER 2# 3x8 Tc    Prone scap set + ext :5x20    Manual Intervention     sideglides to R for opening  C6-7  G2     Seated thoracic hug     GH post mobs/PROM     Prone 2nd rib mob  relief   Subscap/Lat STW 10 min    Prone thoracic PAs 5 min  Thrust x2   Cavitation T6   STW rhomboids 6 min    scap mobs prone/SL 7min/4 min    NMR re-education     SL depression, retraction x25 each Consistent cues required   Seated scap retraction 5 min Consistent cues to decrease UT activation   C-R L rotation                                  Therapeutic Exercise and NMR EXR  [x] (45731) Provided verbal/tactile cueing for activities related to strengthening, flexibility, endurance, ROM  for improvements in scapular, scapulothoracic and UE control with self care, reaching, carrying, lifting, house/yardwork, driving/computer work.    [] (41833) Provided verbal/tactile cueing for activities related to improving balance, coordination, kinesthetic sense, posture, motor skill, proprioception  to assist with  scapular, scapulothoracic and UE control with self care, reaching, carrying, lifting, house/yardwork, driving/computer work.     Therapeutic Activities:    [] (04680 or weeks  1. Disability index score of 5% or less for the QDash  to assist with reaching prior level of function. [] Progressing: [] Met: [x] Not Met: [] Adjusted  2. Patient will demonstrate increased AROM to WNL to allow for proper joint functioning as indicated by patients Functional Deficits. [] Progressing: [] Met: [x] Not Met: [] Adjusted  3. Patient will demonstrate an increase in Strength >/=4+/5 to allow for proper functional mobility as indicated by patients Functional Deficits. [] Progressing: [] Met: [x] Not Met: [] Adjusted  4. Patient will return to sitting/work functional activities without increased symptoms or restriction. [] Progressing: [] Met: [x] Not Met: [] Adjusted  5. Pt will sleep through the night. [] Progressing: [] Met: [x] Not Met: [] Adjusted    Overall Progression Towards Functional goals/ Treatment Progress Update:  [] Patient is progressing as expected towards functional goals listed. [] Progression is slowed due to complexities/Impairments listed. [] Progression has been slowed due to co-morbidities. [x] Plan just implemented, too soon to assess goals progression <30days   [] Goals require adjustment due to lack of progress  [] Patient is not progressing as expected and requires additional follow up with physician  [] Other    Prognosis for POC: [x] Good [] Fair  [] Poor      Patient requires continued skilled intervention: [x] Yes  [] No      ASSESSMENT: progressing with current treatment plan. Continue to mobilize thoracic spine and periscapular musculature.     Treatment/Activity Tolerance:  [x] Patient tolerated treatment well [] Patient limited by fatique  [] Patient limited by pain  [] Patient limited by other medical complications  [] Other:     Prognosis: [] Good [] Fair  [] Poor    Patient Requires Follow-up: [x] Yes  [] No    PLAN: See eval  [x] Continue per plan of care [] Alter current plan (see comments above)  [] Plan of care initiated [] Hold pending MD visit [] Discharge      Electronically signed by:  Cooper Frank, PT , DPT, CDNT    Note: If patient does not return for scheduled/ recommended follow up visits, this note will serve as a discharge from care along with most recent update on progress.

## 2020-09-15 ENCOUNTER — HOSPITAL ENCOUNTER (OUTPATIENT)
Dept: PHYSICAL THERAPY | Age: 50
Setting detail: THERAPIES SERIES
Discharge: HOME | End: 2020-09-15
Payer: COMMERCIAL

## 2020-09-15 NOTE — FLOWSHEET NOTE
[facility],  [address]        Physical Therapy  Cancellation/No-show Note  Patient Name:  Ariana Gorman  :  1970   Date:  9/15/2020  Cancelled visits to date: 1  No-shows to date: 0    For today's appointment patient:  [x]  Cancelled  []  Rescheduled appointment  []  No-show     Reason given by patient:  []  Patient ill  []  Conflicting appointment  []  No transportation    []  Conflict with work  [x]  No reason given  []  Other:     Comments:      Electronically signed by:  Shaun Vega, PT , DPT, CDNT

## 2020-09-21 ENCOUNTER — HOSPITAL ENCOUNTER (OUTPATIENT)
Dept: PHYSICAL THERAPY | Age: 50
Setting detail: THERAPIES SERIES
Discharge: HOME | End: 2020-09-21
Payer: COMMERCIAL

## 2020-09-21 PROCEDURE — 97140 MANUAL THERAPY 1/> REGIONS: CPT | Performed by: PHYSICAL THERAPIST

## 2020-09-21 PROCEDURE — 97110 THERAPEUTIC EXERCISES: CPT | Performed by: PHYSICAL THERAPIST

## 2020-09-21 NOTE — FLOWSHEET NOTE
[facility],  [address]  Phone: [phone]  Fax [phone]    Physical Therapy Daily Treatment Note  Date:  2020    Patient Name:  Orlin Boland    :  1970  MRN: [id number]  Restrictions/Precautions:    Physician Information:  Referring Practitioner: Manuel Boyd MD, Refugio Lynn  Medical/Treatment Diagnosis Information:  · Diagnosis: M79.602 upper limb pain  Treatment Diagnosis: Left shoulder pain M25.512, Cervical pain M54.2  [x] Conservative / [] Surgical - DOS:   Insurance/Certification information:     Plan of care signed: [] YES  [] NO  Number of Comorbidities:  []0     [x]1-2    []3+      Is this a Progress Report:     [x]  Yes  []  No      If Yes:  Date Range for reporting period:  Beginning 2020  Ending 2020    Progress report will be due (10 Rx or 30 days whichever is less): 15/22/1384       Recertification will be due (POC Duration  / 90 days whichever is less): 10/17/2020     Visit # Insurance Allowable Requires auth   7  12 previously used 54    []no        []yes:        Latex Allergy:  [x]NO      []YES  Preferred Language for Healthcare:   [x]English       []other:      SUBJECTIVE:  Pt reports improvement; more neck mobility and arm mobility. He has minimal pain, but the tingling in the hand is still there and sometimes the elbow is aggravated.       OBJECTIVE:    Initial Initial Current   VAS 3    0-3   QDash 27%       Strength Left Right     Shoulder Flex         Shoulder Abd         Shoulder ER         scap ext, HABD   4-   Shoulder IR         ROM Left Right     Shoulder Flex [facility] WFL    Shoulder  p     tension   Shoulder ER T3   T3    Shoulder IR L4    L3   THORACIC ROM         flexion         ext     R/L   Rotation R/L Restricted and p   R/L restricted, but symmetrical   SB R/L            CERV ROM Left Right     Cervical Flexion 30 UT p L   40    Cervical Extension 40 UT p L    30   Cervical SB R/L 10 p L 35 R/L 30/30 strain   Cervical rotation R/L ~50 p ~ 65 p R/L WFL/60        Observation:    Palpation: + ulnar nerve tension for tightness B               RESTRICTIONS/PRECAUTIONS: HTN, OA, R shoulder surgery    Exercises/Interventions:   Therapeutic Ex Sets/reps Notes   Supine chin tuck 1 pillow and 2 towels     SBS supine                              Pt ed centralization; ergonomic set up; limiting aggravating factors; unloading; sleeping strategies               Seated rib 1/2 mob with SB R  Consistent cues  trialed SB L but unable to do d/t pain   Supine median nerve glide     Supine ulnar nerve glide     SL thoracic rotaion     SL scap depression     SL IR stretch with scap retraction     Corner stretch doorway multi angle     SL ER  Tc    Prone scap set + ext     Manual Intervention     IASTM tricep 6 min    Seated thoracic hug     SL ABD with scap tacking :5x10 2 rounds    Prone 2nd rib mob  relief   Subscap/Lat STW 10 min    Prone thoracic PAs    Cavitation T6   STW rhomboids     scap mobs prone/SL 7min/4 min    NMR re-education     SL depression, retraction x25 each Consistent cues required   Seated scap retraction 5 min Consistent cues to decrease UT activation   C-R L rotation          SL ABD lat activation 2x10 RTB  Significant weakness                      Therapeutic Exercise and NMR EXR  [x] (01600) Provided verbal/tactile cueing for activities related to strengthening, flexibility, endurance, ROM  for improvements in scapular, scapulothoracic and UE control with self care, reaching, carrying, lifting, house/yardwork, driving/computer work.    [] (77702) Provided verbal/tactile cueing for activities related to improving balance, coordination, kinesthetic sense, posture, motor skill, proprioception  to assist with  scapular, scapulothoracic and UE control with self care, reaching, carrying, lifting, house/yardwork, driving/computer work.     Therapeutic Activities:    [] (65651 or 92504) Provided verbal/tactile cueing for activities related to improving balance, coordination, kinesthetic sense, posture, motor skill, proprioception and motor activation to allow for proper function of scapular, scapulothoracic and UE control with self care, carrying, lifting, driving/computer work.      Home Exercise Program:    [x] (87912) Reviewed/Progressed HEP activities related to strengthening, flexibility, endurance, ROM of scapular, scapulothoracic and UE control with self care, reaching, carrying, lifting, house/yardwork, driving/computer work  [] (25619) Reviewed/Progressed HEP activities related to improving balance, coordination, kinesthetic sense, posture, motor skill, proprioception of scapular, scapulothoracic and UE control with self care, reaching, carrying, lifting, house/yardwork, driving/computer work      Manual Treatments:  PROM / STM / Oscillations-Mobs:  G-I, II, III, IV (PA's, Inf., Post.)  [] (03939) Provided manual therapy to mobilize soft tissue/joints of cervical/CT, scapular GHJ and UE for the purpose of modulating pain, promoting relaxation,  increasing ROM, reducing/eliminating soft tissue swelling/inflammation/restriction, improving soft tissue extensibility and allowing for proper ROM for normal function with self care, reaching, carrying, lifting, house/yardwork, driving/computer work    Modalities:    [] GR/ESU 15 min    [] GR 15 min  [] ESU     [] CP    [] MHP    [x] declined    Charges:  Timed Code Treatment Minutes: 40   Total Treatment Minutes: 40     [x] EVAL (LOW) 56021 (typically 20 minutes face-to-face)  [] EVAL (MOD) 73201 (typically 30 minutes face-to-face)  [] EVAL (HIGH) 65085 (typically 45 minutes face-to-face)  [] RE-EVAL     [x] HU(82815) x   1  [] IONTO  [] NMR (06606) x     [] VASO  [x] Manual (52155) x  2    [] Other:  [] TA x      [] Mech Traction (79340)  [] ES(attended) (43969)      [] ES (un) (56183):     GOALS:  Long Term No    PLAN: See eval  [x] Continue per plan of care: 1-2x/wk 4 wks [] Alter current plan (see comments above)  [] Plan of care initiated [] Hold pending MD visit [] Discharge      Electronically signed by:  Wali Fischer, PT , DPT, CDNT    Note: If patient does not return for scheduled/ recommended follow up visits, this note will serve as a discharge from care along with most recent update on progress.

## 2020-09-29 ENCOUNTER — APPOINTMENT (OUTPATIENT)
Dept: PHYSICAL THERAPY | Age: 50
End: 2020-09-29
Payer: COMMERCIAL

## 2020-10-06 ENCOUNTER — HOSPITAL ENCOUNTER (OUTPATIENT)
Dept: PHYSICAL THERAPY | Age: 50
Setting detail: THERAPIES SERIES
Discharge: HOME | End: 2020-10-06
Payer: COMMERCIAL

## 2020-10-06 PROCEDURE — 97140 MANUAL THERAPY 1/> REGIONS: CPT | Performed by: PHYSICAL THERAPIST

## 2020-10-06 PROCEDURE — 97112 NEUROMUSCULAR REEDUCATION: CPT | Performed by: PHYSICAL THERAPIST

## 2020-10-06 PROCEDURE — 97110 THERAPEUTIC EXERCISES: CPT | Performed by: PHYSICAL THERAPIST

## 2020-10-06 NOTE — FLOWSHEET NOTE
Extension 40 UT p L    30   Cervical SB R/L 10 p L 35 R/L 30/30 strain   Cervical rotation R/L ~50 p ~ 65 p R/L WFL/60        Observation:    Palpation: + ulnar nerve tension for tightness B               RESTRICTIONS/PRECAUTIONS: HTN, OA, R shoulder surgery    Exercises/Interventions:   Therapeutic Ex Sets/reps Notes   Supine chin tuck 1 pillow and 2 towels     SBS supine                              Pt ed centralization; ergonomic set up; limiting aggravating factors; unloading; sleeping strategies               TB ext, ER ,IR  2x10 each GTB,    Supine median nerve glide     Supine ulnar nerve glide     SL thoracic rotaion     SL scap depression     SL IR stretch with scap retraction     Corner stretch doorway multi angle     SL ER  Tc    Prone scap set + ext     Manual Intervention     STW tricep 6 min    Seated thoracic hug     SL ABD with scap tacking :5x10 2 rounds    Prone 2nd rib mob  relief   Subscap/Lat STW 10 min    Prone thoracic PAs    Cavitation T6   STW rhomboids     scap mobs prone/SL 7min/4 min    NMR re-education     SL depression, retraction x25 each Consistent cues required   Seated scap retraction 5 min Consistent cues to decrease UT activation   C-R L rotation          SL ABD lat activation 2x10 RTB                        Therapeutic Exercise and NMR EXR  [x] (12308) Provided verbal/tactile cueing for activities related to strengthening, flexibility, endurance, ROM  for improvements in scapular, scapulothoracic and UE control with self care, reaching, carrying, lifting, house/yardwork, driving/computer work.    [] (13803) Provided verbal/tactile cueing for activities related to improving balance, coordination, kinesthetic sense, posture, motor skill, proprioception  to assist with  scapular, scapulothoracic and UE control with self care, reaching, carrying, lifting, house/yardwork, driving/computer work.     Therapeutic Activities:    [] (93694 or 56494) Provided verbal/tactile cueing for activities related to improving balance, coordination, kinesthetic sense, posture, motor skill, proprioception and motor activation to allow for proper function of scapular, scapulothoracic and UE control with self care, carrying, lifting, driving/computer work. Home Exercise Program:    [x] (74568) Reviewed/Progressed HEP activities related to strengthening, flexibility, endurance, ROM of scapular, scapulothoracic and UE control with self care, reaching, carrying, lifting, house/yardwork, driving/computer work  [] (65787) Reviewed/Progressed HEP activities related to improving balance, coordination, kinesthetic sense, posture, motor skill, proprioception of scapular, scapulothoracic and UE control with self care, reaching, carrying, lifting, house/yardwork, driving/computer work      Manual Treatments:  PROM / STM / Oscillations-Mobs:  G-I, II, III, IV (PA's, Inf., Post.)  [] (32907) Provided manual therapy to mobilize soft tissue/joints of cervical/CT, scapular GHJ and UE for the purpose of modulating pain, promoting relaxation,  increasing ROM, reducing/eliminating soft tissue swelling/inflammation/restriction, improving soft tissue extensibility and allowing for proper ROM for normal function with self care, reaching, carrying, lifting, house/yardwork, driving/computer work    Modalities:    [] GR/ESU 15 min    [] GR 15 min  [] ESU     [] CP    [] MHP    [x] declined    Charges:  Timed Code Treatment Minutes: 40   Total Treatment Minutes: 40     [x] EVAL (LOW) 68849 (typically 20 minutes face-to-face)  [] EVAL (MOD) 42440 (typically 30 minutes face-to-face)  [] EVAL (HIGH) 10951 (typically 45 minutes face-to-face)  [] RE-EVAL     [x] NL(32642) x   1  [] IONTO  [x] NMR (31343) x  1   [] VASO  [x] Manual (76608) x  1    [] Other:  [] TA x      [] Mech Traction (73590)  [] ES(attended) (09721)      [] ES (un) (58703):     GOALS:  Long Term Goals: To be achieved in: 6 weeks  1.  Disability index score of 5% or less for the QDash  to assist with reaching prior level of function. [] Progressing: [] Met: [x] Not Met: [] Adjusted  2. Patient will demonstrate increased AROM to WNL to allow for proper joint functioning as indicated by patients Functional Deficits. [x] Progressing: [] Met: [] Not Met: [] Adjusted  3. Patient will demonstrate an increase in Strength >/=4+/5 to allow for proper functional mobility as indicated by patients Functional Deficits. [x] Progressing: [] Met: [] Not Met: [] Adjusted  4. Patient will return to sitting/work functional activities without increased symptoms or restriction. [] Progressing: [x] Met: [x] Not Met: [] Adjusted  5. Pt will sleep through the night. [x] Progressing: [] Met: [] Not Met: [] Adjusted    Overall Progression Towards Functional goals/ Treatment Progress Update:  [x] Patient is progressing as expected towards functional goals listed. [] Progression is slowed due to complexities/Impairments listed. [] Progression has been slowed due to co-morbidities. [] Plan just implemented, too soon to assess goals progression <30days   [] Goals require adjustment due to lack of progress  [] Patient is not progressing as expected and requires additional follow up with physician  [] Other    Prognosis for POC: [x] Good [] Fair  [] Poor      Patient requires continued skilled intervention: [x] Yes  [] No      ASSESSMENT: progressing well. Updated HEP to include TB TE. Decreased tension in tricep and lat.       Treatment/Activity Tolerance:  [x] Patient tolerated treatment well [] Patient limited by fatique  [] Patient limited by pain  [] Patient limited by other medical complications  [] Other:     Prognosis: [] Good [] Fair  [] Poor    Patient Requires Follow-up: [x] Yes  [] No    PLAN: See eval  [x] Continue per plan of care: 1-2x/wk 4 wks [] Alter current plan (see comments above)  [] Plan of care initiated [] Hold pending MD visit [] Discharge      Electronically signed by:  Iris Faust, PT , DPT, CDNT    Note: If patient does not return for scheduled/ recommended follow up visits, this note will serve as a discharge from care along with most recent update on progress.

## 2020-10-12 ENCOUNTER — HOSPITAL ENCOUNTER (OUTPATIENT)
Dept: PHYSICAL THERAPY | Age: 50
Setting detail: THERAPIES SERIES
Discharge: HOME | End: 2020-10-12
Payer: COMMERCIAL

## 2020-10-12 NOTE — FLOWSHEET NOTE
[facility],  [address]        Physical Therapy  Cancellation/No-show Note  Patient Name:  Eran Golden  :  1970   Date:  10/12/2020  Cancelled visits to date: 2  No-shows to date: 0    For today's appointment patient:  [x]  Cancelled  []  Rescheduled appointment  []  No-show     Reason given by patient:  []  Patient ill  []  Conflicting appointment  []  No transportation    []  Conflict with work  [x]  Having surgery  []  Other:     Comments:      Electronically signed by:  Satnam Albert, PT , DPT, CDNT

## 2020-11-13 ENCOUNTER — HOSPITAL ENCOUNTER (OUTPATIENT)
Dept: PHYSICAL THERAPY | Age: 50
Setting detail: THERAPIES SERIES
Discharge: HOME | End: 2020-11-13
Payer: COMMERCIAL

## 2021-01-20 NOTE — PROGRESS NOTES
Chief Complaint    No chief complaint on file. History of Present Illness:  Hoang Alexander is a 45 y.o. male who I was asked to see in consultation by Dr. Gearlean Rubinstein for evaluation of they are chief complaint of right medial ankle pain. He states is going on for 2 years. He had no history of trauma that he knows of. The pain is over the medial aspect of the ankle and the posterior medial aspect of the lower leg. He does walk for exercise on a pretty regular basis and states that it really does not bother him too much when he is on it. Medical History:  Patient's medications, allergies, past medical, surgical, social and family histories were reviewed and updated as appropriate. Review of Systems:  Pertinent items are noted in HPI  Review of systems reviewed from Patient History Form dated on 1/22/2021 and available in the patient's chart under the Media tab. Vital Signs: There were no vitals taken for this visit. General Exam:   Constitutional: Patient is adequately groomed with no evidence of malnutrition  DTRs: Deep tendon reflexes are intact  Mental Status: The patient is oriented to time, place and person. The patient's mood and affect are appropriate. Lymphatic: The lymphatic examination bilaterally reveals all areas to be without enlargement or induration. Ankle Examination:    Inspection: No significant swelling erythema or ecchymosis    Palpation: No tenderness to palpation    Range of Motion: Tight gastrocs    Strength: Within normal limits although he has difficulty of doing a single-leg toe raise on the right side    Special Tests: Anterior drawer and talar tilt show no gross laxity    Skin: There are no rashes, ulcerations or lesions.     Gait: Nonantalgic    Reflex 2+ and symmetric    Additional Comments:       Additional Examinations: Left Lower Extremity: Examination of the left lower extremity does not show any tenderness, deformity or injury. Range of motion is unremarkable. There is no gross instability. There are no rashes, ulcerations or lesions. Strength and tone are normal.     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 He did well with physical therapy in the past.      Office Procedures:  No orders of the defined types were placed in this encounter. Treatment Plan:  The etiology of posterior tib tendon dysfunction was discussed in great detail including the nonoperative and operative options. All questions were answered. Nonoperative option includes activity modification, immobilization with cast or boot, support using brace shoes and inserts, medicines when appropriate, physical therapy, injections when indicated, and topicals. Operative option includes none at this time. The patient will start on the following treatment regimen: Going to MRI his ankle and if the posterior tib tendon is damaged we will talk about his options. If it looks good I would get him back to physical therapy and he can understand that the surgery is not going to harm him and will followup with me as needed    I have evaluated the patient myself and completed the examination of the patient on date of visit.  Have discussed the case and reviewed all pertinent data with the patient

## 2021-01-22 ENCOUNTER — OFFICE VISIT (OUTPATIENT)
Dept: ORTHOPEDIC SURGERY | Age: 51
End: 2021-01-22
Payer: COMMERCIAL

## 2021-01-22 VITALS — HEIGHT: 69 IN | WEIGHT: 210.1 LBS | BODY MASS INDEX: 31.12 KG/M2

## 2021-01-22 DIAGNOSIS — M79.671 RIGHT FOOT PAIN: Primary | ICD-10-CM

## 2021-01-22 PROCEDURE — 99243 OFF/OP CNSLTJ NEW/EST LOW 30: CPT | Performed by: ORTHOPAEDIC SURGERY

## 2021-01-26 ENCOUNTER — TELEPHONE (OUTPATIENT)
Dept: ORTHOPEDIC SURGERY | Age: 51
End: 2021-01-26

## 2021-02-26 ENCOUNTER — HOSPITAL ENCOUNTER (OUTPATIENT)
Dept: PHYSICAL THERAPY | Age: 51
Setting detail: THERAPIES SERIES
Discharge: HOME | End: 2021-02-26
Payer: COMMERCIAL

## 2021-02-26 PROCEDURE — 97161 PT EVAL LOW COMPLEX 20 MIN: CPT | Performed by: PHYSICAL THERAPIST

## 2021-02-26 PROCEDURE — 97112 NEUROMUSCULAR REEDUCATION: CPT | Performed by: PHYSICAL THERAPIST

## 2021-02-26 PROCEDURE — 97110 THERAPEUTIC EXERCISES: CPT | Performed by: PHYSICAL THERAPIST

## 2021-02-26 NOTE — FLOWSHEET NOTE
[facility],  [address] [place]  Phone: [phone]  Fax [phone]    Physical Therapy Treatment Note/ Progress Report:           Date:  2021    Patient Name:  Andrea Starkey    :  1970  MRN: [id number]  Restrictions/Precautions:    Medical/Treatment Diagnosis Information:  · Diagnosis: M89.9, 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  ·  Treatment Diagnosis: R ankle pain P36.714  Insurance/Certification information:     Physician Information:  Referring Practitioner: Morena Oneil  Has the plan of care been signed (Y/N):        []  Yes  [x]  No       Is this a Progress Report:     []  Yes  [x]  No        If Yes:  Date Range for reporting period:  Beginning 2021  Ending    Progress report will be due (10 Rx or 30 days whichever is less):        Recertification will be due (POC Duration  / 90 days whichever is less): 2021      Visit # Insurance Allowable Auth Required   In-person 1  []  Yes []  No    Telehealth   []  Yes []  No    Total             Initial Initial Current   VAS 2       LEFS 41%       ROM LEFT RIGHT     Ankle DF 5 5     Functional DF 5.5 cm 5.5 cm               Ankle Ev         Strength  LEFT RIGHT     Ankle DF   5      Ankle PF   SL HR 2 in from ground      Ankle Inv   4+      Ankle EV   4+          Number of Comorbidities:  []0     [x]1-2    []3+    Latex Allergy:  [x]NO      []YES  Preferred Language for Healthcare:   [x]English       []other:    SUBJECTIVE:  See eval    OBJECTIVE: See eval   Observation:    Test measurements:      RESTRICTIONS/PRECAUTIONS:  HTN, OA    Exercises/Interventions:     Therapeutic Ex (39176) Sets/sec Reps Notes/CUES   Pt ed: anatomy with foot/ankle model use/intrinsic firing, activity mod 8 min                                                                       Manual Intervention ([id number])                                          NMR re-education (76173) CUES NEEDED   Foot tripod x     Toe yoga x8 each     SLS A-P reach x8 each     RWX L-M reach x8 each                                         Therapeutic Activity (80586)                                                Therapeutic Exercise and NMR EXR  [] (08243) Provided verbal/tactile cueing for activities related to strengthening, flexibility, endurance, ROM for improvements in LE, proximal hip, and core control with self care, mobility, lifting, ambulation.  [] (95039) Provided verbal/tactile cueing for activities related to improving balance, coordination, kinesthetic sense, posture, motor skill, proprioception  to assist with LE, proximal hip, and core control in self care, mobility, lifting, ambulation and eccentric single leg control.      NMR and Therapeutic Activities:    [] (02972 or 52026) Provided verbal/tactile cueing for activities related to improving balance, coordination, kinesthetic sense, posture, motor skill, proprioception and motor activation to allow for proper function of core, proximal hip and LE with self care and ADLs  [] (88051) Gait Re-education- Provided training and instruction to the patient for proper LE, core and proximal hip recruitment and positioning and eccentric body weight control with ambulation re-education including up and down stairs     Home Exercise Program:    [x] (29469) Reviewed/Progressed HEP activities related to strengthening, flexibility, endurance, ROM of core, proximal hip and LE for functional self-care, mobility, lifting and ambulation/stair navigation   [] (15853)Reviewed/Progressed HEP activities related to improving balance, coordination, kinesthetic sense, posture, motor skill, proprioception of core, proximal hip and LE for self care, mobility, lifting, and ambulation/stair navigation      Manual Treatments:  PROM / STM / Oscillations-Mobs:  G-I, II, III, IV (PA's, Inf., Post.)  [] (58267) Provided manual therapy to mobilize LE, proximal hip and/or LS spine Progression is slowed due to complexities/Impairments listed. [] Progression has been slowed due to co-morbidities. [x] Plan just implemented, too soon to assess goals progression <30days   [] Goals require adjustment due to lack of progress  [] Patient is not progressing as expected and requires additional follow up with physician  [] Other    Prognosis for POC: [x] Good [] Fair  [] Poor      Patient requires continued skilled intervention: [x] Yes  [] No    Treatment/Activity Tolerance:  [x] Patient able to complete treatment  [] Patient limited by fatigue  [] Patient limited by pain    [] Patient limited by other medical complications  [] Other:     ASSESSMENT: see eval    Return to Play: (if applicable)   []  Stage 1: Intro to Strength   []  Stage 2: Return to Run and Strength   []  Stage 3: Return to Jump and Strength   []  Stage 4: Dynamic Strength and Agility   []  Stage 5: Sport Specific Training     []  Ready to Return to Play, Meets All Above Stages   []  Not Ready for Return to Sports   Comments:                               PLAN: See eval  [] Continue per plan of care [] Alter current plan (see comments above)  [x] Plan of care initiated [] Hold pending MD visit [] Discharge      Electronically signed by:  Desi Prieto, PT , DPT, CDNT    Note: If patient does not return for scheduled/ recommended follow up visits, this note will serve as a discharge from care along with most recent update on progress.

## 2021-02-26 NOTE — PLAN OF CARE
[facility], [address]  Phone: [phone]  Fax [phone]     Physical Therapy Certification    Dear Referring Practitioner: Pedro Zambrano,    We had the pleasure of evaluating the following patient for physical therapy services at [address] [place] [address]. A summary of our findings can be found in the initial assessment below. This includes our plan of care. If you have any questions or concerns regarding these findings, please do not hesitate to contact me at the office phone number checked above. Thank you for the referral.       Physician Signature:_______________________________Date:__________________  By signing above (or electronic signature), therapists plan is approved by physician      Patient: Raghu Toribio   : 1970   MRN: [id number]  Referring Physician: Referring Practitioner: Pedro Zambrano      Evaluation Date: 2021      Medical Diagnosis Information:  Diagnosis: M89.9, M94.9 R osteochondral lesion of talar dome         Treatment Diagnosis: R ankle pain M25.571                                    Insurance information:        Precautions/ Contra-indications: HTN, OA  Latex Allergy:  [x]NO      []YES  Preferred Language for Healthcare:   [x]English       []other:    SUBJECTIVE: Patient stated complaint: Pt reports he has been trying to rehab his R foot/ankle pain for ~ 1 year. He has had several opinions, EMG, and MRIs to figure out if his foot pain is coming from his previous back surgery. MRI shows osteochondral defect.     Relevant Medical History: see media    Height Weight  Easing factors: rest, arch supports  Provocative factors:  down steps > up steps    Type: []Constant   []Intermittent  []Radiating []Localized []other:     Paresthesias: denies  Occupation/School:sales    Living Status/Prior Level of Function: Independent with ADLs and IADLs      OBJECTIVE:      Initial Initial Current   VAS 2     LEFS 41%     ROM LEFT RIGHT    HIP Flex      HIP Abd      HIP add      HIP Ext      HIP IR      HIP ER      Knee ext      Knee Flex      Ankle DF 5 5    Functional DF 5.5 cm 5.5 cm          Ankle Ev      Strength  LEFT RIGHT    HIP Flexors      HIP Abductors      HIP add      HIP Ext      Hip ER      Hip IR      Knee EXT (quad)      Knee Flex (HS)      Ankle DF  5    Ankle PF  2in from ground SL    Ankle Inv  4+    Ankle EV  4+          Circumference  MP  SP      LE Dermatomes      LE myotomes          Flexibility L R Comment   Hamstring      Gastroc        ITB       Quad      Hip flexor       Glut KTC       KTOS       Piriformis ig 4       Silver             Reflexes/Sensation:    []Dermatomes/Myotomes intact    []Reflexes equal and normal bilaterally   []Other:    Joint mobility: dnd   []Normal    []Hypo   []Hyper    Palpation: no tenderness    Functional Mobility/Transfers: intermittent \"buckling\" and \"giving way\" with up/down stairs; SL HR ~2 in from ground; unable to activate toes 1-5 individually in flex or ext    Posture: WNL    Bandages/Dressings/Incisions: NA    Gait: (include devices/WB status) pes planus B; consistent ankle strategies needed for SLS    Orthopedic Special Tests: dnd  HIP KNEE ANKLE   Test Result Test Result Test Result   Scour  Lachman's  Anterior Drawer    Log Roll  MCMurrays's  Talar Tilt    MITCH  Valgus   Squeeze Test    FADIR  Varus      Olga's  Posterior Drawer        Patellar Apprehension                               [x] Patient history, allergies, meds reviewed. Medical chart reviewed. See intake form. Review Of Systems (ROS):  [x]Performed Review of systems (Integumentary, CardioPulmonary, Neurological) by intake and observation. Intake form has been scanned into medical record. Patient has been instructed to contact their primary care physician regarding ROS issues if not already being addressed at this time.       Co-morbidities/Complexities (which will affect course of rehabilitation):   []None           Arthritic conditions   []Rheumatoid arthritis (M05.9)  [x]Osteoarthritis (M19.91)   Cardiovascular conditions   [x]Hypertension (I10)  []Hyperlipidemia (E78.5)  []Angina pectoris (I20)  []Atherosclerosis (I70)   Musculoskeletal conditions   []Disc pathology   []Congenital spine pathologies   []Prior surgical intervention  []Osteoporosis (M81.8)  []Osteopenia (M85.8)   Endocrine conditions   []Hypothyroid (E03.9)  []Hyperthyroid Gastrointestinal conditions   []Constipation (V78.14)   Metabolic conditions   []Morbid obesity (E66.01)  []Diabetes type 1(E10.65) or 2 (E11.65)   []Neuropathy (G60.9)     Pulmonary conditions   []Asthma (J45)  []Coughing   []COPD (J44.9)   Psychological Disorders  []Anxiety (F41.9)  []Depression (F32.9)   []Other:   []Other:          Barriers to/and or personal factors that will affect rehab potential:              []Age  []Sex              []Motivation/Lack of Motivation                        []Co-Morbidities              []Cognitive Function, education/learning barriers              []Environmental, home barriers              []profession/work barriers  []past PT/medical experience  []other:  Justification:  Pt will do well with PT; understands surgical option if conservative treatment does not manage pain      Falls Risk Assessment (30 days):   [] Falls Risk assessed and no intervention required. [] Falls Risk assessed and Patient requires intervention due to being higher risk   TUG score (>12s at risk):     [] Falls education provided. G-Codes:        ASSESSMENT: Pt is a 45 year male with a 1 year h/o R foot pain diagnosed with osteochondral lesion. He presents with good ROM, but intrinsic deficit contributing to pes planus, thus aggravation tc joint and lesion. He will benefit from PT from PT to address these issues and return to PLOF.    Functional Impairments:     []Noted lumbar/proximal hip/LE joint hypomobility   []Decreased LE functional ROM   []Decreased core/proximal hip strength and neuromuscular control   [x]Decreased LE functional strength   [x]Reduced balance/proprioceptive control   []other:      Functional Activity Limitations (from functional questionnaire and intake)   []Reduced ability to tolerate prolonged functional positions   []Reduced ability or difficulty with changes of positions or transfers between positions   []Reduced ability to maintain good posture and demonstrate good body mechanics with sitting, bending, and lifting   []Reduced ability to sleep   [] Reduced ability or tolerance with driving and/or computer work   []Reduced ability to perform lifting, carrying tasks   []Reduced ability to squat   []Reduced ability to forward bend   [x]Reduced ability to ambulate prolonged functional periods/distances/surfaces   [x]Reduced ability to ascend/descend stairs   [x]Reduced ability to run, hop, cut or jump   []other:    Participation Restrictions   []Reduced participation in self care activities   [x]Reduced participation in home management activities   []Reduced participation in work activities   [x]Reduced participation in social activities. []Reduced participation in sport/recreation activities. Classification :    []Signs/symptoms consistent with post-surgical status including decreased ROM, strength and function.    []Signs/symptoms consistent with joint sprain/strain   []Signs/symptoms consistent with patella-femoral syndrome   []Signs/symptoms consistent with knee OA/hip OA   []Signs/symptoms consistent with internal derangement of knee/Hip   []Signs/symptoms consistent with functional hip weakness/NMR control      []Signs/symptoms consistent with tendinitis/tendinosis    []signs/symptoms consistent with pathology which may benefit from Dry needling      [x]other:  Osteochondral lesion talar dome  Prognosis/Rehab Potential: []Excellent   [x]Good    []Fair   []Poor    Tolerance of evaluation/treatment:    []Excellent   [x]Good    []Fair   []Poor    Physical Therapy Evaluation Complexity Justification  [x] A history of present problem with:  [] no personal factors and/or comorbidities that impact the plan of care;  [x]1-2 personal factors and/or comorbidities that impact the plan of care  []3 personal factors and/or comorbidities that impact the plan of care  [x] An examination of body systems using standardized tests and measures addressing any of the following: body structures and functions (impairments), activity limitations, and/or participation restrictions;:  [x] a total of 1-2 or more elements   [] a total of 3 or more elements   [] a total of 4 or more elements   [x] A clinical presentation with:  [x] stable and/or uncomplicated characteristics   [] evolving clinical presentation with changing characteristics  [] unstable and unpredictable characteristics;   [x] Clinical decision making of [x] low, [] moderate, [] high complexity using standardized patient assessment instrument and/or measurable assessment of functional outcome. [x] EVAL (LOW) 36645 (typically 20 minutes face-to-face)  [] EVAL (MOD) 33862 (typically 30 minutes face-to-face)  [] EVAL (HIGH) 61334 (typically 45 minutes face-to-face)  [] RE-EVAL     PLAN  Frequency/Duration:  1 days per week for 6 Weeks:  Interventions:  [x]  Therapeutic exercise including: strength training, ROM, for Lower extremity and core   [x]  NMR activation and proprioception for LE, Glutes and Core   [x]  Manual therapy as indicated for LE, Hip and spine to include: Dry Needling/IASTM, STM, PROM, Gr I-IV mobilizations, manipulation. [x] Modalities as needed that may include: thermal agents, E-stim, Biofeedback, US, iontophoresis as indicated  [x] Patient education on joint protection, postural re-education, activity modification, progression of HEP.     HEP instruction: Reviewed HEP and pt given handout. GOALS:  Patient stated goal:  Strengthen to prevent pain  [] Progressing: [] Met: [x] Not Met: [] Adjusted    Therapist goals for Patient:   Short Term Goals: To be achieved in: 2 weeks  1. Independent in HEP and progression per patient tolerance, in order to prevent re-injury. [] Progressing: [] Met: [x] Not Met: [] Adjusted  2. Patient will have a decrease in pain to facilitate improvement in movement, function, and ADLs as indicated by Functional Deficits. [] Progressing: [] Met: [x] Not Met: [] Adjusted      Long Term Goals: To be achieved in: 6 weeks  1. Disability index score of 30% or less for the LEFS to assist with reaching prior level of function. [] Progressing: [] Met: [x] Not Met: [] Adjusted  2. Patient will demonstrate an increase in Strength to good proximal hip and core activation to allow for proper functional mobility as indicated by patients Functional Deficits. [] Progressing: [] Met: [x] Not Met: [] Adjusted  3. Patient will return to stair functional activities without increased symptoms or restriction. [] Progressing: [] Met: [x] Not Met: [] Adjusted  4. Pt will return to golf.    [] Progressing: [] Met: [x] Not Met: [] Adjusted     Electronically signed by:  Keren Lucas, PT , DPT, CDNT

## 2021-03-15 ENCOUNTER — HOSPITAL ENCOUNTER (OUTPATIENT)
Dept: PHYSICAL THERAPY | Age: 51
Setting detail: THERAPIES SERIES
Discharge: HOME | End: 2021-03-15
Payer: COMMERCIAL

## 2021-03-15 PROCEDURE — 97112 NEUROMUSCULAR REEDUCATION: CPT | Performed by: PHYSICAL THERAPIST

## 2021-03-15 PROCEDURE — 97140 MANUAL THERAPY 1/> REGIONS: CPT | Performed by: PHYSICAL THERAPIST

## 2021-03-15 PROCEDURE — 97110 THERAPEUTIC EXERCISES: CPT | Performed by: PHYSICAL THERAPIST

## 2021-03-15 NOTE — FLOWSHEET NOTE
[facility],  [address]  Phone: [phone]  Fax [phone]    Physical Therapy Treatment Note/ Progress Report:           Date:  3/15/2021    Patient Name:  Lian Foster    :  1970  MRN: [id number]  Restrictions/Precautions:    Medical/Treatment Diagnosis Information:  · Diagnosis: M89.9, 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  ·  Treatment Diagnosis: R ankle pain K29.604  Insurance/Certification information:     Physician Information:  Referring Practitioner: Ashish Bo  Has the plan of care been signed (Y/N):        []  Yes  [x]  No       Is this a Progress Report:     []  Yes  [x]  No        If Yes:  Date Range for reporting period:  Beginning 2021  Ending    Progress report will be due (10 Rx or 30 days whichever is less): 6210       Recertification will be due (POC Duration  / 90 days whichever is less): 2021      Visit # Insurance Allowable Auth Required   In-person 2  []  Yes []  No    Telehealth   []  Yes []  No    Total             Initial Initial Current   VAS 2       LEFS 41%       ROM LEFT RIGHT     Ankle DF 5 5     Functional DF 5.5 cm 5.5 cm               Ankle Ev         Strength  LEFT RIGHT     Ankle DF   5      Ankle PF   SL HR 2 in from ground      Ankle Inv   4+      Ankle EV   4+          Number of Comorbidities:  []0     [x]1-2    []3+    Latex Allergy:  [x]NO      []YES  Preferred Language for Healthcare:   [x]English       []other:    SUBJECTIVE:  Pt reports it is about the same.     OBJECTIVE: See eval   Observation:    Test measurements:      RESTRICTIONS/PRECAUTIONS:  HTN, OA    Exercises/Interventions:     Therapeutic Ex (84658) Sets/sec Reps Notes/CUES   Pt ed: anatomy with foot/ankle model use/intrinsic firing, activity mod 8 min                                         Heel raise on decline 2x10  Mod range   KDL with table, without table x10 each Treatments:  PROM / STM / Oscillations-Mobs:  G-I, II, III, IV (PA's, Inf., Post.)  [] (63447) Provided manual therapy to mobilize LE, proximal hip and/or LS spine soft tissue/joints for the purpose of modulating pain, promoting relaxation,  increasing ROM, reducing/eliminating soft tissue swelling/inflammation/restriction, improving soft tissue extensibility and allowing for proper ROM for normal function with self care, mobility, lifting and ambulation. Modalities:     [] GAME READY (VASO)- for significant edema, swelling, pain control. Charges:  Timed Code Treatment Minutes: 40   Total Treatment Minutes: 40     BWC time in/time out:   (and requires time in and out for each CPT code)    [x] EVAL (LOW) 38211   [] EVAL (MOD) 60234  [] EVAL (HIGH) 68893   [] RE-EVAL     [x] DR(45294) x   1  [] IONTO  [x] NMR (18348) x  1   [] VASO  [x] Manual (27834) x  1    [] Other:  [] TA x      [] Mech Traction (83791)  [] ES(attended) (31256)      [] ES (un) (49313):       GOALS:     Long Term Goals: To be achieved in: 6 weeks  1. Disability index score of 30% or less for the LEFS to assist with reaching prior level of function. [] Progressing: [] Met: [x] Not Met: [] Adjusted  2. Patient will demonstrate an increase in Strength to good proximal hip and core activation to allow for proper functional mobility as indicated by patients Functional Deficits. [] Progressing: [] Met: [x] Not Met: [] Adjusted  3. Patient will return to stair functional activities without increased symptoms or restriction. [] Progressing: [] Met: [x] Not Met: [] Adjusted  4. Pt will return to golf. [] Progressing: [] Met: [x] Not Met: [] Adjusted     Progression Towards Functional goals:  [] Patient is progressing as expected towards functional goals listed. [] Progression is slowed due to complexities listed. [] Progression has been slowed due to co-morbidities.   [x] Plan just implemented, too soon to assess goals progression  [] Other:         Overall Progression Towards Functional goals/ Treatment Progress Update:  [] Patient is progressing as expected towards functional goals listed. [] Progression is slowed due to complexities/Impairments listed. [] Progression has been slowed due to co-morbidities. [x] Plan just implemented, too soon to assess goals progression <30days   [] Goals require adjustment due to lack of progress  [] Patient is not progressing as expected and requires additional follow up with physician  [] Other    Prognosis for POC: [x] Good [] Fair  [] Poor      Patient requires continued skilled intervention: [x] Yes  [] No    Treatment/Activity Tolerance:  [x] Patient able to complete treatment  [] Patient limited by fatigue  [] Patient limited by pain    [] Patient limited by other medical complications  [] Other:     ASSESSMENT: Tolerated session well. Discussed importance of maintaining ROM DF and PF, but not pushing to limit aggravation of defect. Return to Play: (if applicable)   []  Stage 1: Intro to Strength   []  Stage 2: Return to Run and Strength   []  Stage 3: Return to Jump and Strength   []  Stage 4: Dynamic Strength and Agility   []  Stage 5: Sport Specific Training     []  Ready to Return to Play, Meets All Above Stages   []  Not Ready for Return to Sports   Comments:                               PLAN: See eval  [] Continue per plan of care [] Alter current plan (see comments above)  [x] Plan of care initiated [] Hold pending MD visit [] Discharge      Electronically signed by:  Pauline Camarillo, PT , DPT, CDNT    Note: If patient does not return for scheduled/ recommended follow up visits, this note will serve as a discharge from care along with most recent update on progress.

## 2021-03-29 ENCOUNTER — HOSPITAL ENCOUNTER (OUTPATIENT)
Dept: PHYSICAL THERAPY | Age: 51
Setting detail: THERAPIES SERIES
Discharge: HOME | End: 2021-03-29
Payer: COMMERCIAL

## 2021-03-29 PROCEDURE — 97140 MANUAL THERAPY 1/> REGIONS: CPT | Performed by: PHYSICAL THERAPIST

## 2021-03-29 PROCEDURE — 97110 THERAPEUTIC EXERCISES: CPT | Performed by: PHYSICAL THERAPIST

## 2021-03-29 PROCEDURE — 97112 NEUROMUSCULAR REEDUCATION: CPT | Performed by: PHYSICAL THERAPIST

## 2021-03-29 NOTE — FLOWSHEET NOTE
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  ·  Treatment Diagnosis: R ankle pain I29.972  Insurance/Certification information:     Physician Information:  Referring Practitioner: Jose Manuel Newman  Has the plan of care been signed (Y/N):        []  Yes  [x]  No       Is this a Progress Report:     []  Yes  [x]  No        If Yes:  Date Range for reporting period:  Beginning 2021  Ending 3/29/2021    Progress report will be due (10 Rx or 30 days whichever is less): 3/57/7342       Recertification will be due (POC Duration  / 90 days whichever is less): 2021      Visit # Insurance Allowable Auth Required   In-person 3  []  Yes []  No    Telehealth   []  Yes []  No    Total             Initial Initial Current   VAS 2       LEFS 41%       ROM LEFT RIGHT     Ankle DF 5 5     Functional DF 5.5 cm 5.5 cm               Ankle Ev         Strength  LEFT RIGHT     Ankle DF   5      Ankle PF   SL HR 2 in from ground      Ankle Inv   4+      Ankle EV   4+          Number of Comorbidities:  []0     [x]1-2    []3+    Latex Allergy:  [x]NO      []YES  Preferred Language for Healthcare:   [x]English       []other:    SUBJECTIVE:  Pt reports it is about the same, but less intense than when he started. Noticing arch soreness from trying to stretch the big toe.     OBJECTIVE:    Observation:    Test measurements:      RESTRICTIONS/PRECAUTIONS:  HTN, OA    Exercises/Interventions:     Therapeutic Ex (96000) Sets/sec Reps Notes/CUES   Pt ed: anatomy with foot/ankle model use/intrinsic firing, activity mod 8 min Heel raise on decline   Mod range   KDL with table, without table      gastroc KDL 2x10                 Manual Intervention (93490)      STW plantar arch   IASTM   hypervolt gastroc/soleus, ant tib, post tib, peroneals, medial arch 10 min                             NMR re-education (09802)   CUES NEEDED   Foot tripod x     Toe yoga :3x8 great toe ext     SLS A-P reach reviewed     RWX L-M reach reviewed     Bosu lateral step  bosu FSU alt 2x10  x10 each     D.D FSU to contralateral TT   D.D LSU to contralateral TT forward x10 alt  x10 R  6 in step  6 in step                           Therapeutic Activity (10413)                                                Therapeutic Exercise and NMR EXR  [] (20538) Provided verbal/tactile cueing for activities related to strengthening, flexibility, endurance, ROM for improvements in LE, proximal hip, and core control with self care, mobility, lifting, ambulation.  [] (30453) Provided verbal/tactile cueing for activities related to improving balance, coordination, kinesthetic sense, posture, motor skill, proprioception  to assist with LE, proximal hip, and core control in self care, mobility, lifting, ambulation and eccentric single leg control.      NMR and Therapeutic Activities:    [] (68892 or 90774) Provided verbal/tactile cueing for activities related to improving balance, coordination, kinesthetic sense, posture, motor skill, proprioception and motor activation to allow for proper function of core, proximal hip and LE with self care and ADLs  [] (81611) Gait Re-education- Provided training and instruction to the patient for proper LE, core and proximal hip recruitment and positioning and eccentric body weight control with ambulation re-education including up and down stairs     Home Exercise Program:    [x] (88989) Reviewed/Progressed HEP activities related to strengthening, flexibility, endurance, ROM of core, proximal hip and LE for functional self-care, mobility, lifting and ambulation/stair navigation   [] (80341)Reviewed/Progressed HEP activities related to improving balance, coordination, kinesthetic sense, posture, motor skill, proprioception of core, proximal hip and LE for self care, mobility, lifting, and ambulation/stair navigation      Manual Treatments:  PROM / STM / Oscillations-Mobs:  G-I, II, III, IV (PA's, Inf., Post.)  [] (26968) Provided manual therapy to mobilize LE, proximal hip and/or LS spine soft tissue/joints for the purpose of modulating pain, promoting relaxation,  increasing ROM, reducing/eliminating soft tissue swelling/inflammation/restriction, improving soft tissue extensibility and allowing for proper ROM for normal function with self care, mobility, lifting and ambulation. Modalities:     [] GAME READY (VASO)- for significant edema, swelling, pain control. Charges:  Timed Code Treatment Minutes: 40   Total Treatment Minutes: 40     BWC time in/time out:   (and requires time in and out for each CPT code)    [x] EVAL (LOW) 65210   [] EVAL (MOD) 77994  [] EVAL (HIGH) 68274   [] RE-EVAL     [x] YJ(87081) x   1  [] IONTO  [x] NMR (04459) x  1   [] VASO  [x] Manual (66324) x  1    [] Other:  [] TA x      [] Mech Traction (41594)  [] ES(attended) (35226)      [] ES (un) (98225):       GOALS:     Long Term Goals: To be achieved in: 6 weeks  1. Disability index score of 30% or less for the LEFS to assist with reaching prior level of function. [] Progressing: [] Met: [x] Not Met: [] Adjusted  2. Patient will demonstrate an increase in Strength to good proximal hip and core activation to allow for proper functional mobility as indicated by patients Functional Deficits. [] Progressing: [] Met: [x] Not Met: [] Adjusted  3. Patient will return to stair functional activities without increased symptoms or restriction. [] Progressing: [] Met: [x] Not Met: [] Adjusted  4. Pt will return to golf.    [] Progressing: [] Met: [x] Not Met: [] recommended follow up visits, this note will serve as a discharge from care along with most recent update on progress.

## 2021-04-08 ENCOUNTER — HOSPITAL ENCOUNTER (OUTPATIENT)
Dept: PHYSICAL THERAPY | Age: 51
Setting detail: THERAPIES SERIES
Discharge: HOME | End: 2021-04-08
Payer: COMMERCIAL

## 2021-04-08 PROCEDURE — 97110 THERAPEUTIC EXERCISES: CPT | Performed by: PHYSICAL THERAPIST

## 2021-04-08 PROCEDURE — 97140 MANUAL THERAPY 1/> REGIONS: CPT | Performed by: PHYSICAL THERAPIST

## 2021-04-08 PROCEDURE — 97112 NEUROMUSCULAR REEDUCATION: CPT | Performed by: PHYSICAL THERAPIST

## 2021-04-08 NOTE — FLOWSHEET NOTE
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  ·  Treatment Diagnosis: R ankle pain Q24.703  Insurance/Certification information:     Physician Information:  Referring Practitioner: Dasia Hernandez  Has the plan of care been signed (Y/N):        []  Yes  [x]  No       Is this a Progress Report:     []  Yes  [x]  No        If Yes:  Date Range for reporting period:  Beginning 2021  Ending 3/29/2021    Progress report will be due (10 Rx or 30 days whichever is less):        Recertification will be due (POC Duration  / 90 days whichever is less): 2021      Visit # Insurance Allowable Auth Required   In-person 4  []  Yes []  No    Telehealth   []  Yes []  No    Total             Initial Initial Current   VAS 2       LEFS 41%       ROM LEFT RIGHT     Ankle DF 5 5     Functional DF 5.5 cm 5.5 cm               Ankle Ev         Strength  LEFT RIGHT     Ankle DF   5      Ankle PF   SL HR 2 in from ground      Ankle Inv   4+      Ankle EV   4+          Number of Comorbidities:  []0     [x]1-2    []3+    Latex Allergy:  [x]NO      []YES  Preferred Language for Healthcare:   [x]English       []other:    SUBJECTIVE:  Pt reports he didn't do much exercise over the weekend and is noticing more arch soreness and front ankle soreness.     OBJECTIVE:    Observation:    Test measurements:      RESTRICTIONS/PRECAUTIONS:  HTN, OA    Exercises/Interventions:     Therapeutic Ex (01719) Sets/sec Reps Notes/CUES   Pt ed: anatomy with foot/ankle model use/intrinsic firing, activity mod                        Great toe wedge stretch :10x4 self-care, mobility, lifting and ambulation/stair navigation   [] (88760)Reviewed/Progressed HEP activities related to improving balance, coordination, kinesthetic sense, posture, motor skill, proprioception of core, proximal hip and LE for self care, mobility, lifting, and ambulation/stair navigation      Manual Treatments:  PROM / STM / Oscillations-Mobs:  G-I, II, III, IV (PA's, Inf., Post.)  [] (74801) Provided manual therapy to mobilize LE, proximal hip and/or LS spine soft tissue/joints for the purpose of modulating pain, promoting relaxation,  increasing ROM, reducing/eliminating soft tissue swelling/inflammation/restriction, improving soft tissue extensibility and allowing for proper ROM for normal function with self care, mobility, lifting and ambulation. Modalities:     [] GAME READY (VASO)- for significant edema, swelling, pain control. Charges:  Timed Code Treatment Minutes: 42   Total Treatment Minutes: 42     BWC time in/time out:   (and requires time in and out for each CPT code)    [x] EVAL (LOW) 41202   [] EVAL (MOD) 33243  [] EVAL (HIGH) 01686   [] RE-EVAL     [x] BA(10629) x   1  [] IONTO  [x] NMR (28872) x  1   [] VASO  [x] Manual (21249) x  1    [] Other:  [] TA x      [] Mech Traction (87657)  [] ES(attended) (93676)      [] ES (un) (91492):       GOALS:     Long Term Goals: To be achieved in: 6 weeks  1. Disability index score of 30% or less for the LEFS to assist with reaching prior level of function. [] Progressing: [] Met: [x] Not Met: [] Adjusted  2. Patient will demonstrate an increase in Strength to good proximal hip and core activation to allow for proper functional mobility as indicated by patients Functional Deficits. [] Progressing: [] Met: [x] Not Met: [] Adjusted  3. Patient will return to stair functional activities without increased symptoms or restriction. [] Progressing: [] Met: [x] Not Met: [] Adjusted  4. Pt will return to golf.    [] Progressing: [] Met: [x] Not most recent update on progress.

## 2021-04-15 ENCOUNTER — HOSPITAL ENCOUNTER (OUTPATIENT)
Dept: PHYSICAL THERAPY | Age: 51
Setting detail: THERAPIES SERIES
Discharge: HOME | End: 2021-04-15
Payer: COMMERCIAL

## 2021-04-15 PROCEDURE — 97140 MANUAL THERAPY 1/> REGIONS: CPT | Performed by: PHYSICAL THERAPIST

## 2021-04-15 PROCEDURE — 97110 THERAPEUTIC EXERCISES: CPT | Performed by: PHYSICAL THERAPIST

## 2021-04-15 PROCEDURE — 97112 NEUROMUSCULAR REEDUCATION: CPT | Performed by: PHYSICAL THERAPIST

## 2021-04-15 NOTE — FLOWSHEET NOTE
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  ·  Treatment Diagnosis: R ankle pain L39.306  Insurance/Certification information:     Physician Information:  Referring Practitioner: Krys Magdaleno  Has the plan of care been signed (Y/N):        []  Yes  [x]  No       Is this a Progress Report:     []  Yes  [x]  No        If Yes:  Date Range for reporting period:  Beginning 2021  Ending 3/29/2021    Progress report will be due (10 Rx or 30 days whichever is less):        Recertification will be due (POC Duration  / 90 days whichever is less): 2021      Visit # Insurance Allowable Auth Required   In-person 5  []  Yes []  No    Telehealth   []  Yes []  No    Total             Initial Initial Current   VAS 2       LEFS 41%       ROM LEFT RIGHT     Ankle DF 5 5     Functional DF 5.5 cm 5.5 cm               Ankle Ev         Strength  LEFT RIGHT     Ankle DF   5      Ankle PF   SL HR 2 in from ground      Ankle Inv   4+      Ankle EV   4+          Number of Comorbidities:  []0     [x]1-2    []3+    Latex Allergy:  [x]NO      []YES  Preferred Language for Healthcare:   [x]English       []other:    SUBJECTIVE:  Pt reports he is feeling better since he is keeping up on his exercises.     OBJECTIVE:    Observation:    Test measurements:      RESTRICTIONS/PRECAUTIONS:  HTN, OA    Exercises/Interventions:     Therapeutic Ex (15216) Sets/sec Reps Notes/CUES   Pt ed: anatomy with foot/ankle model use/intrinsic firing, activity mod                        Great toe wedge stretch                  Heel raise on decline   Mod range   KDL with table, without table      gastroc KDL 2x10     4 in step post reach 2x10  Minimal knee flexion to limit knee pain         Manual Intervention (94846)      STW plantar arch   IASTM   hypervolt gastroc/soleus, ant tib, post tib, peroneals, medial arch 8 min     KT for medial arch support, ant tib fac                        NMR re-education (62117)   CUES NEEDED   Foot tripod      Toe yoga      SLS A-P reach reviewed     RWX L-M reach reviewed     Bosu ATW dndx2 R/L      Bosu FSU to contralateral TT TM   Bosu LSU to contralateral TT TM X10, x10 5#  x10 R, x10 5#           OYX, RWX, LXX chest pass rebounder 2x10 each     airex R tandem stance chest pass 2x10            B XXX 1/2 foam roll MB chop/lift x10 each     B 1/2 roll R tandem MB chop/lift x10 each                                         Therapeutic Exercise and NMR EXR  [] (46291) Provided verbal/tactile cueing for activities related to strengthening, flexibility, endurance, ROM for improvements in LE, proximal hip, and core control with self care, mobility, lifting, ambulation.  [] (29534) Provided verbal/tactile cueing for activities related to improving balance, coordination, kinesthetic sense, posture, motor skill, proprioception  to assist with LE, proximal hip, and core control in self care, mobility, lifting, ambulation and eccentric single leg control.      NMR and Therapeutic Activities:    [] (36664 or 52744) Provided verbal/tactile cueing for activities related to improving balance, coordination, kinesthetic sense, posture, motor skill, proprioception and motor activation to allow for proper function of core, proximal hip and LE with self care and ADLs  [] (45653) Gait Re-education- Provided training and instruction to the patient for proper LE, core and proximal hip recruitment and positioning and eccentric body weight control with ambulation re-education including up and down stairs     Home Exercise Program:    [x] (32164) Reviewed/Progressed HEP activities related to strengthening, flexibility, endurance, ROM of core, proximal hip and LE for functional self-care, mobility, lifting and ambulation/stair navigation   [] (01386)Reviewed/Progressed HEP activities related to improving balance, coordination, kinesthetic sense, posture, motor skill, proprioception of core, proximal hip and LE for self care, mobility, lifting, and ambulation/stair navigation      Manual Treatments:  PROM / STM / Oscillations-Mobs:  G-I, II, III, IV (PA's, Inf., Post.)  [] (36612) Provided manual therapy to mobilize LE, proximal hip and/or LS spine soft tissue/joints for the purpose of modulating pain, promoting relaxation,  increasing ROM, reducing/eliminating soft tissue swelling/inflammation/restriction, improving soft tissue extensibility and allowing for proper ROM for normal function with self care, mobility, lifting and ambulation. Modalities:     [] GAME READY (VASO)- for significant edema, swelling, pain control. Charges:  Timed Code Treatment Minutes: 42   Total Treatment Minutes: 42     BWC time in/time out:   (and requires time in and out for each CPT code)    [x] EVAL (LOW) 49782   [] EVAL (MOD) 97886  [] EVAL (HIGH) 64808   [] RE-EVAL     [x] MI(87855) x   1  [] IONTO  [x] NMR (75150) x  1   [] VASO  [x] Manual (10411) x  1    [] Other:  [] TA x      [] Mech Traction (46761)  [] ES(attended) (06048)      [] ES (un) (26099):       GOALS:     Long Term Goals: To be achieved in: 6 weeks  1. Disability index score of 30% or less for the LEFS to assist with reaching prior level of function. [] Progressing: [] Met: [x] Not Met: [] Adjusted  2. Patient will demonstrate an increase in Strength to good proximal hip and core activation to allow for proper functional mobility as indicated by patients Functional Deficits. [] Progressing: [] Met: [x] Not Met: [] Adjusted  3. Patient will return to stair functional activities without increased symptoms or restriction. patient does not return for scheduled/ recommended follow up visits, this note will serve as a discharge from care along with most recent update on progress.

## 2021-04-29 ENCOUNTER — HOSPITAL ENCOUNTER (OUTPATIENT)
Dept: PHYSICAL THERAPY | Age: 51
Setting detail: THERAPIES SERIES
Discharge: HOME | End: 2021-04-29
Payer: COMMERCIAL

## 2021-04-29 PROCEDURE — 97112 NEUROMUSCULAR REEDUCATION: CPT | Performed by: PHYSICAL THERAPIST

## 2021-04-29 PROCEDURE — 97110 THERAPEUTIC EXERCISES: CPT | Performed by: PHYSICAL THERAPIST

## 2021-04-29 PROCEDURE — 97140 MANUAL THERAPY 1/> REGIONS: CPT | Performed by: PHYSICAL THERAPIST

## 2021-04-29 NOTE — PLAN OF CARE
The [address] [facility],[address], [address] [facility], [address], [place]          To:   Dr Tommy Thompson     Patient: Ajay Peacock   : 1970  VAQ:[id number]  Evaluation Date: 2021      Diagnosis Information:  ·   Diagnosis: M89.9, 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  ·  Treatment Diagnosis: R ankle pain M25.571    Physical Therapy Certification/Re-Certification Form  Dear Dr. Stout,  The following patient has been evaluated for physical therapy services and for therapy to continue, Medicare requires monthly physician review of the treatment plan. Please review the attached evaluation and/or summary of the patient's plan of care, and verify that you agree therapy should continue by signing the attached document and sending it back to our office. Plan of Care/Treatment to date:  [x] Therapeutic Exercise    [] Modalities:  [] Therapeutic Activity     [] Ultrasound  [] Electric Stimulation  [x] Gait Training      [] Cervical Traction [] Lumbar Traction  [x] Neuromuscular Re-education    [] Cold/hotpack [] Iontophoresis   [x] Instruction in HEP     Other:  [x] Manual Therapy      []             [] Aquatic Therapy      []           ? Frequency/Duration:  # Days per week: [x] 1 day # Weeks: [] 1 week [] 7 weeks     [] 2 days? [] 2 weeks [] 8 weeks     [] 3 days   [] 3 weeks [] 9 weeks     [] 4 days   [] 4 weeks [] 10 weeks      [] 5 days   [] 5 weeks [] 11 weeks          [x] 6 weeks [] 12 weeks      Rehab Potential: [] Excellent [x] Good [] Fair  [] Poor     Electronically signed by:  Rhett Acuña PT    If you have any questions or concerns, please don't hesitate to call.   Thank you for your referral.  Physician Signature:________________________________Date:__________________  By signing above, therapists plan is approved by physician          Physical Therapy Treatment Note/ Progress Report:           Date:  2021    Patient Name:  Ajay Peacock :  1970  MRN: [id number]  Restrictions/Precautions:    Medical/Treatment Diagnosis Information:  · Diagnosis: M89.9, 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  ·  Treatment Diagnosis: R ankle pain E22.768  Insurance/Certification information:     Physician Information:  Referring Practitioner: Jaspal Found  Has the plan of care been signed (Y/N):        []  Yes  [x]  No       Is this a Progress Report:     []  Yes  [x]  No        If Yes:  Date Range for reporting period:  Beginning 2021  Ending 2021    Progress report will be due (10 Rx or 30 days whichever is less): 8467       Recertification will be due (POC Duration  / 90 days whichever is less): 2021      Visit # Insurance Allowable Auth Required   In-person 6  []  Yes []  No    Telehealth   []  Yes []  No    Total             Initial Initial Current   VAS 2       LEFS 41%   49%    ROM LEFT RIGHT     Ankle DF 5 5     Functional DF 5.5 cm 5.5 cm               Ankle Ev         Strength  LEFT RIGHT     Ankle DF   5  5    Ankle PF   SL HR 2 in from ground  x4    Ankle Inv   4+  4+    Ankle EV   4+ 5         Number of Comorbidities:  []0     [x]1-2    []3+    Latex Allergy:  [x]NO      []YES  Preferred Language for Healthcare:   [x]English       []other:    SUBJECTIVE:  Pt reports he is definitely feeling better and stronger.     OBJECTIVE:    Observation:    Test measurements:      RESTRICTIONS/PRECAUTIONS:  HTN, OA    Exercises/Interventions:     Therapeutic Ex (62386) Sets/sec Reps Notes/CUES   Pt ed: anatomy with foot/ankle model use/intrinsic firing, activity mod                        Great toe wedge stretch                  Heel raise on decline   Mod range   KDL with table, without table      gastroc KDL     4 in step post reach  Minimal knee flexion to limit knee pain         Manual Intervention (94887)      STW plantar arch   IASTM   hypervolt gastroc/soleus, ant tib, post tib, peroneals, medial arch 8 min     KT for medial arch support, ant tib fac                        NMR re-education (78603)   CUES NEEDED   Foot tripod      Toe yoga      SLS A-P reach reviewed     RWX L-M reach reviewed     Bosu ATW dndx2 R/L      Bosu FSU to contralateral TT TM   Bosu LSU to contralateral TT TM X10, x10 5#, with foot in PF on bosu x10  x10 R, x10 5#, with foot in PF on bosu x10           RXX, RWX, LXX chest pass rebounder 2x10 each     airex R tandem stance chest pass 2x10            B XXX 1/2 foam roll MB chop/lift x10 each     B 1/2 roll R tandem MB chop/lift x10 each     Tandem stance 1/2 rolls :15x2 each                                   Therapeutic Exercise and NMR EXR  [] (32723) Provided verbal/tactile cueing for activities related to strengthening, flexibility, endurance, ROM for improvements in LE, proximal hip, and core control with self care, mobility, lifting, ambulation.  [] (78893) Provided verbal/tactile cueing for activities related to improving balance, coordination, kinesthetic sense, posture, motor skill, proprioception  to assist with LE, proximal hip, and core control in self care, mobility, lifting, ambulation and eccentric single leg control.      NMR and Therapeutic Activities:    [] (39909 or 26109) Provided verbal/tactile cueing for activities related to improving balance, coordination, kinesthetic sense, posture, motor skill, proprioception and motor activation to allow for proper function of core, proximal hip and LE with self care and ADLs  [] (43616) Gait Re-education- Provided training and instruction to the patient for proper LE, core and proximal hip recruitment and positioning and eccentric body weight control with ambulation re-education including up and down stairs     Home Exercise Program:    [x] (88898) Reviewed/Progressed HEP activities related to strengthening, flexibility, endurance, ROM of core, proximal hip and LE for functional self-care, mobility, lifting and ambulation/stair navigation   [] (32967)Reviewed/Progressed HEP activities related to improving balance, coordination, kinesthetic sense, posture, motor skill, proprioception of core, proximal hip and LE for self care, mobility, lifting, and ambulation/stair navigation      Manual Treatments:  PROM / STM / Oscillations-Mobs:  G-I, II, III, IV (PA's, Inf., Post.)  [] (22453) Provided manual therapy to mobilize LE, proximal hip and/or LS spine soft tissue/joints for the purpose of modulating pain, promoting relaxation,  increasing ROM, reducing/eliminating soft tissue swelling/inflammation/restriction, improving soft tissue extensibility and allowing for proper ROM for normal function with self care, mobility, lifting and ambulation. Modalities:     [] GAME READY (VASO)- for significant edema, swelling, pain control. Charges:  Timed Code Treatment Minutes: 42   Total Treatment Minutes: 42     [facility] time in/time out:   (and requires time in and out for each CPT code)    [x] EVAL (LOW) 08302   [] EVAL (MOD) 85751  [] EVAL (HIGH) 18181   [] RE-EVAL     [x] [place](49626) x   1  [] IONTO  [x] NMR (09564) x  1   [] VASO  [x] Manual (13138) x  1    [] Other:  [] TA x      [] Mech Traction (66768)  [] ES(attended) (44367)      [] ES (un) (83283):       GOALS:     Long Term Goals: To be achieved in: 6 weeks  1. Disability index score of 30% or less for the LEFS to assist with reaching prior level of function. [x] Progressing: [] Met: [] Not Met: [] Adjusted  2. Patient will demonstrate an increase in Strength to good proximal hip and core activation to allow for proper functional mobility as indicated by patients Functional Deficits. [x] Progressing: [] Met: [] Not Met: [] Adjusted  3. Patient will return to stair functional activities without increased symptoms or restriction. [x] Progressing: able to PF almost to normal during stair navigation- continues to be apprehensive about not using HR [] Met: [x] Not Met: [] Adjusted  4.  Pt will return to golf.   [] Progressing: [] Met: [x] Not Met: [] Adjusted     Progression Towards Functional goals:  [x] Patient is progressing as expected towards functional goals listed. [] Progression is slowed due to complexities listed. [] Progression has been slowed due to co-morbidities. [] Plan just implemented, too soon to assess goals progression  [] Other:         Overall Progression Towards Functional goals/ Treatment Progress Update:  [x] Patient is progressing as expected towards functional goals listed. [] Progression is slowed due to complexities/Impairments listed. [] Progression has been slowed due to co-morbidities. [] Plan just implemented, too soon to assess goals progression <30days   [] Goals require adjustment due to lack of progress  [] Patient is not progressing as expected and requires additional follow up with physician  [] Other    Prognosis for POC: [x] Good [] Fair  [] Poor      Patient requires continued skilled intervention: [x] Yes  [] No    Treatment/Activity Tolerance:  [x] Patient able to complete treatment  [] Patient limited by fatigue  [] Patient limited by pain    [] Patient limited by other medical complications  [] Other:     ASSESSMENT: Continues to respond to PT stability work- continue to work into [employer] stability.     Return to Play: (if applicable)   []  Stage 1: Intro to Strength   []  Stage 2: Return to Run and Strength   []  Stage 3: Return to Jump and Strength   []  Stage 4: Dynamic Strength and Agility   []  Stage 5: Sport Specific Training     []  Ready to Return to Play, Meets All Above Stages   []  Not Ready for Return to Sports   Comments:                               PLAN: See eval  [x] Continue per plan of care: 1x/ every other week 6 weeks [] Alter current plan (see comments above)  [] Plan of care initiated [] Hold pending MD visit [] Discharge      Electronically signed by:  Rita Navarro, PT , DPT, CDNT    Note: If patient does not return for scheduled/ recommended follow up visits, this note will serve as a discharge from care along with most recent update on progress.

## 2021-05-10 ENCOUNTER — HOSPITAL ENCOUNTER (OUTPATIENT)
Dept: PHYSICAL THERAPY | Age: 51
Setting detail: THERAPIES SERIES
Discharge: HOME | End: 2021-05-10
Payer: COMMERCIAL

## 2021-05-10 PROCEDURE — 97140 MANUAL THERAPY 1/> REGIONS: CPT | Performed by: PHYSICAL THERAPIST

## 2021-05-10 PROCEDURE — 97110 THERAPEUTIC EXERCISES: CPT | Performed by: PHYSICAL THERAPIST

## 2021-05-10 PROCEDURE — 97112 NEUROMUSCULAR REEDUCATION: CPT | Performed by: PHYSICAL THERAPIST

## 2021-05-10 NOTE — PLAN OF CARE
The [address], [address], [address] [place], [place]             Physical Therapy Treatment Note/ Progress Report:           Date:  5/10/2021    Patient Name:  Rafa Colin    :  1970  MRN: [id number]  Restrictions/Precautions:    Medical/Treatment Diagnosis Information:  · Diagnosis: M89.9, 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  ·  Treatment Diagnosis: R ankle pain R37.586  Insurance/Certification information:     Physician Information:  Referring Practitioner: Jaspal Found  Has the plan of care been signed (Y/N):        []  Yes  [x]  No       Is this a Progress Report:     []  Yes  [x]  No        If Yes:  Date Range for reporting period:  Beginning 2021  Ending 2021    Progress report will be due (10 Rx or 30 days whichever is less):        Recertification will be due (POC Duration  / 90 days whichever is less): 2021      Visit # Insurance Allowable Auth Required   In-person 7  []  Yes []  No    Telehealth   []  Yes []  No    Total             Initial Initial Current   VAS 2       LEFS 41%   49%    ROM LEFT RIGHT     Ankle DF 5 5     Functional DF 5.5 cm 5.5 cm               Ankle Ev         Strength  LEFT RIGHT     Ankle DF   5  5    Ankle PF   SL HR 2 in from ground  x4    Ankle Inv   4+  4+    Ankle EV   4+ 5         Number of Comorbidities:  []0     [x]1-2    []3+    Latex Allergy:  [x]NO      []YES  Preferred Language for Healthcare:   [x]English       []other:    SUBJECTIVE:  Pt reports he forgot his cupping kit while out of town visiting and requests it this visit. He has been walking more and able to go down small steps.     OBJECTIVE:    Observation:    Test measurements:      RESTRICTIONS/PRECAUTIONS:  HTN, OA    Exercises/Interventions:     Therapeutic Ex (93739) Sets/sec Reps Notes/CUES   Pt ed: anatomy with foot/ankle model use/intrinsic firing, activity mod                        Great toe wedge stretch including up and down stairs     Home Exercise Program:    [x] (58817) Reviewed/Progressed HEP activities related to strengthening, flexibility, endurance, ROM of core, proximal hip and LE for functional self-care, mobility, lifting and ambulation/stair navigation   [] (90666)Reviewed/Progressed HEP activities related to improving balance, coordination, kinesthetic sense, posture, motor skill, proprioception of core, proximal hip and LE for self care, mobility, lifting, and ambulation/stair navigation      Manual Treatments:  PROM / STM / Oscillations-Mobs:  G-I, II, III, IV (PA's, Inf., Post.)  [] (20062) Provided manual therapy to mobilize LE, proximal hip and/or LS spine soft tissue/joints for the purpose of modulating pain, promoting relaxation,  increasing ROM, reducing/eliminating soft tissue swelling/inflammation/restriction, improving soft tissue extensibility and allowing for proper ROM for normal function with self care, mobility, lifting and ambulation. Modalities:     [] GAME READY (VASO)- for significant edema, swelling, pain control. Charges:  Timed Code Treatment Minutes: 42   Total Treatment Minutes: 42     [facility] time in/time out:   (and requires time in and out for each CPT code)    [x] EVAL (LOW) 45473   [] EVAL (MOD) 60513  [] EVAL (HIGH) 83454   [] RE-EVAL     [x] HX(89836) x   1  [] IONTO  [x] NMR (75377) x  1   [] VASO  [x] Manual (57961) x  1    [] Other:  [] TA x      [] Mech Traction (60354)  [] ES(attended) (03805)      [] ES (un) (86979):       GOALS:     Long Term Goals: To be achieved in: 6 weeks  1. Disability index score of 30% or less for the LEFS to assist with reaching prior level of function. [x] Progressing: [] Met: [] Not Met: [] Adjusted  2. Patient will demonstrate an increase in Strength to good proximal hip and core activation to allow for proper functional mobility as indicated by patients Functional Deficits. [x] Progressing: [] Met: [] Not Met: [] Adjusted  3. Patient will return to stair functional activities without increased symptoms or restriction. [x] Progressing: able to PF almost to normal during stair navigation- continues to be apprehensive about not using HR [] Met: [x] Not Met: [] Adjusted  4. Pt will return to golf. [] Progressing: [] Met: [x] Not Met: [] Adjusted     Progression Towards Functional goals:  [x] Patient is progressing as expected towards functional goals listed. [] Progression is slowed due to complexities listed. [] Progression has been slowed due to co-morbidities. [] Plan just implemented, too soon to assess goals progression  [] Other:         Overall Progression Towards Functional goals/ Treatment Progress Update:  [x] Patient is progressing as expected towards functional goals listed. [] Progression is slowed due to complexities/Impairments listed. [] Progression has been slowed due to co-morbidities. [] Plan just implemented, too soon to assess goals progression <30days   [] Goals require adjustment due to lack of progress  [] Patient is not progressing as expected and requires additional follow up with physician  [] Other    Prognosis for POC: [x] Good [] Fair  [] Poor      Patient requires continued skilled intervention: [x] Yes  [] No    Treatment/Activity Tolerance:  [x] Patient able to complete treatment  [] Patient limited by fatigue  [] Patient limited by pain    [] Patient limited by other medical complications  [] Other:     ASSESSMENT: Reviewed D.D step patterns, lunging, and kickstand work to promote stability.      Return to Play: (if applicable)   []  Stage 1: Intro to Strength   []  Stage 2: Return to Run and Strength   []  Stage 3: Return to Jump and Strength   []  Stage 4: Dynamic Strength and Agility   []  Stage 5: Sport Specific Training     []  Ready to Return to Play, Meets All Above Stages   []  Not Ready for Return to Sports   Comments:                               PLAN: See emilee  [x] Continue per plan of care: 1x/ every other week 6 weeks [] Alter current plan (see comments above)  [] Plan of care initiated [] Hold pending MD visit [] Discharge      Electronically signed by:  Myranda Chris PT , DPT, CDNT    Note: If patient does not return for scheduled/ recommended follow up visits, this note will serve as a discharge from care along with most recent update on progress.

## 2021-05-17 ENCOUNTER — HOSPITAL ENCOUNTER (OUTPATIENT)
Dept: PHYSICAL THERAPY | Age: 51
Setting detail: THERAPIES SERIES
Discharge: HOME | End: 2021-05-17
Payer: COMMERCIAL

## 2021-05-17 PROCEDURE — 97110 THERAPEUTIC EXERCISES: CPT | Performed by: PHYSICAL THERAPIST

## 2021-05-17 PROCEDURE — 97140 MANUAL THERAPY 1/> REGIONS: CPT | Performed by: PHYSICAL THERAPIST

## 2021-05-17 PROCEDURE — 97112 NEUROMUSCULAR REEDUCATION: CPT | Performed by: PHYSICAL THERAPIST

## 2021-05-17 NOTE — FLOWSHEET NOTE
decline   Mod range   KDL  2x10   with table for balance    gastroc KDL     4 in step post reach  Minimal knee flexion to limit knee pain               Manual Intervention (80127)      STW plantar arch   IASTM   hypervolt gastroc/soleus, ant tib, post tib, peroneals, medial arch      KT for medial arch support, ant tib fac      MFD ant tib x2, post tib x1, lateral gastroc x2 8 min     Plantar fascia and abductor hallucis massage  4 min            NMR re-education (20196)   CUES NEEDED         Bosu ATW x2 R/L  Heels at small [place]   1/2 roll FSU to contralateral TT TM, FSU to lateral TT TM      1/2 roll  LSU to contralateral TT TM   x15 R           RXX, RWX, LXX chest pass rebounder     airex R tandem stance chest pass           Bosu 1/2 foam roll MB chop/lift x10 each     B 1/2 roll R tandem MB chop/lift x10 each     Tandem stance 1/2 rolls            Ant lunge iso D.D :20x3     D. D kickstand hip hinge x8                 Therapeutic Exercise and NMR EXR  [] (18775) Provided verbal/tactile cueing for activities related to strengthening, flexibility, endurance, ROM for improvements in LE, proximal hip, and core control with self care, mobility, lifting, ambulation.  [] (77112) Provided verbal/tactile cueing for activities related to improving balance, coordination, kinesthetic sense, posture, motor skill, proprioception  to assist with LE, proximal hip, and core control in self care, mobility, lifting, ambulation and eccentric single leg control.      NMR and Therapeutic Activities:    [] (49103 or 69478) Provided verbal/tactile cueing for activities related to improving balance, coordination, kinesthetic sense, posture, motor skill, proprioception and motor activation to allow for proper function of core, proximal hip and LE with self care and ADLs  [] (59838) Gait Re-education- Provided training and instruction to the patient for proper LE, core and proximal hip recruitment and positioning and eccentric body weight control with ambulation re-education including up and down stairs     Home Exercise Program:    [x] (77963) Reviewed/Progressed HEP activities related to strengthening, flexibility, endurance, ROM of core, proximal hip and LE for functional self-care, mobility, lifting and ambulation/stair navigation   [] (64216)Reviewed/Progressed HEP activities related to improving balance, coordination, kinesthetic sense, posture, motor skill, proprioception of core, proximal hip and LE for self care, mobility, lifting, and ambulation/stair navigation      Manual Treatments:  PROM / STM / Oscillations-Mobs:  G-I, II, III, IV (PA's, Inf., Post.)  [] (27996) Provided manual therapy to mobilize LE, proximal hip and/or LS spine soft tissue/joints for the purpose of modulating pain, promoting relaxation,  increasing ROM, reducing/eliminating soft tissue swelling/inflammation/restriction, improving soft tissue extensibility and allowing for proper ROM for normal function with self care, mobility, lifting and ambulation. Modalities:     [] GAME READY (VASO)- for significant edema, swelling, pain control. Charges:  Timed Code Treatment Minutes: 42   Total Treatment Minutes: 42     [facility] time in/time out:   (and requires time in and out for each CPT code)    [x] EVAL (LOW) 19237   [] EVAL (MOD) 02277  [] EVAL (HIGH) 63471   [] RE-EVAL     [x] JN(32810) x   1  [] IONTO  [x] NMR (48092) x  1   [] VASO  [x] Manual (48174) x  1    [] Other:  [] TA x      [] Mech Traction (40140)  [] ES(attended) (26856)      [] ES (un) (94210):       GOALS:     Long Term Goals: To be achieved in: 6 weeks  1. Disability index score of 30% or less for the LEFS to assist with reaching prior level of function. [x] Progressing: [] Met: [] Not Met: [] Adjusted  2. Patient will demonstrate an increase in Strength to good proximal hip and core activation to allow for proper functional mobility as indicated by patients Functional Deficits.    [x] Progressing: [] Met: [] Not Met: [] Adjusted  3. Patient will return to stair functional activities without increased symptoms or restriction. [x] Progressing: able to PF almost to normal during stair navigation- continues to be apprehensive about not using HR [] Met: [x] Not Met: [] Adjusted  4. Pt will return to golf. [] Progressing: [] Met: [x] Not Met: [] Adjusted     Progression Towards Functional goals:  [x] Patient is progressing as expected towards functional goals listed. [] Progression is slowed due to complexities listed. [] Progression has been slowed due to co-morbidities. [] Plan just implemented, too soon to assess goals progression  [] Other:         Overall Progression Towards Functional goals/ Treatment Progress Update:  [x] Patient is progressing as expected towards functional goals listed. [] Progression is slowed due to complexities/Impairments listed. [] Progression has been slowed due to co-morbidities. [] Plan just implemented, too soon to assess goals progression <30days   [] Goals require adjustment due to lack of progress  [] Patient is not progressing as expected and requires additional follow up with physician  [] Other    Prognosis for POC: [x] Good [] Fair  [] Poor      Patient requires continued skilled intervention: [x] Yes  [] No    Treatment/Activity Tolerance:  [x] Patient able to complete treatment  [] Patient limited by fatigue  [] Patient limited by pain    [] Patient limited by other medical complications  [] Other:     ASSESSMENT: More sore than not, held on most TE.     Return to Play: (if applicable)   []  Stage 1: Intro to Strength   []  Stage 2: Return to Run and Strength   []  Stage 3: Return to Jump and Strength   []  Stage 4: Dynamic Strength and Agility   []  Stage 5: Sport Specific Training     []  Ready to Return to Play, Meets All Above Stages   []  Not Ready for Return to Sports   Comments:                               PLAN: See emilee  [x] Continue per plan of

## 2021-06-03 ENCOUNTER — HOSPITAL ENCOUNTER (OUTPATIENT)
Dept: PHYSICAL THERAPY | Age: 51
Setting detail: THERAPIES SERIES
Discharge: HOME | End: 2021-06-03
Payer: COMMERCIAL

## 2021-06-03 PROCEDURE — 97112 NEUROMUSCULAR REEDUCATION: CPT | Performed by: PHYSICAL THERAPIST

## 2021-06-03 PROCEDURE — 97110 THERAPEUTIC EXERCISES: CPT | Performed by: PHYSICAL THERAPIST

## 2021-06-03 PROCEDURE — 97140 MANUAL THERAPY 1/> REGIONS: CPT | Performed by: PHYSICAL THERAPIST

## 2021-06-03 NOTE — PLAN OF CARE
toe wedge stretch                  Heel raise on decline   Mod range   KDL    with table for balance    gastroc KDL     4 in step post reach  Minimal knee flexion to limit knee pain   Stretching routine review: standing hip ER, standing HF, standing HS 8 min  Adjusted plane bias, technique         Manual Intervention (25814)      STW plantar arch   IASTM   hypervolt gastroc/soleus, ant tib, post tib, peroneals, medial arch      KT for medial arch support, ant tib fac      MFD ant tib x2, post tib x1, lateral gastroc x2      Plantar fascia and abductor hallucis massage       Long leg distraction      STW incision, myofascial release VL,RF  5 min/5 min     hypervolt 1 min each   TFL, VL, RF, incision   NMR re-education (93281)   CUES NEEDED         Bosu ATW   Heels at small [place]   1/2 roll FSU to contralateral TT TM, FSU to lateral TT TM      1/2 roll  LSU to contralateral TT TM   x15 R           RXX, RWX, LXX chest pass rebounder     airex R tandem stance chest pass           Bosu 1/2 foam roll MB chop/lift x10 each     B 1/2 roll R tandem MB chop/lift x10 each     Tandem stance 1/2 rolls            Ant lunge iso D.D     D. D kickstand hip hinge                 Therapeutic Exercise and NMR EXR  [] (74425) Provided verbal/tactile cueing for activities related to strengthening, flexibility, endurance, ROM for improvements in LE, proximal hip, and core control with self care, mobility, lifting, ambulation.  [] (84547) Provided verbal/tactile cueing for activities related to improving balance, coordination, kinesthetic sense, posture, motor skill, proprioception  to assist with LE, proximal hip, and core control in self care, mobility, lifting, ambulation and eccentric single leg control.      NMR and Therapeutic Activities:    [] (22554 or 58570) Provided verbal/tactile cueing for activities related to improving balance, coordination, kinesthetic sense, posture, motor skill, proprioception and motor activation to allow for proper function of core, proximal hip and LE with self care and ADLs  [] (30804) Gait Re-education- Provided training and instruction to the patient for proper LE, core and proximal hip recruitment and positioning and eccentric body weight control with ambulation re-education including up and down stairs     Home Exercise Program:    [x] (10908) Reviewed/Progressed HEP activities related to strengthening, flexibility, endurance, ROM of core, proximal hip and LE for functional self-care, mobility, lifting and ambulation/stair navigation   [] (27619)Reviewed/Progressed HEP activities related to improving balance, coordination, kinesthetic sense, posture, motor skill, proprioception of core, proximal hip and LE for self care, mobility, lifting, and ambulation/stair navigation      Manual Treatments:  PROM / STM / Oscillations-Mobs:  G-I, II, III, IV (PA's, Inf., Post.)  [] (83756) Provided manual therapy to mobilize LE, proximal hip and/or LS spine soft tissue/joints for the purpose of modulating pain, promoting relaxation,  increasing ROM, reducing/eliminating soft tissue swelling/inflammation/restriction, improving soft tissue extensibility and allowing for proper ROM for normal function with self care, mobility, lifting and ambulation. Modalities:     [] GAME READY (VASO)- for significant edema, swelling, pain control. Charges:  Timed Code Treatment Minutes: 40   Total Treatment Minutes: 40     [facility] time in/time out:   (and requires time in and out for each CPT code)    [x] EVAL (LOW) 70216   [] EVAL (MOD) 34356  [] EVAL (HIGH) 73197   [] RE-EVAL     [x] YX(92079) x   1  [] IONTO  [x] NMR (99775) x  1   [] VASO  [x] Manual (78006) x  1    [] Other:  [] TA x      [] Mech Traction (05185)  [] ES(attended) (16351)      [] ES (un) (05045):       GOALS:     Long Term Goals: To be achieved in: 6 weeks  1. Disability index score of 30% or less for the LEFS to assist with reaching prior level of function.    [x] Progressing: [] Met: [] Not Met: [] Adjusted  2. Patient will demonstrate an increase in Strength to good proximal hip and core activation to allow for proper functional mobility as indicated by patients Functional Deficits. [x] Progressing: [] Met: [] Not Met: [] Adjusted  3. Patient will return to stair functional activities without increased symptoms or restriction. [x] Progressing: able to PF almost to normal during stair navigation- continues to be apprehensive about not using HR [] Met: [x] Not Met: [] Adjusted  4. Pt will return to golf. [] Progressing: [] Met: [x] Not Met: [] Adjusted     Progression Towards Functional goals:  [x] Patient is progressing as expected towards functional goals listed. [] Progression is slowed due to complexities listed. [] Progression has been slowed due to co-morbidities. [] Plan just implemented, too soon to assess goals progression  [] Other:         Overall Progression Towards Functional goals/ Treatment Progress Update:  [x] Patient is progressing as expected towards functional goals listed. [] Progression is slowed due to complexities/Impairments listed. [] Progression has been slowed due to co-morbidities. [] Plan just implemented, too soon to assess goals progression <30days   [] Goals require adjustment due to lack of progress  [] Patient is not progressing as expected and requires additional follow up with physician  [] Other    Prognosis for POC: [x] Good [] Fair  [] Poor      Patient requires continued skilled intervention: [x] Yes  [] No    Treatment/Activity Tolerance:  [x] Patient able to complete treatment  [] Patient limited by fatigue  [] Patient limited by pain    [] Patient limited by other medical complications  [] Other:     ASSESSMENT: Pt with improved functional DF mobility. Demonstrates hip mobility deficits in ER and IR, as well as STW deficits replicating lateral nerve symptoms.   Discussed HEP technique to limit lumbar aggravation and promote appropriate hip joint position during standing ER and HS stretching. Pt will benefit from continuing PT to normalize RLE mechanics during gait. Return to Play: (if applicable)   []  Stage 1: Intro to Strength   []  Stage 2: Return to Run and Strength   []  Stage 3: Return to Jump and Strength   []  Stage 4: Dynamic Strength and Agility   []  Stage 5: Sport Specific Training     []  Ready to Return to Play, Meets All Above Stages   []  Not Ready for Return to Sports   Comments:                               PLAN: See eval  [x] Continue per plan of care: 1x/ every other week 4 weeks [] Alter current plan (see comments above)  [] Plan of care initiated [] Hold pending MD visit [] Discharge      Electronically signed by:  David Ly, PT , DPT, CDNT    Note: If patient does not return for scheduled/ recommended follow up visits, this note will serve as a discharge from care along with most recent update on progress.

## 2021-06-10 ENCOUNTER — HOSPITAL ENCOUNTER (OUTPATIENT)
Dept: PHYSICAL THERAPY | Age: 51
Setting detail: THERAPIES SERIES
Discharge: HOME | End: 2021-06-10
Payer: COMMERCIAL

## 2021-06-10 PROCEDURE — 97140 MANUAL THERAPY 1/> REGIONS: CPT | Performed by: PHYSICAL THERAPIST

## 2021-06-10 NOTE — PLAN OF CARE
The [facility] and [facility], 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  ·  Treatment Diagnosis: R ankle pain A97.048  Insurance/Certification information:     Physician Information:  Referring Practitioner: Gael Benavides  Has the plan of care been signed (Y/N):        []  Yes  [x]  No       Is this a Progress Report:     []  Yes  [x]  No        If Yes:  Date Range for reporting period:  Beginning 2021  Ending 6/3/2021    Progress report will be due (10 Rx or 30 days whichever is less): 4449       Recertification will be due (POC Duration  / 90 days whichever is less): 7/3/2021      Visit # Insurance Allowable Auth Required   In-person 10/54  []  Yes []  No    Telehealth   []  Yes []  No    Total             Initial Initial Current   VAS 2       LEFS 41%   38%   ROM LEFT RIGHT     Ankle DF 5 5     Functional DF 5.5 cm 5.5 cm L 6.5, R 8.5              Ankle Ev         Strength  LEFT RIGHT     Ankle DF   5  5    Ankle PF   SL HR 2 in from ground  x4    Ankle Inv   4+  4+    Ankle EV   4+ 5     See below for hip assessment    Number of Comorbidities:  []0     [x]1-2    []3+    Latex Allergy:  [x]NO      []YES  Preferred Language for Healthcare:   [x]English       []other:    SUBJECTIVE:  Pt reports his ankle is much better since beginning PT- he still has to be careful with steps and walking. Denies any \"collapsing\" episodes in his hip.   His back is back to normal.    OBJECTIVE:    Observation: restriction TFL, old incision, VL   Test measurements:  + Silver B: R>L- R limited sp, L limited sp and fp; reproduction of R hip pain with IR in [place] position; deficits in prone hip IR compared to L; standing hip ER deficits    RESTRICTIONS/PRECAUTIONS:  HTN, OA    Exercises/Interventions:     Therapeutic Ex (06345) Sets/sec Reps Notes/CUES   Pt ed: anatomy with foot/ankle model use/intrinsic firing, activity mod      Quad stretch on chair  4 x :30                 Great toe wedge stretch                  Heel raise on decline   Mod range   KDL    with table for balance    gastroc KDL     4 in step post reach  Minimal knee flexion to limit knee pain   Stretching routine review: standing hip ER, standing HF, standing HS   Adjusted plane bias, technique         Manual Intervention (73669)      STW plantar arch   IASTM   hypervolt gastroc/soleus, ant tib, post tib, peroneals, medial arch      KT for medial arch support, ant tib fac      MFD ant tib x2, post tib x1, medial gastroc x1  5 min      Plantar fascia and abductor hallucis massage       Long leg distraction      STW stick ITB and VL/ VL and RF   5 min / 4 min      STW VL  4 min     hypervolt    TFL, VL, RF, incision   Passive prone stretch B  :30 x 4 ea            NMR re-education (66593)   CUES NEEDED         Bosu ATW   Heels at small [place]   1/2 roll FSU to contralateral TT TM, FSU to lateral TT TM      1/2 roll  LSU to contralateral TT TM   x15 R           RXX, RWX, LXX chest pass rebounder     airex R tandem stance chest pass           Bosu 1/2 foam roll MB chop/lift     B 1/2 roll R tandem MB chop/lift     Tandem stance 1/2 rolls            Ant lunge iso D.D     D. D kickstand hip hinge                 Therapeutic Exercise and NMR EXR  [] (79617) Provided verbal/tactile cueing for activities related to strengthening, flexibility, endurance, ROM for improvements in LE, proximal hip, and core control with self care, mobility, lifting, ambulation.  [] (59558) Provided verbal/tactile cueing for activities related to improving balance, coordination, kinesthetic sense, posture, motor skill, proprioception  to assist with LE, proximal hip, and core control in self care, mobility, lifting, ambulation and eccentric single leg control. NMR and Therapeutic Activities:    [] (39991 or 57598) Provided verbal/tactile cueing for activities related to improving balance, coordination, kinesthetic sense, posture, motor skill, proprioception and motor activation to allow for proper function of core, proximal hip and LE with self care and ADLs  [] (98043) Gait Re-education- Provided training and instruction to the patient for proper LE, core and proximal hip recruitment and positioning and eccentric body weight control with ambulation re-education including up and down stairs     Home Exercise Program:    [x] (25977) Reviewed/Progressed HEP activities related to strengthening, flexibility, endurance, ROM of core, proximal hip and LE for functional self-care, mobility, lifting and ambulation/stair navigation   [] (50954)Reviewed/Progressed HEP activities related to improving balance, coordination, kinesthetic sense, posture, motor skill, proprioception of core, proximal hip and LE for self care, mobility, lifting, and ambulation/stair navigation      Manual Treatments:  PROM / STM / Oscillations-Mobs:  G-I, II, III, IV (PA's, Inf., Post.)  [x] (16047) Provided manual therapy to mobilize LE, proximal hip and/or LS spine soft tissue/joints for the purpose of modulating pain, promoting relaxation,  increasing ROM, reducing/eliminating soft tissue swelling/inflammation/restriction, improving soft tissue extensibility and allowing for proper ROM for normal function with self care, mobility, lifting and ambulation. Modalities:     [] GAME READY (VASO)- for significant edema, swelling, pain control.      Charges:  Timed Code Treatment Minutes: 35   Total Treatment Minutes: 35     [facility] time in/time out:   (and requires time in and out for each CPT code)    [] EVAL (LOW) 48633   [] EVAL (MOD) 56438  [] EVAL (HIGH) 28249   [] RE-EVAL     [] AJ(11577) x    [] IONTO  [] NMR (71801) x     [] VASO  [x] Manual (88892) x  2    [] Other:  [] TA x      [] [facility] (51102)  [] ES(attended) (83724)      [] ES (un) (91272):       GOALS:     Long Term Goals: To be achieved in: 6 weeks  1. Disability index score of 30% or less for the LEFS to assist with reaching prior level of function. [x] Progressing: [] Met: [] Not Met: [] Adjusted  2. Patient will demonstrate an increase in Strength to good proximal hip and core activation to allow for proper functional mobility as indicated by patients Functional Deficits. [x] Progressing: [] Met: [] Not Met: [] Adjusted  3. Patient will return to stair functional activities without increased symptoms or restriction. [x] Progressing: able to PF almost to normal during stair navigation- continues to be apprehensive about not using HR [] Met: [x] Not Met: [] Adjusted  4. Pt will return to golf. [] Progressing: [] Met: [x] Not Met: [] Adjusted     Progression Towards Functional goals:  [x] Patient is progressing as expected towards functional goals listed. [] Progression is slowed due to complexities listed. [] Progression has been slowed due to co-morbidities. [] Plan just implemented, too soon to assess goals progression  [] Other:         Overall Progression Towards Functional goals/ Treatment Progress Update:  [x] Patient is progressing as expected towards functional goals listed. [] Progression is slowed due to complexities/Impairments listed. [] Progression has been slowed due to co-morbidities.   [] Plan just implemented, too soon to assess goals progression <30days   [] Goals require adjustment due to lack of progress  [] Patient is not progressing as expected and requires additional follow up with physician  [] Other    Prognosis for POC: [x] Good [] Fair  [] Poor      Patient requires continued skilled intervention: [x] Yes  [] No    Treatment/Activity Tolerance:  [x] Patient able to complete treatment  [] Patient limited by fatigue  [] Patient limited by pain    [] Patient limited by other medical complications  [] Other:     ASSESSMENT: Tightness noted in middle 1/3 of VL on R. Able to make improvements in tissue tension after manuals and stretching. Primarily worked on loosening tissue in anterior quad. Moderate eryhtmea noted with cupping, improvements from previous cupping interventions. Return to Play: (if applicable)   []  Stage 1: Intro to Strength   []  Stage 2: Return to Run and Strength   []  Stage 3: Return to Jump and Strength   []  Stage 4: Dynamic Strength and Agility   []  Stage 5: Sport Specific Training     []  Ready to Return to Play, Meets All Above Stages   []  Not Ready for Return to Sports   Comments:                               PLAN: See eval  [x] Continue per plan of care: 1x/ every other week 4 weeks [] Alter current plan (see comments above)  [] Plan of care initiated [] Hold pending MD visit [] Discharge      Electronically signed by:  Taurus Giles, PT , DPT, BLAZE Murillo, SPT Therapist was present, directed the patient's care, made skilled judgement, and was responsible for assessment and treatment of the patient. Note: If patient does not return for scheduled/ recommended follow up visits, this note will serve as a discharge from care along with most recent update on progress.

## 2021-06-17 ENCOUNTER — HOSPITAL ENCOUNTER (OUTPATIENT)
Dept: PHYSICAL THERAPY | Age: 51
Setting detail: THERAPIES SERIES
Discharge: HOME | End: 2021-06-17
Payer: COMMERCIAL

## 2021-06-17 PROCEDURE — 97140 MANUAL THERAPY 1/> REGIONS: CPT | Performed by: PHYSICAL THERAPIST

## 2021-06-17 PROCEDURE — 97110 THERAPEUTIC EXERCISES: CPT | Performed by: PHYSICAL THERAPIST

## 2021-06-17 PROCEDURE — 97112 NEUROMUSCULAR REEDUCATION: CPT | Performed by: PHYSICAL THERAPIST

## 2021-06-17 NOTE — FLOWSHEET NOTE
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  ·  Treatment Diagnosis: R ankle pain S73.340  Insurance/Certification information:     Physician Information:  Referring Practitioner: Krys Magdaleno  Has the plan of care been signed (Y/N):        []  Yes  [x]  No       Is this a Progress Report:     []  Yes  [x]  No        If Yes:  Date Range for reporting period:  Beginning 2021  Ending 6/3/2021    Progress report will be due (10 Rx or 30 days whichever is less): 7560       Recertification will be due (POC Duration  / 90 days whichever is less): 7/3/2021      Visit # Insurance Allowable Auth Required   In-person   []  Yes []  No    Telehealth   []  Yes []  No    Total             Initial Initial Current   VAS 2       LEFS 41%   38%   ROM LEFT RIGHT     Ankle DF 5 5     Functional DF 5.5 cm 5.5 cm L 6.5, R 8.5              Ankle Ev         Strength  LEFT RIGHT     Ankle DF   5  5    Ankle PF   SL HR 2 in from ground  x4    Ankle Inv   4+  4+    Ankle EV   4+ 5     See below for hip assessment    Number of Comorbidities:  []0     [x]1-2    []3+    Latex Allergy:  [x]NO      []YES  Preferred Language for Healthcare:   [x]English       []other:    SUBJECTIVE:  Pt reports his ankle is status quo and he is understanding how to manage it. His hip is responding well the PT. He can't tell if the numbness that has been there since surgery is from his hip or his back.     OBJECTIVE:    Observation: restriction TFL, old incision, VL   Test measurements:  + Silver B: R>L- R limited sp, L limited sp and fp; reproduction of R hip pain [facility]; deficits in prone hip IR compared to L; standing hip ER deficits    RESTRICTIONS/PRECAUTIONS:  HTN, OA    Exercises/Interventions:     Therapeutic Ex (23052) Sets/sec Reps Notes/CUES   Pt ed: posture with new chair, hip motor control without lumbar compensation 8 min     Quad stretch on chair  4 x :30           SL ABD  Prone hip ext with glut firing ed x10  x10 R/L                       Heel raise on decline   Mod range   KDL    with table for balance    gastroc KDL     4 in step post reach  Minimal knee flexion to limit knee pain   Stretching routine review: standing hip ER, standing HF, standing HS   Adjusted plane bias, technique         Manual Intervention (78929)      STW plantar arch   IASTM   hypervolt gastroc/soleus, ant tib, post tib, peroneals, medial arch      KT for medial arch support, ant tib fac      MFD ant tib x2, post tib x1, medial gastroc x1       Plantar fascia and abductor hallucis massage       TFL deep release 3min x3      STW stick ITB and VL/ VL and RF   5 min / 4 min      STW VL  4 min     hypervolt    TFL, VL, RF, incision   Passive prone stretch B  :30 x 4 ea            NMR re-education (65173)   CUES NEEDED         Bosu ATW   Heels at small [place]   1/2 roll FSU to contralateral TT TM, FSU to lateral TT TM      1/2 roll  LSU to contralateral TT TM   x15 R           RXX, RWX, LXX chest pass rebounder     airex R tandem stance chest pass           Bosu 1/2 foam roll MB chop/lift     B 1/2 roll R tandem MB chop/lift     Tandem stance 1/2 rolls            Ant lunge iso D.D     D. D kickstand hip hinge                 Therapeutic Exercise and NMR EXR  [] (33052) Provided verbal/tactile cueing for activities related to strengthening, flexibility, endurance, ROM for improvements in LE, proximal hip, and core control with self care, mobility, lifting, ambulation.  [] (31550) Provided verbal/tactile cueing for activities related to improving balance, coordination, kinesthetic sense, posture, motor skill, proprioception  to assist with LE, proximal hip, and core control in self care, mobility, lifting, ambulation and eccentric single leg control. NMR and Therapeutic Activities:    [] (63121 or 55316) Provided verbal/tactile cueing for activities related to improving balance, coordination, kinesthetic sense, posture, motor skill, proprioception and motor activation to allow for proper function of core, proximal hip and LE with self care and ADLs  [] (54064) Gait Re-education- Provided training and instruction to the patient for proper LE, core and proximal hip recruitment and positioning and eccentric body weight control with ambulation re-education including up and down stairs     Home Exercise Program:    [x] (52307) Reviewed/Progressed HEP activities related to strengthening, flexibility, endurance, ROM of core, proximal hip and LE for functional self-care, mobility, lifting and ambulation/stair navigation   [] (08435)Reviewed/Progressed HEP activities related to improving balance, coordination, kinesthetic sense, posture, motor skill, proprioception of core, proximal hip and LE for self care, mobility, lifting, and ambulation/stair navigation      Manual Treatments:  PROM / STM / Oscillations-Mobs:  G-I, II, III, IV (PA's, Inf., Post.)  [x] (71174) Provided manual therapy to mobilize LE, proximal hip and/or LS spine soft tissue/joints for the purpose of modulating pain, promoting relaxation,  increasing ROM, reducing/eliminating soft tissue swelling/inflammation/restriction, improving soft tissue extensibility and allowing for proper ROM for normal function with self care, mobility, lifting and ambulation. Modalities:     [] GAME READY (VASO)- for significant edema, swelling, pain control.      Charges:  Timed Code Treatment Minutes: 42   Total Treatment Minutes: 42     B[facility] time in/time out:   (and requires time in and out for each CPT code)    [] EVAL (LOW) 25253   [] EVAL (MOD) 94981  [] EVAL (HIGH) 98794

## 2021-06-24 ENCOUNTER — HOSPITAL ENCOUNTER (OUTPATIENT)
Dept: PHYSICAL THERAPY | Age: 51
Setting detail: THERAPIES SERIES
Discharge: HOME | End: 2021-06-24
Payer: COMMERCIAL

## 2021-06-24 PROCEDURE — 97112 NEUROMUSCULAR REEDUCATION: CPT | Performed by: PHYSICAL THERAPIST

## 2021-06-24 PROCEDURE — 97140 MANUAL THERAPY 1/> REGIONS: CPT | Performed by: PHYSICAL THERAPIST

## 2021-06-24 PROCEDURE — 97110 THERAPEUTIC EXERCISES: CPT | Performed by: PHYSICAL THERAPIST

## 2021-06-24 NOTE — FLOWSHEET NOTE
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  ·  Treatment Diagnosis: R ankle pain R09.517  Insurance/Certification information:     Physician Information:  Referring Practitioner: Neyda Abel  Has the plan of care been signed (Y/N):        []  Yes  [x]  No       Is this a Progress Report:     []  Yes  [x]  No        If Yes:  Date Range for reporting period:  Beginning 2021  Ending 6/3/2021    Progress report will be due (10 Rx or 30 days whichever is less): 3217       Recertification will be due (POC Duration  / 90 days whichever is less): 7/3/2021      Visit # Insurance Allowable Auth Required   In-person   []  Yes []  No    Telehealth   []  Yes []  No    Total             Initial Initial Current   VAS 2       LEFS 41%   38%   ROM LEFT RIGHT     Ankle DF 5 5     Functional DF 5.5 cm 5.5 cm L 6.5, R 8.5              Ankle Ev         Strength  LEFT RIGHT     Ankle DF   5  5    Ankle PF   SL HR 2 in from ground  x4    Ankle Inv   4+  4+    Ankle EV   4+ 5     See below for hip assessment    Number of Comorbidities:  []0     [x]1-2    []3+    Latex Allergy:  [x]NO      []YES  Preferred Language for Healthcare:   [x]English       []other:    SUBJECTIVE:  Pt reports his ankle has been a little sore this past week, but today it is fine. He maybe notices a little more sensation in his leg.     OBJECTIVE:    Observation: restriction TFL, old incision, VL   Test measurements:  + Silver B: R>L- R limited sp, L limited sp and fp; reproduction of R hip pain with IR in [place]ily Freud position; deficits in prone hip IR compared to L; standing hip ER deficits    RESTRICTIONS/PRECAUTIONS:  HTN, OA    Exercises/Interventions:     Therapeutic Ex (11304) Sets/sec Reps Notes/CUES   Pt ed: posture with new chair, hip motor control without lumbar compensation 8 min     Quad stretch on chair  4 x :30           SL ABD  Prone hip ext with glut firing ed x10  x10 R/L                       Heel raise on decline   Mod range   KDL  x10 HHA  x5 without assist  x10 7# HHA  with table for balance    gastroc KDL     4 in step post reach  Minimal knee flexion to limit knee pain   Stretching routine review: standing hip ER, standing HF, standing HS   Adjusted plane bias, technique         Manual Intervention (92738)      STW plantar arch   IASTM   hypervolt gastroc/soleus, ant tib, post tib, peroneals, medial arch      KT for medial arch support, ant tib fac      MFD ant tib x2, post tib x1, medial gastroc x1       Plantar fascia and abductor hallucis massage       TFL deep release 3min x3      STW stick ITB and VL/ VL and RF   5 min / 4 min      STW VL  4 min     hypervolt    TFL, VL, RF, incision   Passive prone stretch IR stretch  2x10  Contract release         NMR re-education (99310)   CUES NEEDED   Prone IR iso  Prone IR iso contralateral LE resist :5x5  :5x5 R/L      Cues to limit pelvic rotation   Prone IR TB 2x12 R/L     Bosu ATW   Heels at small [place]   1/2 roll FSU to contralateral TT TM, FSU to lateral TT TM      1/2 roll  LSU to contralateral TT TM   x15 R           RXX, RWX, LXX chest pass rebounder     airex R tandem stance chest pass           Bosu 1/2 foam roll MB chop/lift     B 1/2 roll R tandem MB chop/lift     Tandem stance 1/2 rolls            Ant lunge iso D.D     D. D kickstand hip hinge                 Therapeutic Exercise and NMR EXR  [] (14078) Provided verbal/tactile cueing for activities related to strengthening, flexibility, endurance, ROM for improvements in LE, proximal hip, and core control with self care, mobility, lifting, ambulation.  [] (67362) Provided verbal/tactile cueing for activities related to improving balance, coordination, kinesthetic sense, posture, motor skill, proprioception  to assist with LE, proximal hip, and core control in self care, mobility, lifting, ambulation and eccentric single leg control. NMR and Therapeutic Activities:    [] (65882 or 59192) Provided verbal/tactile cueing for activities related to improving balance, coordination, kinesthetic sense, posture, motor skill, proprioception and motor activation to allow for proper function of core, proximal hip and LE with self care and ADLs  [] (45883) Gait Re-education- Provided training and instruction to the patient for proper LE, core and proximal hip recruitment and positioning and eccentric body weight control with ambulation re-education including up and down stairs     Home Exercise Program:    [x] (06522) Reviewed/Progressed HEP activities related to strengthening, flexibility, endurance, ROM of core, proximal hip and LE for functional self-care, mobility, lifting and ambulation/stair navigation   [] (13555)Reviewed/Progressed HEP activities related to improving balance, coordination, kinesthetic sense, posture, motor skill, proprioception of core, proximal hip and LE for self care, mobility, lifting, and ambulation/stair navigation      Manual Treatments:  PROM / STM / Oscillations-Mobs:  G-I, II, III, IV (PA's, Inf., Post.)  [x] (48625) Provided manual therapy to mobilize LE, proximal hip and/or LS spine soft tissue/joints for the purpose of modulating pain, promoting relaxation,  increasing ROM, reducing/eliminating soft tissue swelling/inflammation/restriction, improving soft tissue extensibility and allowing for proper ROM for normal function with self care, mobility, lifting and ambulation. Modalities:     [] GAME READY (VASO)- for significant edema, swelling, pain control.      Charges:  Timed Code Treatment Minutes: 42   Total Treatment Minutes: 42     BWC time in/time out:   (and requires time in and out for each CPT code)    [] EVAL (LOW) 01069   [] EVAL (MOD) 77347  [] EVAL (HIGH) 54029   [] RE-EVAL     [x] YG(55806) x  1  [] IONTO  [x] NMR (09814) x  1   [] VASO  [x] Manual (87801) x 1    [] Other:  [] TA x      [] Mech Traction (45349)  [] ES(attended) (35440)      [] ES (un) (03936):       GOALS:     Long Term Goals: To be achieved in: 6 weeks  1. Disability index score of 30% or less for the LEFS to assist with reaching prior level of function. [x] Progressing: [] Met: [] Not Met: [] Adjusted  2. Patient will demonstrate an increase in Strength to good proximal hip and core activation to allow for proper functional mobility as indicated by patients Functional Deficits. [x] Progressing: [] Met: [] Not Met: [] Adjusted  3. Patient will return to stair functional activities without increased symptoms or restriction. [x] Progressing: able to PF almost to normal during stair navigation- continues to be apprehensive about not using HR [] Met: [x] Not Met: [] Adjusted  4. Pt will return to golf. [] Progressing: [] Met: [x] Not Met: [] Adjusted     Progression Towards Functional goals:  [x] Patient is progressing as expected towards functional goals listed. [] Progression is slowed due to complexities listed. [] Progression has been slowed due to co-morbidities. [] Plan just implemented, too soon to assess goals progression  [] Other:         Overall Progression Towards Functional goals/ Treatment Progress Update:  [x] Patient is progressing as expected towards functional goals listed. [] Progression is slowed due to complexities/Impairments listed. [] Progression has been slowed due to co-morbidities.   [] Plan just implemented, too soon to assess goals progression <30days   [] Goals require adjustment due to lack of progress  [] Patient is not progressing as expected and requires additional follow up with physician  [] Other    Prognosis for POC: [x] Good [] Fair  [] Poor      Patient requires continued skilled intervention: [x] Yes  [] No    Treatment/Activity Tolerance:  [x] Patient able to complete treatment  [] Patient limited by fatigue  [] Patient limited by pain    [] Patient limited by other medical complications  [] Other:     ASSESSMENT: Tolerated session well. Motor control deficit in IR noted on RLE; ROM deficit IR in LLE. Return to Play: (if applicable)   []  Stage 1: Intro to Strength   []  Stage 2: Return to Run and Strength   []  Stage 3: Return to Jump and Strength   []  Stage 4: Dynamic Strength and Agility   []  Stage 5: Sport Specific Training     []  Ready to Return to Play, Meets All Above Stages   []  Not Ready for Return to Sports   Comments:                               PLAN: See eval  [x] Continue per plan of care: 1x/ every other week 4 weeks [] Alter current plan (see comments above)  [] Plan of care initiated [] Hold pending MD visit [] Discharge      Electronically signed by:  Rhett cAuña, PT , DPT, CDNT         Note: If patient does not return for scheduled/ recommended follow up visits, this note will serve as a discharge from care along with most recent update on progress.

## 2021-07-08 ENCOUNTER — HOSPITAL ENCOUNTER (OUTPATIENT)
Dept: PHYSICAL THERAPY | Age: 51
Setting detail: THERAPIES SERIES
Discharge: HOME | End: 2021-07-08
Payer: COMMERCIAL

## 2021-07-08 PROCEDURE — 97140 MANUAL THERAPY 1/> REGIONS: CPT | Performed by: PHYSICAL THERAPIST

## 2021-07-08 PROCEDURE — 97110 THERAPEUTIC EXERCISES: CPT | Performed by: PHYSICAL THERAPIST

## 2021-07-08 PROCEDURE — 97112 NEUROMUSCULAR REEDUCATION: CPT | Performed by: PHYSICAL THERAPIST

## 2021-07-08 NOTE — FLOWSHEET NOTE
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  ·  Treatment Diagnosis: R ankle pain C35.291  Insurance/Certification information:     Physician Information:  Referring Practitioner: Radha Hoffmann  Has the plan of care been signed (Y/N):        []  Yes  [x]  No       Is this a Progress Report:     []  Yes  [x]  No        If Yes:  Date Range for reporting period:  Beginning 2021  Ending 2021    Progress report will be due (10 Rx or 30 days whichever is less):        Recertification will be due (POC Duration  / 90 days whichever is less): 2021      Visit # Insurance Allowable Auth Required   In-person   []  Yes []  No    Telehealth   []  Yes []  No    Total             Initial Initial Current   VAS 2       LEF 41%   40%   ROM LEFT RIGHT     Ankle DF 5 5     Functional DF 5.5 cm 5.5 cm L 16cm, R 13 cm     prone IR  Prone ER     L 20, R 35  WFL, R 50    Ankle Ev         Strength  LEFT RIGHT     Ankle DF   5  5    Ankle PF   SL HR 2 in from ground  x4    Ankle Inv   4+  4+    Ankle EV   4+ 5     See below for hip assessment    Number of Comorbidities:  []0     [x]1-2    []3+    Latex Allergy:  [x]NO      []YES  Preferred Language for Healthcare:   [x]English       []other:    SUBJECTIVE:  Pt reports his ankle has been a little sore. He has been using the cups to work on his hip and it has been helping.     OBJECTIVE:    Observation: restriction TFL, old incision, VL   Test measurements:  + Silver B: R>L- R limited sp, L limited sp and fp; reproduction of R hip pain with IR in [place] position; deficits in prone hip IR compared to L; standing hip ER deficits    RESTRICTIONS/PRECAUTIONS:  HTN, OA    Exercises/Interventions:     Therapeutic Ex (74693) Sets/sec Reps Notes/CUES   Pt ed: posture with new chair, hip motor control without lumbar compensation 8 min     Quad stretch on chair  4 x :30           SL ABD  Prone hip ext with glut firing ed x10  x10 R/L                       Heel raise on decline   Mod range   KDL  x10 HHA  x5 without assist  x10 7# HHA  with table for balance    gastroc KDL     4 in step post reach  Minimal knee flexion to limit knee pain   Stretching routine review: standing hip ER, standing HF, standing HS   Adjusted plane bias, technique         Manual Intervention (34522)      STW plantar arch   IASTM   hypervolt gastroc/soleus, ant tib, post tib, peroneals, medial arch      KT for medial arch support, ant tib fac      MFD ant tib x2, post tib x1, medial gastroc x1       Plantar fascia and abductor hallucis massage       TFL deep release      STW stick ITB and VL/ VL and RF        STW VL       hypervolt    TFL, VL, RF, incision   Passive prone stretch IR stretch  2x10  Contract release         NMR re-education (89865)   CUES NEEDED   Prone IR iso  Prone IR iso contralateral LE resist       Cues to limit pelvic rotation   Prone IR/ER TB and 4 # weight at ankle 2x12 R/L     Bosu ATW   Heels at small [place]   1/2 roll FSU to contralateral TT TM, FSU to lateral TT TM      1/2 roll  LSU to contralateral TT TM   x15 R           RXX, RWX, LXX chest pass rebounder     airex R tandem stance chest pass           Bosu 1/2 foam roll MB chop/lift     B 1/2 roll R tandem MB chop/lift     Tandem stance 1/2 rolls      Lateral mini lunge x10 R/L     Ant lunge iso D.D     D. D kickstand hip hinge                 Therapeutic Exercise and NMR EXR  [] (85895) Provided verbal/tactile cueing for activities related to strengthening, flexibility, endurance, ROM for improvements in LE, proximal hip, and core control with self care, mobility, lifting, ambulation.  [] (06905) Provided verbal/tactile cueing for activities related to improving balance, coordination, kinesthetic sense, posture, motor skill, proprioception  to assist with LE, proximal hip, and core control in self care, mobility, lifting, ambulation and eccentric single leg control. NMR and Therapeutic Activities:    [] (79846 or 58566) Provided verbal/tactile cueing for activities related to improving balance, coordination, kinesthetic sense, posture, motor skill, proprioception and motor activation to allow for proper function of core, proximal hip and LE with self care and ADLs  [] (01236) Gait Re-education- Provided training and instruction to the patient for proper LE, core and proximal hip recruitment and positioning and eccentric body weight control with ambulation re-education including up and down stairs     Home Exercise Program:    [x] (93091) Reviewed/Progressed HEP activities related to strengthening, flexibility, endurance, ROM of core, proximal hip and LE for functional self-care, mobility, lifting and ambulation/stair navigation   [] (61625)Reviewed/Progressed HEP activities related to improving balance, coordination, kinesthetic sense, posture, motor skill, proprioception of core, proximal hip and LE for self care, mobility, lifting, and ambulation/stair navigation      Manual Treatments:  PROM / STM / Oscillations-Mobs:  G-I, II, III, IV (PA's, Inf., Post.)  [x] (42187) Provided manual therapy to mobilize LE, proximal hip and/or LS spine soft tissue/joints for the purpose of modulating pain, promoting relaxation,  increasing ROM, reducing/eliminating soft tissue swelling/inflammation/restriction, improving soft tissue extensibility and allowing for proper ROM for normal function with self care, mobility, lifting and ambulation. Modalities:     [] GAME READY (VASO)- for significant edema, swelling, pain control.      Charges:  Timed Code Treatment Minutes: 42   Total Treatment Minutes: 42     C time in/time out:   (and requires time in and out for each CPT code)    [] EVAL (LOW) 15637   [] EVAL (MOD) 13530  [] EVAL (HIGH) 99944   [] RE-EVAL     [x] DL(70493) x  1  [] IONTO  [x] NMR (56696) x  1   [] VASO  [x] Manual ([id number]) x 1    [] Other:  [] TA x      [] [facility] Traction (17182)  [] ES(attended) (27060)      [] ES (un) (20163):       GOALS:     Long Term Goals: To be achieved in: 6 weeks  1. Disability index score of 30% or less for the LEFS to assist with reaching prior level of function. [x] Progressing: [] Met: [] Not Met: [] Adjusted  2. Patient will demonstrate an increase in Strength to good proximal hip and core activation to allow for proper functional mobility as indicated by patients Functional Deficits. [x] Progressing: [] Met: [] Not Met: [] Adjusted  3. Patient will return to stair functional activities without increased symptoms or restriction. [x] Progressing: able to PF almost to normal during stair navigation- continues to be apprehensive about not using HR [] Met: [] Not Met: [] Adjusted  4. Pt will return to golf. [] Progressing: [x] Met: [] Not Met: [] Adjusted         Overall Progression Towards Functional goals/ Treatment Progress Update:  [x] Patient is progressing as expected towards functional goals listed. [] Progression is slowed due to complexities/Impairments listed. [] Progression has been slowed due to co-morbidities. [] Plan just implemented, too soon to assess goals progression <30days   [] Goals require adjustment due to lack of progress  [] Patient is not progressing as expected and requires additional follow up with physician  [] Other    Prognosis for POC: [x] Good [] Fair  [] Poor      Patient requires continued skilled intervention: [x] Yes  [] No    Treatment/Activity Tolerance:  [x] Patient able to complete treatment  [] Patient limited by fatigue  [] Patient limited by pain    [] Patient limited by other medical complications  [] Other:     ASSESSMENT: Responding well to RLE treatment- hip and ankle. Numbness in lateral mid thigh improving; improvements in hip flexiiblity seen, but still asymmetrical.  Continue to progress ROM and strength deficits.     Return to Play: (if applicable)   []  Stage 1: Intro to Strength   []  Stage 2: Return to Run and Strength   []  Stage 3: Return to Jump and Strength   []  Stage 4: Dynamic Strength and Agility   []  Stage 5: Sport Specific Training     []  Ready to Return to Play, Meets All Above Stages   []  Not Ready for Return to Sports   Comments:                               PLAN: See eval  [x] Continue per plan of care: 1x/ every other week 6 weeks [] Alter current plan (see comments above)  [] Plan of care initiated [] Hold pending MD visit [] Discharge      Electronically signed by:  Kimmie Yuan, PT , DPT, CDNT         Note: If patient does not return for scheduled/ recommended follow up visits, this note will serve as a discharge from care along with most recent update on progress.

## 2021-07-27 ENCOUNTER — HOSPITAL ENCOUNTER (OUTPATIENT)
Dept: PHYSICAL THERAPY | Age: 51
Setting detail: THERAPIES SERIES
Discharge: HOME | End: 2021-07-27
Payer: COMMERCIAL

## 2021-07-27 PROCEDURE — 97110 THERAPEUTIC EXERCISES: CPT | Performed by: PHYSICAL THERAPIST

## 2021-07-27 PROCEDURE — 97112 NEUROMUSCULAR REEDUCATION: CPT | Performed by: PHYSICAL THERAPIST

## 2021-07-27 PROCEDURE — 97140 MANUAL THERAPY 1/> REGIONS: CPT | Performed by: PHYSICAL THERAPIST

## 2021-07-27 NOTE — FLOWSHEET NOTE
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  ·  Treatment Diagnosis: R ankle pain H78.468  Insurance/Certification information:     Physician Information:  Referring Practitioner: Kathy Kruse  Has the plan of care been signed (Y/N):        []  Yes  [x]  No       Is this a Progress Report:     []  Yes  [x]  No        If Yes:  Date Range for reporting period:  Beginning 2021  Ending 2021    Progress report will be due (10 Rx or 30 days whichever is less): 3/9/0652       Recertification will be due (POC Duration  / 90 days whichever is less): 2021      Visit # Insurance Allowable Auth Required   In-person   []  Yes []  No    Telehealth   []  Yes []  No    Total             Initial Initial Current   VAS 2       LEF 41%   40%   ROM LEFT RIGHT     Ankle DF 5 5     Functional DF 5.5 cm 5.5 cm L 16cm, R 13 cm     prone IR  Prone ER     L 20, R 35  WFL, R 50    Ankle Ev         Strength  LEFT RIGHT     Ankle DF   5  5    Ankle PF   SL HR 2 in from ground  x4    Ankle Inv   4+  4+    Ankle EV   4+ 5     See below for hip assessment    Number of Comorbidities:  []0     [x]1-2    []3+    Latex Allergy:  [x]NO      []YES  Preferred Language for Healthcare:   [x]English       []other:    SUBJECTIVE:  Pt reports the rotator exercises are helping his hip a lot.   OBJECTIVE:    Observation: restriction TFL, old incision, VL   Test measurements:  + Silver B: R>L- R limited sp, L limited sp and fp; reproduction of R hip pain with IR in Yumiko Bolls position; deficits in prone hip IR compared to L; standing hip ER deficits    RESTRICTIONS/PRECAUTIONS:  HTN, OA    Exercises/Interventions:     Therapeutic Ex (99545) Sets/sec Reps Notes/CUES   Pt ed: posture with new chair, hip motor control without lumbar compensation 8 min     Quad stretch on chair  4 x :30           SL ABD  Prone hip ext with glut firing ed x10  x10 R/L                       Heel raise on decline   Mod range   KDL  x10 HHA  x5 without assist  x10 7# HHA  with table for balance    gastroc KDL     4 in step post reach  Minimal knee flexion to limit knee pain   Stretching routine review: standing hip ER, standing HF, standing HS   Adjusted plane bias, technique         Manual Intervention (59660)      STW plantar arch   IASTM   hypervolt gastroc/soleus, ant tib, post tib, peroneals, medial arch      KT for medial arch support, ant tib fac      MFD ant tib x2, post tib x1, medial gastroc x1       Plantar fascia and abductor hallucis massage       TFL deep release      IASTM ITB 8 min           hypervolt    TFL, VL, RF, incision   Passive prone stretch IR stretch  2x10  Contract release         NMR re-education (99442)   CUES NEEDED   Prone IR iso  Prone IR iso contralateral LE resist       Cues to limit pelvic rotation   Prone IR/ER MRE ecc  2x12 R lengthening into ER  3x10 lengthening into IR     Bosu ATW   Heels at small [place]   1/2 roll FSU to contralateral TT TM, FSU to lateral TT TM      1/2 roll  LSU to contralateral TT TM   x15 R     Step up with rotation YTB for knee RNT 2x10 YTB    XFT, RWX, LXX chest pass rebounder     airex R tandem stance chest pass           Bosu 1/2 foam roll MB chop/lift     B 1/2 roll R tandem MB chop/lift     Tandem stance 1/2 rolls      Lateral mini lunge x10 R/L     Ant lunge iso D.D     D. D kickstand hip hinge                 Therapeutic Exercise and NMR EXR  [] (14718) Provided verbal/tactile cueing for activities related to strengthening, flexibility, endurance, ROM for improvements in LE, proximal hip, and core control with self care, mobility, lifting, ambulation.  [] (51289) Provided verbal/tactile cueing for activities related to improving balance, coordination, kinesthetic sense, posture, motor skill, proprioception  to assist with LE, proximal hip, and core control in self care, mobility, lifting, ambulation and eccentric single leg control. NMR and Therapeutic Activities:    [] (51807 or 39365) Provided verbal/tactile cueing for activities related to improving balance, coordination, kinesthetic sense, posture, motor skill, proprioception and motor activation to allow for proper function of core, proximal hip and LE with self care and ADLs  [] (93959) Gait Re-education- Provided training and instruction to the patient for proper LE, core and proximal hip recruitment and positioning and eccentric body weight control with ambulation re-education including up and down stairs     Home Exercise Program:    [x] (44469) Reviewed/Progressed HEP activities related to strengthening, flexibility, endurance, ROM of core, proximal hip and LE for functional self-care, mobility, lifting and ambulation/stair navigation   [] (25748)Reviewed/Progressed HEP activities related to improving balance, coordination, kinesthetic sense, posture, motor skill, proprioception of core, proximal hip and LE for self care, mobility, lifting, and ambulation/stair navigation      Manual Treatments:  PROM / STM / Oscillations-Mobs:  G-I, II, III, IV (PA's, Inf., Post.)  [x] (94263) Provided manual therapy to mobilize LE, proximal hip and/or LS spine soft tissue/joints for the purpose of modulating pain, promoting relaxation,  increasing ROM, reducing/eliminating soft tissue swelling/inflammation/restriction, improving soft tissue extensibility and allowing for proper ROM for normal function with self care, mobility, lifting and ambulation. Modalities:     [] GAME READY (VASO)- for significant edema, swelling, pain control.      Charges:  Timed Code Treatment Minutes: 38   Total Treatment Minutes: 38     BWC time in/time out:   (and requires time in and out for each CPT code)    [] EVAL (LOW) 69318   [] EVAL (MOD) 21114  [] EVAL (HIGH) 30236   [] RE-EVAL     [x] XW(32822) x  1  [] IONTO  [x] NMR (03505) x  1   [] VASO  [x] Manual (86958) x 1    [] Other:  [] TA x      [] Mech Traction (87426)  [] ES(attended) (77867)      [] ES (un) (12332):       GOALS:     Long Term Goals: To be achieved in: 6 weeks  1. Disability index score of 30% or less for the LEFS to assist with reaching prior level of function. [x] Progressing: [] Met: [] Not Met: [] Adjusted  2. Patient will demonstrate an increase in Strength to good proximal hip and core activation to allow for proper functional mobility as indicated by patients Functional Deficits. [x] Progressing: [] Met: [] Not Met: [] Adjusted  3. Patient will return to stair functional activities without increased symptoms or restriction. [x] Progressing: able to PF almost to normal during stair navigation- continues to be apprehensive about not using HR [] Met: [] Not Met: [] Adjusted  4. Pt will return to golf. [] Progressing: [x] Met: [] Not Met: [] Adjusted         Overall Progression Towards Functional goals/ Treatment Progress Update:  [x] Patient is progressing as expected towards functional goals listed. [] Progression is slowed due to complexities/Impairments listed. [] Progression has been slowed due to co-morbidities.   [] Plan just implemented, too soon to assess goals progression <30days   [] Goals require adjustment due to lack of progress  [] Patient is not progressing as expected and requires additional follow up with physician  [] Other    Prognosis for POC: [x] Good [] Fair  [] Poor      Patient requires continued skilled intervention: [x] Yes  [] No    Treatment/Activity Tolerance:  [x] Patient able to complete treatment  [] Patient limited by fatigue  [] Patient limited by pain    [] Patient limited by other medical complications  [] Other: ASSESSMENT: Responding well to RLE rotation progression- weaker with ecc lengthening into IR. Return to Play: (if applicable)   []  Stage 1: Intro to Strength   []  Stage 2: Return to Run and Strength   []  Stage 3: Return to Jump and Strength   []  Stage 4: Dynamic Strength and Agility   []  Stage 5: Sport Specific Training     []  Ready to Return to Play, Meets All Above Stages   []  Not Ready for Return to Sports   Comments:                               PLAN: See eval  [x] Continue per plan of care: 1x/ every other week 6 weeks [] Alter current plan (see comments above)  [] Plan of care initiated [] Hold pending MD visit [] Discharge      Electronically signed by:  Shannon Nguyễn, PT , DPT, CDNT         Note: If patient does not return for scheduled/ recommended follow up visits, this note will serve as a discharge from care along with most recent update on progress.

## 2021-08-12 ENCOUNTER — HOSPITAL ENCOUNTER (OUTPATIENT)
Dept: PHYSICAL THERAPY | Age: 51
Setting detail: THERAPIES SERIES
Discharge: HOME | End: 2021-08-12
Payer: COMMERCIAL

## 2021-08-12 PROCEDURE — 97112 NEUROMUSCULAR REEDUCATION: CPT | Performed by: PHYSICAL THERAPIST

## 2021-08-12 PROCEDURE — 97140 MANUAL THERAPY 1/> REGIONS: CPT | Performed by: PHYSICAL THERAPIST

## 2021-08-12 PROCEDURE — 97110 THERAPEUTIC EXERCISES: CPT | Performed by: PHYSICAL THERAPIST

## 2021-08-12 NOTE — PLAN OF CARE
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 Sixto Toribio following patient has been evaluated for physical therapy services and for therapy to continue, Medicare requires monthly physician review of the treatment plan. Please review the attached evaluation and/or summary of the patient's plan of care, and verify that you agree therapy should continue by signing the attached document and sending it back to our office. Plan of Care/Treatment to date:  [x] Therapeutic Exercise    [] Modalities:  [] Therapeutic Activity     [] Ultrasound  [] Electric Stimulation  [] Gait Training      [] Cervical Traction [] Lumbar Traction  [x] Neuromuscular Re-education    [] Cold/hotpack [] Iontophoresis   [x] Instruction in HEP     Other:  [] Manual Therapy      []             [] Aquatic Therapy      []           ? Frequency/Duration:  # Days per week: [x] 1 day every other week # Weeks: [] 1 week [] 7 weeks     [] 2 days? [] 2 weeks [] 8 weeks     [] 3 days   [] 3 weeks [] 9 weeks     [] 4 days   [] 4 weeks [] 10 weeks      [] 5 days   [] 5 weeks [] 11 weeks          [x] 6 weeks [] 12 weeks      Rehab Potential: [] Excellent [] Good [] Fair  [] Poor       Electronically signed by:  Duane Ludwig PT, PT        If you have any questions or concerns, please don't hesitate to call.   Thank you for your referral.      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  ·  Treatment Diagnosis: R ankle pain N81.896  Insurance/Certification information:     Physician Information:  Referring Practitioner: Kathy Kruse  Has the plan of care been signed (Y/N):        []  Yes  [x]  No       Is this a Progress Report:     []  Yes  [x]  No        If Yes:  Date Range for reporting period:  Beginning 2021  Ending 2021    Progress report will be due (10 Rx or 30 days whichever is less):        Recertification will be due (POC Duration  / 90 days whichever is less): 2021      Visit # Insurance Allowable Auth Required   In-person 15/54  []  Yes []  No    Telehealth   []  Yes []  No    Total             Initial Initial Current   VAS 2       LEF 41%   40%   ROM LEFT RIGHT     hip   R 95 with anterior tightness   Ankle DF 5 5     Functional DF 5.5 cm 5.5 cm L 13 cm, R 12.5 cm     prone IR  Prone ER     L 20, R 35  WFL, R 55    Ankle Ev         Strength  LEFT RIGHT     Ankle DF   5  5    Ankle PF   SL HR 2 in from ground  x4    Ankle Inv   4+  4+    Ankle EV   4+ 5   Prone IR/seated   L 4, R 4+/4/4+   Prone ER/seated   L 4+, R 4/4+/4     See below for hip assessment    Number of Comorbidities:  []0     [x]1-2    []3+    Latex Allergy:  [x]NO      []YES  Preferred Language for Healthcare:   [x]English       []other:    SUBJECTIVE:  Pt reports he had 4-5 days of no pain in his ankle, but today he is sore. He reports his back is doing much better- no debilitating pain. He reports his hip is improving with the rotational exercises. When he does get pain, it starts in the back and wraps to the side of the hip and the groin.   He may notice some improvement in numbness. OBJECTIVE:    Observation: restriction TFL, old incision, VL   Test measurements:  + Silver B: R>L- good quad length R; deficits in prone hip IR compared to L; standing hip ER deficits    RESTRICTIONS/PRECAUTIONS:  HTN, OA    Exercises/Interventions:     Therapeutic Ex (50685) Sets/sec Reps Notes/CUES   Pt ed: posture with new chair, hip motor control without lumbar compensation 8 min     Quad stretch on chair  4 x :30           SL ABD  Prone hip ext with glut firing ed x10  x10 R/L                 Supine SKTC flexion x20     Heel raise on decline   Mod range   KDL  x10 HHA  x5 without assist  x10 7# HHA  with table for balance    gastroc KDL     4 in step post reach  Minimal knee flexion to limit knee pain         Seated hip flexion       Manual Intervention (85174)      STW plantar arch   IASTM   CFM HF/RF CFM 5 min/5 min     Hip flexion  With inf glide 2x10     hypervolt    TFL, VL, RF, incision   L SL ADD \"Stick\"  L Passive prone stretch IR stretch   L MRE prone IR activation 4 min  :20x4  x10 (10-35 ER)               NMR re-education (85866)   CUES NEEDED   Prone IR iso  Prone IR iso contralateral LE resist       Cues to limit pelvic rotation   Prone IR/ER MRE ecc  2x12 R lengthening into ER  3x10 lengthening into IR     Bosu ATW x5 R/L  Heels at small [place]   Seated hip flexion self inf glide mob 3x10     Step up with rotation YTB for knee RNT 2x10 YTB    RXX, RWX, LXX chest pass rebounder     airex R tandem stance chest pass           Bosu 1/2 foam roll MB chop/lift     B 1/2 roll R tandem MB chop/lift     Tandem stance 1/2 rolls      Lateral mini lunge x10 R/L     Ant lunge iso D.D     D. D kickstand hip hinge                 Therapeutic Exercise and NMR EXR  [] (13145) Provided verbal/tactile cueing for activities related to strengthening, flexibility, endurance, ROM for improvements in LE, proximal hip, and core control with self care, mobility, lifting, ambulation.  [] (97006) Provided verbal/tactile cueing for activities related to improving balance, coordination, kinesthetic sense, posture, motor skill, proprioception  to assist with LE, proximal hip, and core control in self care, mobility, lifting, ambulation and eccentric single leg control. NMR and Therapeutic Activities:    [] (69150 or 53659) Provided verbal/tactile cueing for activities related to improving balance, coordination, kinesthetic sense, posture, motor skill, proprioception and motor activation to allow for proper function of core, proximal hip and LE with self care and ADLs  [] (13181) Gait Re-education- Provided training and instruction to the patient for proper LE, core and proximal hip recruitment and positioning and eccentric body weight control with ambulation re-education including up and down stairs     Home Exercise Program:    [x] (07647) Reviewed/Progressed HEP activities related to strengthening, flexibility, endurance, ROM of core, proximal hip and LE for functional self-care, mobility, lifting and ambulation/stair navigation   [] (25160)Reviewed/Progressed HEP activities related to improving balance, coordination, kinesthetic sense, posture, motor skill, proprioception of core, proximal hip and LE for self care, mobility, lifting, and ambulation/stair navigation      Manual Treatments:  PROM / STM / Oscillations-Mobs:  G-I, II, III, IV (PA's, Inf., Post.)  [x] (47155) Provided manual therapy to mobilize LE, proximal hip and/or LS spine soft tissue/joints for the purpose of modulating pain, promoting relaxation,  increasing ROM, reducing/eliminating soft tissue swelling/inflammation/restriction, improving soft tissue extensibility and allowing for proper ROM for normal function with self care, mobility, lifting and ambulation. Modalities:     [] GAME READY (VASO)- for significant edema, swelling, pain control.      Charges:  Timed Code Treatment Minutes: 55   Total Treatment Minutes: 14 5272 [facility] time in/time out:   (and requires time in and out for each CPT code)    [] EVAL (LOW) 63306   [] EVAL (MOD) 13252  [] EVAL (HIGH) 91564   [] RE-EVAL     [x] IJ(40233) x  1  [] IONTO  [x] NMR (79142) x  2   [] VASO  [x] Manual (46252) x 1    [] Other:  [] TA x      [] Mech Traction (32391)  [] ES(attended) (28519)      [] ES (un) (89767):       GOALS:     Long Term Goals: To be achieved in: 6 weeks  1. Disability index score of 30% or less for the LEFS to assist with reaching prior level of function. [x] Progressing: [] Met: [] Not Met: [] Adjusted  2. Patient will demonstrate an increase in Strength to good proximal hip and core activation to allow for proper functional mobility as indicated by patients Functional Deficits. [x] Progressing: [] Met: [] Not Met: [] Adjusted  3. Patient will return to stair functional activities without increased symptoms or restriction. [x] Progressing: able to PF almost to normal during stair navigation- continues to be apprehensive about not using HR [] Met: [] Not Met: [] Adjusted  4. Pt will return to golf. [] Progressing: [x] Met: [] Not Met: [] Adjusted           Overall Progression Towards Functional goals/ Treatment Progress Update:  [x] Patient is progressing as expected towards functional goals listed. [] Progression is slowed due to complexities/Impairments listed. [] Progression has been slowed due to co-morbidities.   [] Plan just implemented, too soon to assess goals progression <30days   [] Goals require adjustment due to lack of progress  [] Patient is not progressing as expected and requires additional follow up with physician  [] Other    Prognosis for POC: [x] Good [] Fair  [] Poor      Patient requires continued skilled intervention: [x] Yes  [] No    Treatment/Activity Tolerance:  [x] Patient able to complete treatment  [] Patient limited by fatigue  [] Patient limited by pain    [] Patient limited by other medical complications  [] Other:     ASSESSMENT:  Pt with improvement in ROM in prone and supine/  Hip flexion restriction noted with anterior hip tightness ~95 degrees. Continue to improved joint mobility B and hip control in transverse plane/  Return to Play: (if applicable)   []  Stage 1: Intro to Strength   []  Stage 2: Return to Run and Strength   []  Stage 3: Return to Jump and Strength   []  Stage 4: Dynamic Strength and Agility   []  Stage 5: Sport Specific Training     []  Ready to Return to Play, Meets All Above Stages   []  Not Ready for Return to Sports   Comments:                               PLAN: See eval  [x] Continue per plan of care: 1x/ every other week 6 weeks [] Alter current plan (see comments above)  [] Plan of care initiated [] Hold pending MD visit [] Discharge      Electronically signed by:  Jose Miguel Wilkins, PT , DPT, CDNT         Note: If patient does not return for scheduled/ recommended follow up visits, this note will serve as a discharge from care along with most recent update on progress.

## 2021-08-24 ENCOUNTER — HOSPITAL ENCOUNTER (OUTPATIENT)
Dept: PHYSICAL THERAPY | Age: 51
Setting detail: THERAPIES SERIES
Discharge: HOME | End: 2021-08-24
Payer: COMMERCIAL

## 2021-08-24 NOTE — FLOWSHEET NOTE
The [address], [address], [address] [place], [place]      Physical Therapy  Cancellation/No-show Note  Patient Name:  Shahriar Church  :  1970   Date:  2021  Cancelled visits to date: 2  No-shows to date: 0    For today's appointment patient:  [x]  Cancelled  []  Rescheduled appointment  []  No-show     Reason given by patient:  []  Patient ill  []  Conflicting appointment  []  No transportation    [x]  Conflict with work  []  No reason given  []  Other:     Comments:      Electronically signed by:  Sydnie Cyr PT

## 2021-08-31 ENCOUNTER — HOSPITAL ENCOUNTER (OUTPATIENT)
Dept: PHYSICAL THERAPY | Age: 51
Setting detail: THERAPIES SERIES
Discharge: HOME | End: 2021-08-31
Payer: COMMERCIAL

## 2021-08-31 PROCEDURE — 97110 THERAPEUTIC EXERCISES: CPT | Performed by: PHYSICAL THERAPIST

## 2021-08-31 PROCEDURE — 97140 MANUAL THERAPY 1/> REGIONS: CPT | Performed by: PHYSICAL THERAPIST

## 2021-08-31 PROCEDURE — 97112 NEUROMUSCULAR REEDUCATION: CPT | Performed by: PHYSICAL THERAPIST

## 2021-08-31 NOTE — FLOWSHEET NOTE
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  ·  Treatment Diagnosis: R ankle pain R05.214  Insurance/Certification information:     Physician Information:  Referring Practitioner: Estephanie Navas  Has the plan of care been signed (Y/N):        []  Yes  [x]  No       Is this a Progress Report:     []  Yes  [x]  No        If Yes:  Date Range for reporting period:  Beginning 2021  Ending 2021    Progress report will be due (10 Rx or 30 days whichever is less):        Recertification will be due (POC Duration  / 90 days whichever is less): 2021      Visit # Insurance Allowable Auth Required   In-person   []  Yes []  No    Telehealth   []  Yes []  No    Total             Initial Initial Current   VAS 2       LEF 41%   40%   ROM LEFT RIGHT     hip   R 95 with anterior tightness   Ankle DF 5 5     Functional DF 5.5 cm 5.5 cm L 13 cm, R 12.5 cm     prone IR  Prone ER     L 20, R 35  WFL, R 55    Ankle Ev         Strength  LEFT RIGHT     Ankle DF   5  5    Ankle PF   SL HR 2 in from ground  x4    Ankle Inv   4+  4+    Ankle EV   4+ 5   Prone IR/seated   L 4, R 4+/4/4+   Prone ER/seated   L 4+, R 4/4+/4     See below for hip assessment    Number of Comorbidities:  []0     [x]1-2    []3+    Latex Allergy:  [x]NO      []YES  Preferred Language for Healthcare:   [x]English       []other:    SUBJECTIVE:  Golfed 27 holes because his foot was feeling good from a new arch support. His back/hip/nerve flared up as he expected.     OBJECTIVE:    Observation: restriction TFL, old incision, VL   Test measurements:  + Silver B: R>L- good quad length R; deficits in prone hip IR compared to L; standing hip ER deficits    RESTRICTIONS/PRECAUTIONS:  HTN, OA    Exercises/Interventions:     Therapeutic Ex (06632) Sets/sec Reps Notes/CUES   Pt ed: posture with new chair, hip motor control without lumbar compensation 8 min     Quad stretch on chair  4 x :30           SL ABD  Prone hip ext with glut firing ed x10  x10 R/L                 Supine SKTC flexion x20     Heel raise on decline   Mod range   KDL  x10 HHA  x5 without assist  x10 7# HHA  with table for balance    gastroc KDL     4 in step post reach  Minimal knee flexion to limit knee pain         Standing HF 20# inf glide 2x10  Quad fatigue   Manual Intervention (86007)      STW plantar arch   IASTM   CFM HF/RF CFM 5 min/5 min     Hip flexion  With inf glide 2x10     hypervolt    TFL, VL, RF, incision   L SL ADD \"Stick\"  L Passive prone stretch IR stretch   L MRE prone IR activation                NMR re-education (64127)   CUES NEEDED   Prone IR iso  Prone IR iso contralateral LE resist       Cues to limit pelvic rotation   Prone IR/ER MRE ecc  NV2x12 R lengthening into ER  3x10 lengthening into IR     Bosu ATW x5 R/L  Heels at small [place]   Kneeling rev lean 2x10  Quad lengtheing   Seated hip flexion self inf glide mob 3x10     Step up with rotation YTB for knee RNT YTB    XWS, RWX, LXX chest pass rebounder     airex R tandem stance chest pass           Bosu 1/2 foam roll MB chop/lift     B 1/2 roll R tandem MB chop/lift     Tandem stance 1/2 rolls      Lateral mini lunge x10 R/L     Ant lunge iso D.D     D. D kickstand hip hinge                 Therapeutic Exercise and NMR EXR  [] (34051) Provided verbal/tactile cueing for activities related to strengthening, flexibility, endurance, ROM for improvements in LE, proximal hip, and core control with self care, mobility, lifting, ambulation.  [] (77570) Provided verbal/tactile cueing for activities related to improving balance, coordination, kinesthetic sense, posture, motor skill, proprioception  to assist with LE, proximal hip, and core control in self care, mobility, lifting, ambulation and eccentric single leg control. NMR and Therapeutic Activities:    [] (55040 or 09792) Provided verbal/tactile cueing for activities related to improving balance, coordination, kinesthetic sense, posture, motor skill, proprioception and motor activation to allow for proper function of core, proximal hip and LE with self care and ADLs  [] (73174) Gait Re-education- Provided training and instruction to the patient for proper LE, core and proximal hip recruitment and positioning and eccentric body weight control with ambulation re-education including up and down stairs     Home Exercise Program:    [x] (00533) Reviewed/Progressed HEP activities related to strengthening, flexibility, endurance, ROM of core, proximal hip and LE for functional self-care, mobility, lifting and ambulation/stair navigation   [] (96131)Reviewed/Progressed HEP activities related to improving balance, coordination, kinesthetic sense, posture, motor skill, proprioception of core, proximal hip and LE for self care, mobility, lifting, and ambulation/stair navigation      Manual Treatments:  PROM / STM / Oscillations-Mobs:  G-I, II, III, IV (PA's, Inf., Post.)  [x] (25123) Provided manual therapy to mobilize LE, proximal hip and/or LS spine soft tissue/joints for the purpose of modulating pain, promoting relaxation,  increasing ROM, reducing/eliminating soft tissue swelling/inflammation/restriction, improving soft tissue extensibility and allowing for proper ROM for normal function with self care, mobility, lifting and ambulation. Modalities:     [] GAME READY (VASO)- for significant edema, swelling, pain control.      Charges:  Timed Code Treatment Minutes: 40   Total Treatment Minutes: 40     BWC time in/time out:   (and requires time in and out for each CPT code)    [] EVAL (LOW) 247 [id number]   [] EVAL (MOD) 97665  [] EVAL (HIGH) 97576   [] RE-EVAL     [x] VB(14595) x  1  [] IONTO  [x] NMR (20231) x  1   [] VASO  [x] Manual (10737) x 1    [] Other:  [] TA x      [] Mech Traction (47516)  [] ES(attended) (56825)      [] ES (un) (27281):       GOALS:     Long Term Goals: To be achieved in: 6 weeks  1. Disability index score of 30% or less for the LEFS to assist with reaching prior level of function. [x] Progressing: [] Met: [] Not Met: [] Adjusted  2. Patient will demonstrate an increase in Strength to good proximal hip and core activation to allow for proper functional mobility as indicated by patients Functional Deficits. [x] Progressing: [] Met: [] Not Met: [] Adjusted  3. Patient will return to stair functional activities without increased symptoms or restriction. [x] Progressing: able to PF almost to normal during stair navigation- continues to be apprehensive about not using HR [] Met: [] Not Met: [] Adjusted  4. Pt will return to golf. [] Progressing: [x] Met: [] Not Met: [] Adjusted           Overall Progression Towards Functional goals/ Treatment Progress Update:  [x] Patient is progressing as expected towards functional goals listed. [] Progression is slowed due to complexities/Impairments listed. [] Progression has been slowed due to co-morbidities. [] Plan just implemented, too soon to assess goals progression <30days   [] Goals require adjustment due to lack of progress  [] Patient is not progressing as expected and requires additional follow up with physician  [] Other    Prognosis for POC: [x] Good [] Fair  [] Poor      Patient requires continued skilled intervention: [x] Yes  [] No    Treatment/Activity Tolerance:  [x] Patient able to complete treatment  [] Patient limited by fatigue  [] Patient limited by pain    [] Patient limited by other medical complications  [] Other:     ASSESSMENT: iliacus tension and hip flexion stiffness improved with manuals.   Fatigue in quad after 6 reps on TM lunge with inf glide  Return to Play: (if applicable)   []  Stage 1: Intro to Strength   []  Stage 2: Return to Run and Strength   []  Stage 3: Return to Jump and Strength   []  Stage 4: Dynamic Strength and Agility   []  Stage 5: Sport Specific Training     []  Ready to Return to Play, Meets All Above Stages   []  Not Ready for Return to Sports   Comments:                               PLAN: See eval  [x] Continue per plan of care: 1x/ every other week 6 weeks [] Alter current plan (see comments above)  [] Plan of care initiated [] Hold pending MD visit [] Discharge      Electronically signed by:  Robyn Church, PT , DPT, CDNT         Note: If patient does not return for scheduled/ recommended follow up visits, this note will serve as a discharge from care along with most recent update on progress.

## 2021-09-21 ENCOUNTER — HOSPITAL ENCOUNTER (OUTPATIENT)
Dept: PHYSICAL THERAPY | Age: 51
Setting detail: THERAPIES SERIES
Discharge: HOME | End: 2021-09-21
Payer: COMMERCIAL

## 2021-09-21 PROCEDURE — 97110 THERAPEUTIC EXERCISES: CPT | Performed by: PHYSICAL THERAPIST

## 2021-09-21 PROCEDURE — 97112 NEUROMUSCULAR REEDUCATION: CPT | Performed by: PHYSICAL THERAPIST

## 2021-09-21 PROCEDURE — 97140 MANUAL THERAPY 1/> REGIONS: CPT | Performed by: PHYSICAL THERAPIST

## 2021-09-21 NOTE — FLOWSHEET NOTE
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 Estephanie Navas,  The following patient has been evaluated for physical therapy services and for therapy to continue, Medicare requires monthly physician review of the treatment plan. Please review the attached evaluation and/or summary of the patient's plan of care, and verify that you agree therapy should continue by signing the attached document and sending it back to our office.     Plan of Care/Treatment to date:  [x]? Therapeutic Exercise                     []? Modalities:  []? Therapeutic Activity                                   []? Ultrasound              []? Electric Stimulation  []? Gait Training                                              []? Cervical Traction    []? Lumbar Traction  [x]? Neuromuscular Re-education                    []? Cold/hotpack          []? Iontophoresis           [x]? Instruction in HEP                          Other:  []? Manual Therapy                                         []?             []? Aquatic Therapy                                         []?           ?                        Frequency/Duration:  # Days per week:       [x]? 1 day every other week      # Weeks:        []? 1 week        []? 7 weeks                                      []? 2 days?                                []? 2 weeks []? 8 weeks                                      []? 3 days                                 []? 3 weeks      []? 9 weeks                                      []? 4 days                                 []? 4 weeks      []? 10 weeks                                      []? 5 days                                 []? 5 weeks      []? 11 weeks                                                                                          [x]? 6 weeks      []? 12 weeks        Rehab Potential:        []? Excellent     []? Good          []? Fair             []? Poor                    Electronically signed by:  Maria Luisa Finnegan, PT, DPT           If you have any questions or concerns, please don't hesitate to call.   Thank you for your referral.        Physician Signature:________________________________Date:__________________  By signing above, therapists plan is approved by physician      Physical Therapy Treatment Note/ Progress Report:           Date:  2021    Patient Name:  Jimmye Crigler    :  1970  MRN: [id number]  Restrictions/Precautions:    Medical/Treatment Diagnosis Information:  · Diagnosis: M89.9, 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  ·  Treatment Diagnosis: R ankle pain A16.273  Insurance/Certification information:     Physician Information:  Referring Practitioner: Benjy Combs  Has the plan of care been signed (Y/N):        []  Yes  [x]  No       Is this a Progress Report:     []  Yes  [x]  No        If Yes:  Date Range for reporting period:  Beginning 2021  Ending 2021    Progress report will be due (10 Rx or 30 days whichever is less):        Recertification will be due (POC Duration  / 90 days whichever is less): 2021      Visit # Insurance Allowable Auth Required   In-person   []  Yes []  No    Telehealth   []  Yes []  No    Total             Initial Initial Current   VAS 2       LEF 41%   40%   ROM LEFT RIGHT     hip   R 95 with anterior tightness   Ankle DF 5 5     Functional DF 5.5 cm 5.5 cm L 13 cm, R 12.5 cm     prone IR  Prone ER     L 20, R 35  WFL, R 55    Ankle Ev         Strength  LEFT RIGHT     Ankle DF   5  5    Ankle PF   SL HR 2 in from ground  x4    Ankle Inv   4+  4+    Ankle EV   4+ 5   Prone IR/seated   L 4, R 4+/4+ B   Prone ER/seated   L 4+, R 4/ 4+ B     See below for hip assessment    Number of Comorbidities:  []0     [x]1-2    []3+    Latex Allergy:  [x]NO      []YES  Preferred Language for Healthcare:   [x]English       []other:    SUBJECTIVE:  Arch swelling comes with walking too much. Able to go down stairs without much issue.   OBJECTIVE:    Observation: restriction TFL, old incision, VL   Test measurements:  + Silver B: R>L- good quad length R; deficits in prone hip IR compared to L; standing hip ER deficits    RESTRICTIONS/PRECAUTIONS:  HTN, OA    Exercises/Interventions:     Therapeutic Ex (33150) Sets/sec Reps Notes/CUES   Pt ed: posture with new chair, hip motor control without lumbar compensation 8 min     Quad stretch on chair  4 x :30           SL ABD  Prone hip ext with glut firing ed                  Supine SKTC flexion x20     Heel raise on decline   Mod range   KDL  x10 HHA  x5 without assist  x10 7# HHA  with table for balance    gastroc KDL     4 in step post reach  Minimal knee flexion to limit knee pain         Standing HF 20# inf glide 2x10  Quad fatigue   Manual Intervention (17562)      Lateral HS release 3min x3     CFM HF/RF CFM 5 min/5 min     Hip flexion  With inf glide      hypervolt    TFL, VL, RF, incision   L SL ADD \"Stick\"  L Passive prone stretch IR stretch   L MRE prone IR activation                NMR re-education (45573)   CUES NEEDED   Prone ADD + hip IR iso pillow under hips x20  OVL   Prone SL HSC + YTB lat hip iso 2x10 R/L  Pillow under hips   Prone IR/ER MRE ecc  2x12 R lengthening into ER  3  Post lateral HS STW   Bosu ATW x5 R/L  Heels at small [place]   Kneeling rev lean 2x10  Quad lengtheing   Seated hip flexion self inf Oscillations-Mobs:  G-I, II, III, IV (PA's, Inf., Post.)  [x] (37326) Provided manual therapy to mobilize LE, proximal hip and/or LS spine soft tissue/joints for the purpose of modulating pain, promoting relaxation,  increasing ROM, reducing/eliminating soft tissue swelling/inflammation/restriction, improving soft tissue extensibility and allowing for proper ROM for normal function with self care, mobility, lifting and ambulation. Modalities:     [] GAME READY (VASO)- for significant edema, swelling, pain control. Charges:  Timed Code Treatment Minutes: 40   Total Treatment Minutes: 40     BWC time in/time out:   (and requires time in and out for each CPT code)    [] EVAL (LOW) 91343   [] EVAL (MOD) 58847  [] EVAL (HIGH) 42200   [] RE-EVAL     [x] MR(67997) x  1  [] IONTO  [x] NMR (11444) x  1   [] VASO  [x] Manual (78022) x 1    [] Other:  [] TA x      [] Mech Traction (84531)  [] ES(attended) (80114)      [] ES (un) (35087):       GOALS:     Long Term Goals: To be achieved in: 6 weeks  1. Disability index score of 30% or less for the LEFS to assist with reaching prior level of function. [x] Progressing: [] Met: [] Not Met: [] Adjusted  2. Patient will demonstrate an increase in Strength to good proximal hip and core activation to allow for proper functional mobility as indicated by patients Functional Deficits. [x] Progressing: [] Met: [] Not Met: [] Adjusted  3. Patient will return to stair functional activities without increased symptoms or restriction. [] Progressing:  [x] Met: [] Not Met: [] Adjusted  4. Pt will return to golf. [] Progressing: [x] Met: [] Not Met: [] Adjusted         Overall Progression Towards Functional goals/ Treatment Progress Update:  [x] Patient is progressing as expected towards functional goals listed. [] Progression is slowed due to complexities/Impairments listed. [] Progression has been slowed due to co-morbidities.   [] Plan just implemented, too soon to assess goals progression <30days   [] Goals require adjustment due to lack of progress  [] Patient is not progressing as expected and requires additional follow up with physician  [] Other    Prognosis for POC: [x] Good [] Fair  [] Poor      Patient requires continued skilled intervention: [x] Yes  [] No    Treatment/Activity Tolerance:  [x] Patient able to complete treatment  [] Patient limited by fatigue  [] Patient limited by pain    [] Patient limited by other medical complications  [] Other:     ASSESSMENT:Continues to respond well to deep rotator activation. Able to tolerate prone [address] work with deep rotator bias without lumbar compensation. Foot and ankle pain improving with improvement in hip stability. Return to Play: (if applicable)   []  Stage 1: Intro to Strength   []  Stage 2: Return to Run and Strength   []  Stage 3: Return to Jump and Strength   []  Stage 4: Dynamic Strength and Agility   []  Stage 5: Sport Specific Training     []  Ready to Return to Play, Meets All Above Stages   []  Not Ready for Return to Sports   Comments:                               PLAN: See eval  [x] Continue per plan of care: 1x/ every other week 6 weeks [] Alter current plan (see comments above)  [] Plan of care initiated [] Hold pending MD visit [] Discharge      Electronically signed by:  Maria Luisa Finnegan, PT , DPT, CDNT         Note: If patient does not return for scheduled/ recommended follow up visits, this note will serve as a discharge from care along with most recent update on progress.

## 2021-10-07 ENCOUNTER — HOSPITAL ENCOUNTER (OUTPATIENT)
Dept: PHYSICAL THERAPY | Age: 51
Setting detail: THERAPIES SERIES
Discharge: HOME | End: 2021-10-07
Payer: COMMERCIAL

## 2021-10-07 PROCEDURE — 97110 THERAPEUTIC EXERCISES: CPT | Performed by: PHYSICAL THERAPIST

## 2021-10-07 PROCEDURE — 97112 NEUROMUSCULAR REEDUCATION: CPT | Performed by: PHYSICAL THERAPIST

## 2021-10-07 PROCEDURE — 97140 MANUAL THERAPY 1/> REGIONS: CPT | Performed by: PHYSICAL THERAPIST

## 2021-10-14 ENCOUNTER — HOSPITAL ENCOUNTER (OUTPATIENT)
Dept: PHYSICAL THERAPY | Age: 51
Setting detail: THERAPIES SERIES
Discharge: HOME | End: 2021-10-14

## 2021-10-14 NOTE — FLOWSHEET NOTE
[facility] [address] [facility], [address], [address] [place], [place]      Physical Therapy  Cancellation/No-show Note  Patient Name:  Dinesh Astorga  :  1970   Date:  10/14/2021  Cancelled visits to date: 3  No-shows to date: 0    For today's appointment patient:  [x]  Cancelled  []  Rescheduled appointment  []  No-show     Reason given by patient:  []  Patient ill  []  Conflicting appointment  []  No transportation    [x]  Conflict with work  []  No reason given  []  Other:     Comments:      Electronically signed by:  Jeremiah Ann PT

## 2021-10-21 ENCOUNTER — HOSPITAL ENCOUNTER (OUTPATIENT)
Dept: PHYSICAL THERAPY | Age: 51
Setting detail: THERAPIES SERIES
Discharge: HOME | End: 2021-10-21
Payer: COMMERCIAL

## 2021-10-21 PROCEDURE — 97110 THERAPEUTIC EXERCISES: CPT | Performed by: PHYSICAL THERAPIST

## 2021-10-21 PROCEDURE — 97140 MANUAL THERAPY 1/> REGIONS: CPT | Performed by: PHYSICAL THERAPIST

## 2021-10-21 NOTE — PLAN OF CARE
[] 2 days                                [] 2 weeks      [x] 8 weeks                                      [] 3 days                                [] 3 weeks      [] 9 weeks                                      [] 4 days                                [] 4 weeks      [] 10 weeks                                      [] 5 days                                [] 5 weeks      [] 11 weeks                                                                                          [] 6 weeks      [] 12 weeks        Rehab Potential:        [] Excellent    [x] Good          [] Fair             [] Poor                   Electronically signed by:  Waleska Leary, PT,    Physical Therapy Treatment Note/ Progress Report:           Date:  10/21/2021    Patient Name:  Perico So    :  1970  MRN: [id number]  Restrictions/Precautions:    Medical/Treatment Diagnosis Information:  · Diagnosis: 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, 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  ·  Treatment Diagnosis: R ankle pain X92.414  Insurance/Certification information:     Physician Information:  Referring Practitioner: Tana Carvajal  Has the plan of care been signed (Y/N):        []  Yes  [x]  No       Is this a Progress Report:     []  Yes  [x]  No        If Yes:  Date Range for reporting period:  Beginning 2021  Ending 10/21/2021    Progress report will be due (10 Rx or 30 days whichever is less):        Recertification will be due (POC Duration  / 90 days whichever is less): 2021      Visit # Insurance Allowable Auth Required   In-person   []  Yes []  No    Telehealth   []  Yes []  No    Total             Initial Initial Current   VAS 2       LEF 41%   34%   ROM LEFT RIGHT     hip   R 95 with anterior tightness   Ankle DF 5 5     Functional DF 5.5 cm 5.5 cm L 14.5 cm, R 14 cm     prone IR  Prone ER     L 20, R 35  WFL, R 55    Ankle Ev         Strength  LEFT RIGHT     Ankle DF   5  5    Ankle PF   SL HR 2 in from ground  x4 Ankle Inv   4+  4+    Ankle EV   4+ 4+   Prone IR/seated   L 4, R 4+/4+ B   Prone ER/seated   L 4+, R 4/ 4+ B   HF   4     See below for hip assessment    Number of Comorbidities:  []0     [x]1-2    []3+    Latex Allergy:  [x]NO      []YES  Preferred Language for Healthcare:   [x]English       []other:    SUBJECTIVE: Hardly thinking about stair navigation. Pleased with ankle progress. Stability training helping. Numbness in lateral leg has slightly improved. Hip feels stronger and more stable. Able to walk more without irritation. Still some weakness noted in lifting leg.   OBJECTIVE:    Observation:    Test measurements:      RESTRICTIONS/PRECAUTIONS:  HTN, OA    Exercises/Interventions:     Therapeutic Ex (14599) Sets/sec Reps Notes/CUES   Pt ed: posture with new chair, hip motor control without lumbar compensation 8 min     Quad stretch on chair  4 x :30     90-90 hip ER/ABD at wall :5x5 R/L  Pillow at hips and wall   SL ABD  Prone hip ext with glut firing ed                 Supine SKTC flexion x20     KDL with SB on table x10 R/L           gastroc KDL                Standing HF 20# inf glide HEP  Quad fatigue   Manual Intervention (30370)      Lateral HS release      CFM HF/RF CFM 5 min/5 min     Hip flexion  With inf glide     hypervolt   TFL, VL, RF, incision   L SL ADD \"Stick\"  L Passive prone stretch IR stretch   L MRE prone IR activation               NMR re-education (01702)   CUES NEEDED   Prone ADD + hip IR iso pillow under hips x20  OVL   Prone SL HSC + YTB lat hip iso 2x10 R/L  Pillow under hips   Prone IR/ER MRE ecc 2x12 R lengthening into ER  3 Post lateral HS STW   Bosu ATW x5 R/L  Heels at small [place]   Kneeling rev lean  Quad lengtheing   Seated hip flexion self inf glide mob 3x10     Step up with rotation YTB for knee RNT YTB    HHX, RWX, LXX chest pass rebounder     airex R tandem stance chest pass                TB ext with march  GTB   TB ext march YTB ABD, IR act  x5 each  YTB- preventing IR of hip during march                               Therapeutic Exercise and NMR EXR  [] (91358) Provided verbal/tactile cueing for activities related to strengthening, flexibility, endurance, ROM for improvements in LE, proximal hip, and core control with self care, mobility, lifting, ambulation.  [] (17682) Provided verbal/tactile cueing for activities related to improving balance, coordination, kinesthetic sense, posture, motor skill, proprioception  to assist with LE, proximal hip, and core control in self care, mobility, lifting, ambulation and eccentric single leg control.      NMR and Therapeutic Activities:    [] (95294 or 26823) Provided verbal/tactile cueing for activities related to improving balance, coordination, kinesthetic sense, posture, motor skill, proprioception and motor activation to allow for proper function of core, proximal hip and LE with self care and ADLs  [] (76209) Gait Re-education- Provided training and instruction to the patient for proper LE, core and proximal hip recruitment and positioning and eccentric body weight control with ambulation re-education including up and down stairs     Home Exercise Program:    [x] (00230) Reviewed/Progressed HEP activities related to strengthening, flexibility, endurance, ROM of core, proximal hip and LE for functional self-care, mobility, lifting and ambulation/stair navigation   [] (75015)Reviewed/Progressed HEP activities related to improving balance, coordination, kinesthetic sense, posture, motor skill, proprioception of core, proximal hip and LE for self care, mobility, lifting, and ambulation/stair navigation      Manual Treatments:  PROM / STM / Oscillations-Mobs:  G-I, II, III, IV (PA's, Inf., Post.)  [x] (86611) Provided manual therapy to mobilize LE, proximal hip and/or LS spine soft tissue/joints for the purpose of modulating pain, promoting relaxation,  increasing ROM, reducing/eliminating soft tissue swelling/inflammation/restriction, improving soft tissue extensibility and allowing for proper ROM for normal function with self care, mobility, lifting and ambulation. Modalities:     [] GAME READY (VASO)- for significant edema, swelling, pain control. Charges:  Timed Code Treatment Minutes: 40   Total Treatment Minutes: 40     BWC time in/time out:   (and requires time in and out for each CPT code)    [] EVAL (LOW) 80583   [] EVAL (MOD) 70960  [] EVAL (HIGH) 35023   [] RE-EVAL     [x] RU(29985) x  1  [] IONTO  [x] NMR (56459) x  1   [] VASO  [x] Manual (68415) x 1    [] Other:  [] TA x      [] Mech Traction (12300)  [] ES(attended) (29437)      [] ES (un) (71923):       GOALS:     Long Term Goals: To be achieved in: 6 weeks  1. Disability index score of 30% or less for the LEFS to assist with reaching prior level of function. [x] Progressing: [] Met: [] Not Met: [] Adjusted  2. Patient will demonstrate an increase in Strength to good proximal hip and core activation to allow for proper functional mobility as indicated by patients Functional Deficits. [x] Progressing: [] Met: [] Not Met: [] Adjusted  3. Patient will return to stair functional activities without increased symptoms or restriction. [] Progressing:  [x] Met: [] Not Met: [] Adjusted  4. Pt will return to golf. [] Progressing: [x] Met: [] Not Met: [] Adjusted         Overall Progression Towards Functional goals/ Treatment Progress Update:  [x] Patient is progressing as expected towards functional goals listed. [] Progression is slowed due to complexities/Impairments listed. [] Progression has been slowed due to co-morbidities.   [] Plan just implemented, too soon to assess goals progression <30days   [] Goals require adjustment due to lack of progress  [] Patient is not progressing as expected and requires additional follow up with physician  [] Other    Prognosis for POC: [x] Good [] Fair  [] Poor      Patient requires continued

## 2021-11-01 ENCOUNTER — HOSPITAL ENCOUNTER (OUTPATIENT)
Dept: PHYSICAL THERAPY | Age: 51
Setting detail: THERAPIES SERIES
Discharge: HOME | End: 2021-11-01
Payer: COMMERCIAL

## 2021-11-01 PROCEDURE — 97110 THERAPEUTIC EXERCISES: CPT | Performed by: PHYSICAL THERAPIST

## 2021-11-01 PROCEDURE — 97112 NEUROMUSCULAR REEDUCATION: CPT | Performed by: PHYSICAL THERAPIST

## 2021-11-01 PROCEDURE — 97140 MANUAL THERAPY 1/> REGIONS: CPT | Performed by: PHYSICAL THERAPIST

## 2021-11-01 NOTE — FLOWSHEET NOTE
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  ·  Treatment Diagnosis: R ankle pain S96.342  Insurance/Certification information:     Physician Information:  Referring Practitioner: Rudi Gonzalez  Has the plan of care been signed (Y/N):        []  Yes  [x]  No       Is this a Progress Report:     []  Yes  [x]  No        If Yes:  Date Range for reporting period:  Beginning 2021  Ending 10/21/2021    Progress report will be due (10 Rx or 30 days whichever is less): 52/10/9735       Recertification will be due (POC Duration  / 90 days whichever is less): 2021      Visit # Insurance Allowable Auth Required   In-person   []  Yes []  No    Telehealth   []  Yes []  No    Total             Initial Initial Current   VAS 2       LEF 41%   34%   ROM LEFT RIGHT     hip   R 95 with anterior tightness   Ankle DF 5 5     Functional DF 5.5 cm 5.5 cm L 14.5 cm, R 14 cm     prone IR  Prone ER     L 20, R 35  WFL, R 55    Ankle Ev         Strength  LEFT RIGHT     Ankle DF   5  5    Ankle PF   SL HR 2 in from ground  x4    Ankle Inv   4+  4+    Ankle EV   4+ 4+   Prone IR/seated   L 4, R 4+/4+ B   Prone ER/seated   L 4+, R 4/ 4+ B   HF   4     See below for hip assessment    Number of Comorbidities:  []0     [x]1-2    []3+    Latex Allergy:  [x]NO      []YES  Preferred Language for Healthcare:   [x]English       []other:    SUBJECTIVE: Tight in anterolateral hip.   OBJECTIVE:    Observation:    Test measurements:      RESTRICTIONS/PRECAUTIONS:  HTN, OA    Exercises/Interventions:     Therapeutic Ex () Sets/sec Reps Notes/CUES   Pt ed: posture with new chair, hip motor control without lumbar compensation 8 min     Quad stretch on chair  4 x :30     90-90 hip ER/ABD at wall :5x5 R/L  Pillow at hips and wall   SL ABD  Prone hip ext with glut firing ed     EMOM:   ecc HF lower off table  Prone ecc ER  Goblet squat 18 in box + 2 airex    3 rounds      35# 1 round  25# 2 rounds  3/10 RPE  + 2#         Supine SKTC flexion      EMOM:  Hip thrust off SB  KDL to 12 in box 3D  1/2 kneel chest press to overhead TB     NV        Progress to rev lunge         gastroc KDL                Standing HF 20# inf glide HEP  Quad fatigue   Manual Intervention (82685)      Active release glut med with SL ABD 5 spots along lateral hip 2 rounds     CFM HF/RF CFM 5 min/5 min     Hip flexion  With inf glide     hypervolt   TFL, VL, RF, incision   L SL ADD \"Stick\"  L Passive prone stretch IR stretch   L MRE prone IR activation               NMR re-education (85793)   CUES NEEDED   Prone ADD + hip IR iso pillow under hips  OVL   Prone SL HSC + YTB lat hip iso  Pillow under hips   Prone IR/ER MRE ecc  Post lateral HS STW   Bosu ATW  Heels at small [place]   Kneeling rev lean  Quad lengthening   Seated hip flexion self inf glide mob     Step up with rotation YTB for knee RNT YTB                        TB ext with march  GTB   TB ext march YTB ABD, IR act   YTB- preventing IR of hip during march                               Therapeutic Exercise and NMR EXR  [] (63741) Provided verbal/tactile cueing for activities related to strengthening, flexibility, endurance, ROM for improvements in LE, proximal hip, and core control with self care, mobility, lifting, ambulation.  [] (15823) Provided verbal/tactile cueing for activities related to improving balance, coordination, kinesthetic sense, posture, motor skill, proprioception  to assist with LE, proximal hip, and core control in self care, mobility, lifting, ambulation and eccentric single leg control.      NMR and Therapeutic Activities:    [] (71552 or 37209) (43737):       GOALS:     Long Term Goals: To be achieved in: 6 weeks  1. Disability index score of 30% or less for the LEFS to assist with reaching prior level of function. [x] Progressing: [] Met: [] Not Met: [] Adjusted  2. Patient will demonstrate an increase in Strength to good proximal hip and core activation to allow for proper functional mobility as indicated by patients Functional Deficits. [x] Progressing: [] Met: [] Not Met: [] Adjusted  3. Patient will return to stair functional activities without increased symptoms or restriction. [] Progressing:  [x] Met: [] Not Met: [] Adjusted  4. Pt will return to golf. [] Progressing: [x] Met: [] Not Met: [] Adjusted         Overall Progression Towards Functional goals/ Treatment Progress Update:  [x] Patient is progressing as expected towards functional goals listed. [] Progression is slowed due to complexities/Impairments listed. [] Progression has been slowed due to co-morbidities. [] Plan just implemented, too soon to assess goals progression <30days   [] Goals require adjustment due to lack of progress  [] Patient is not progressing as expected and requires additional follow up with physician  [] Other    Prognosis for POC: [x] Good [] Fair  [] Poor      Patient requires continued skilled intervention: [x] Yes  [] No    Treatment/Activity Tolerance:  [x] Patient able to complete treatment  [] Patient limited by fatigue  [] Patient limited by pain    [] Patient limited by other medical complications  [] Other:     ASSESSMENT: Cues to for goblet squat technique to limit R knee pain. Continue EMOM training to increase endurance and promote challenge to NMR control during fatigue,.   Return to Play: (if applicable)   []  Stage 1: Intro to Strength   []  Stage 2: Return to Run and Strength   []  Stage 3: Return to Jump and Strength   []  Stage 4: Dynamic Strength and Agility   []  Stage 5: Sport Specific Training     []  Ready to Return to Play, dooub All Above Stages   []  Not Ready for Return to Sports   Comments:                               PLAN: See eval  [x] Continue per plan of care: 1x/ every other week 8 weeks [] Alter current plan (see comments above)  [] Plan of care initiated [] Hold pending MD visit [] Discharge      Electronically signed by:  Nathalia Higginbotham PT , DPT, CDNT         Note: If patient does not return for scheduled/ recommended follow up visits, this note will serve as a discharge from care along with most recent update on progress.

## 2021-11-11 ENCOUNTER — HOSPITAL ENCOUNTER (OUTPATIENT)
Dept: PHYSICAL THERAPY | Age: 51
Setting detail: THERAPIES SERIES
Discharge: HOME | End: 2021-11-11
Payer: COMMERCIAL

## 2021-11-11 PROCEDURE — 97140 MANUAL THERAPY 1/> REGIONS: CPT | Performed by: PHYSICAL THERAPIST

## 2021-11-11 PROCEDURE — 97112 NEUROMUSCULAR REEDUCATION: CPT | Performed by: PHYSICAL THERAPIST

## 2021-11-11 PROCEDURE — 97110 THERAPEUTIC EXERCISES: CPT | Performed by: PHYSICAL THERAPIST

## 2021-11-11 NOTE — FLOWSHEET NOTE
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  ·  Treatment Diagnosis: R ankle pain G91.165  Insurance/Certification information:     Physician Information:  Referring Practitioner: Jh Sinclair  Has the plan of care been signed (Y/N):        []  Yes  [x]  No       Is this a Progress Report:     []  Yes  [x]  No        If Yes:  Date Range for reporting period:  Beginning 2021  Ending 10/21/2021    Progress report will be due (10 Rx or 30 days whichever is less):        Recertification will be due (POC Duration  / 90 days whichever is less): 2021      Visit # Insurance Allowable Auth Required   In-person   []  Yes []  No    Telehealth   []  Yes []  No    Total             Initial Initial Current   VAS 2       LEF 41%   34%   ROM LEFT RIGHT     hip   R 95 with anterior tightness   Ankle DF 5 5     Functional DF 5.5 cm 5.5 cm L 14.5 cm, R 14 cm     prone IR  Prone ER     L 20, R 35  WFL, R 55    Ankle Ev         Strength  LEFT RIGHT     Ankle DF   5  5    Ankle PF   SL HR 2 in from ground  x4    Ankle Inv   4+  4+    Ankle EV   4+ 4+   Prone IR/seated   L 4, R 4+/4+ B   Prone ER/seated   L 4+, R 4/ 4+ B   HF   4     See below for hip assessment    Number of Comorbidities:  []0     [x]1-2    []3+    Latex Allergy:  [x]NO      []YES  Preferred Language for Healthcare:   [x]English       []other:    SUBJECTIVE: Walked 10-15,000 steps 3-4 days without pain.   OBJECTIVE:    Observation:    Test measurements:      RESTRICTIONS/PRECAUTIONS:  HTN, OA    Exercises/Interventions:     Therapeutic Ex (41395) Sets/sec Reps Notes/CUES   Pt ed: posture with ES(attended) (68692)      [] ES (un) (95762):       GOALS:     Long Term Goals: To be achieved in: 6 weeks  1. Disability index score of 30% or less for the LEFS to assist with reaching prior level of function. [x] Progressing: [] Met: [] Not Met: [] Adjusted  2. Patient will demonstrate an increase in Strength to good proximal hip and core activation to allow for proper functional mobility as indicated by patients Functional Deficits. [x] Progressing: [] Met: [] Not Met: [] Adjusted  3. Patient will return to stair functional activities without increased symptoms or restriction. [] Progressing:  [x] Met: [] Not Met: [] Adjusted  4. Pt will return to golf. [] Progressing: [x] Met: [] Not Met: [] Adjusted         Overall Progression Towards Functional goals/ Treatment Progress Update:  [x] Patient is progressing as expected towards functional goals listed. [] Progression is slowed due to complexities/Impairments listed. [] Progression has been slowed due to co-morbidities. [] Plan just implemented, too soon to assess goals progression <30days   [] Goals require adjustment due to lack of progress  [] Patient is not progressing as expected and requires additional follow up with physician  [] Other    Prognosis for POC: [x] Good [] Fair  [] Poor      Patient requires continued skilled intervention: [x] Yes  [] No    Treatment/Activity Tolerance:  [x] Patient able to complete treatment  [] Patient limited by fatigue  [] Patient limited by pain    [] Patient limited by other medical complications  [] Other:     ASSESSMENT: Tolerated session well. Able to do goblet squats without pain.   Return to Play: (if applicable)   []  Stage 1: Intro to Strength   []  Stage 2: Return to Run and Strength   []  Stage 3: Return to Jump and Strength   []  Stage 4: Dynamic Strength and Agility   []  Stage 5: Sport Specific Training     []  Ready to Return to Play, Meets All Above Stages   []  Not Ready for Return to Sports   Comments:                               PLAN: See eval  [x] Continue per plan of care: 1x/ every other week 8 weeks [] Alter current plan (see comments above)  [] Plan of care initiated [] Hold pending MD visit [] Discharge      Electronically signed by:  Angle Medina, PT , DPT, CDNT         Note: If patient does not return for scheduled/ recommended follow up visits, this note will serve as a discharge from care along with most recent update on progress.

## 2021-11-18 ENCOUNTER — HOSPITAL ENCOUNTER (OUTPATIENT)
Dept: PHYSICAL THERAPY | Age: 51
Setting detail: THERAPIES SERIES
Discharge: HOME | End: 2021-11-18
Payer: COMMERCIAL

## 2021-11-18 PROCEDURE — 97140 MANUAL THERAPY 1/> REGIONS: CPT | Performed by: PHYSICAL THERAPIST

## 2021-11-18 PROCEDURE — 97110 THERAPEUTIC EXERCISES: CPT | Performed by: PHYSICAL THERAPIST

## 2021-11-18 NOTE — PLAN OF CARE
week:       [x]? ? 1 day   every 2 weeks       # QGUZF:        []? ? 1 week        []? ? 7 weeks                                      []? ? 2 days                                []? ? 2 weeks      []? ? 8 weeks                                      []? ? 3 days                                []? ? 3 weeks      []? ? 9 weeks                                      []? ? 4 days                                []? ? 4 weeks      []? ? 10 weeks                                      []? ? 5 days                                []? ? 5 weeks      []? ? 11 weeks                                                                                          [x]? ? 6 weeks      []? ? 12 weeks        Rehab Potential:        []? ? Excellent    [x]? ? Good          []? ? Fair             []? ? 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  ·  Treatment Diagnosis: R ankle pain D91.042  Insurance/Certification information:     Physician Information:  Referring Practitioner: Betsy Winkler  Has the plan of care been signed (Y/N):        []  Yes  [x]  No       Is this a Progress Report:     []  Yes  [x]  No        If Yes:  Date Range for reporting period:  Beginning 2021  Ending 2021    Progress report will be due (10 Rx or 30 days whichever is less):        Recertification will be due (POC Duration  / 90 days whichever is less): 2021      Visit # Insurance Allowable Auth Required   In-person   []  Yes []  No    Telehealth   []  Yes []  No    Total             Initial Initial Current   VAS 2       LEF 41%   34%   ROM LEFT RIGHT     hip   R 95 with anterior tightness   Ankle DF 5 5     Functional DF 5.5 cm 5.5 cm L 14.5 cm, R 14 cm     prone IR  Prone ER     L 20, R 35  WFL, R 55    Ankle Ev Strength  LEFT RIGHT     Ankle DF   5  5    Ankle PF   SL HR 2 in from ground  x4    Ankle Inv   4+  4+    Ankle EV   4+ 4+   Prone IR/seated   L 4, R 4+/4+ B   Prone ER/seated   L 4+, R 4/ 4+ B   HF   4     See below for hip assessment    Number of Comorbidities:  []0     [x]1-2    []3+    Latex Allergy:  [x]NO      []YES  Preferred Language for Healthcare:   [x]English       []other:    SUBJECTIVE: After last PT session, pt reports going to the gym working out on new machines, doing the elliptical, and going for a walk. He had pain in the front of the hip and into the back. Normal movements like doing laundry or shifting weight side to side while slightly flexed hurt. The past few days, he has just been cautious.   OBJECTIVE:    Observation:    Test measurements:      RESTRICTIONS/PRECAUTIONS:  HTN, OA    Exercises/Interventions:     Therapeutic Ex (99689) Sets/sec Reps Notes/CUES   Pt ed: posture with new chair, hip motor control without lumbar compensation     Quad stretch on chair      90-90 hip ER/ABD at wall :5x5 R/L  Pillow at hips and wall   SL ABD  Prone hip ext with glut firing ed     EMOM:   ecc HF lower off table  Elevated KDL :30 each  Goblet squat 18 in box + 2 airex       x15       3#   Quadruped rocking moving into IR  1/2 kneel chop  x8 each  x10 R/L    Double GTB   Supine SKTC flexion      EMOM:  Hip thrust off SB  KDL to 12 in box 3D  1/2 kneel chest press to overhead TB     NV        Progress to rev lunge         gastroc KDL                Standing HF 20# inf glide HEP  Quad fatigue   Manual Intervention (09880)      hypervolt glut, TFL, HF 8 min    Active release glut med with SL ABD     CFM HF/RF CFM     Hip flexion  With inf glide     hypervolt   TFL, VL, RF, incision   L SL ADD \"Stick\"  L Passive prone stretch IR stretch   L MRE prone IR activation               NMR re-education (19807)   CUES NEEDED   Prone ADD + hip IR iso pillow under hips  OVL   Prone SL HSC + YTB lat hip iso Manual Treatments:  PROM / STM / Oscillations-Mobs:  G-I, II, III, IV (PA's, Inf., Post.)  [x] (48673) Provided manual therapy to mobilize LE, proximal hip and/or LS spine soft tissue/joints for the purpose of modulating pain, promoting relaxation,  increasing ROM, reducing/eliminating soft tissue swelling/inflammation/restriction, improving soft tissue extensibility and allowing for proper ROM for normal function with self care, mobility, lifting and ambulation. Modalities:     [] GAME READY (VASO)- for significant edema, swelling, pain control. Charges:  Timed Code Treatment Minutes: 40   Total Treatment Minutes: 40     BWC time in/time out:   (and requires time in and out for each CPT code)    [] EVAL (LOW) 46960   [] EVAL (MOD) 48989  [] EVAL (HIGH) 13913   [] RE-EVAL     [x] GY(50945) x  2  [] IONTO  [] NMR (39836) x    [] VASO  [x] Manual (30642) x 1    [] Other:  [] TA x      [] Mech Traction (49941)  [] ES(attended) (65656)      [] ES (un) (74310):       GOALS:     Long Term Goals: To be achieved in: 6 weeks  1. Disability index score of 30% or less for the LEFS to assist with reaching prior level of function. [x] Progressing: [] Met: [] Not Met: [] Adjusted  2. Patient will demonstrate an increase in Strength to good proximal hip and core activation to allow for proper functional mobility as indicated by patients Functional Deficits. [x] Progressing: [] Met: [] Not Met: [] Adjusted  3. Patient will return to stair functional activities without increased symptoms or restriction. [] Progressing:  [x] Met: [] Not Met: [] Adjusted  4. Pt will return to golf. [] Progressing: [x] Met: [] Not Met: [] Adjusted         Overall Progression Towards Functional goals/ Treatment Progress Update:  [x] Patient is progressing as expected towards functional goals listed. [] Progression is slowed due to complexities/Impairments listed. [] Progression has been slowed due to co-morbidities.   [] Plan just implemented, too soon to assess goals progression <30days   [] Goals require adjustment due to lack of progress  [] Patient is not progressing as expected and requires additional follow up with physician  [] Other    Prognosis for POC: [x] Good [] Fair  [] Poor      Patient requires continued skilled intervention: [x] Yes  [] No    Treatment/Activity Tolerance:  [x] Patient able to complete treatment  [] Patient limited by fatigue  [] Patient limited by pain    [] Patient limited by other medical complications  [] Other:     ASSESSMENT: Max education to pt on exercise progression, volume, load and response of tissue to new stimulus. Likely fatigued hip musculature contributing to anterior hip and low back irritation. Pt will benefit from continued PT to safely develop and transition to gym routine with appropriate volume of exercise and resistant load. Return to Play: (if applicable)   []  Stage 1: Intro to Strength   []  Stage 2: Return to Run and Strength   []  Stage 3: Return to Jump and Strength   []  Stage 4: Dynamic Strength and Agility   []  Stage 5: Sport Specific Training     []  Ready to Return to Play, Meets All Above Stages   []  Not Ready for Return to Sports   Comments:                               PLAN: See eval  [x] Continue per plan of care: 1x/ every other week 6 weeks [] Alter current plan (see comments above)  [] Plan of care initiated [] Hold pending MD visit [] Discharge      Electronically signed by:  Denise Jordan PT , DPT, CDNT         Note: If patient does not return for scheduled/ recommended follow up visits, this note will serve as a discharge from care along with most recent update on progress.

## 2021-12-07 ENCOUNTER — HOSPITAL ENCOUNTER (OUTPATIENT)
Dept: PHYSICAL THERAPY | Age: 51
Setting detail: THERAPIES SERIES
Discharge: HOME | End: 2021-12-07
Payer: COMMERCIAL

## 2021-12-07 PROCEDURE — 97110 THERAPEUTIC EXERCISES: CPT | Performed by: PHYSICAL THERAPIST

## 2021-12-07 PROCEDURE — 97112 NEUROMUSCULAR REEDUCATION: CPT | Performed by: PHYSICAL THERAPIST

## 2021-12-07 PROCEDURE — 97140 MANUAL THERAPY 1/> REGIONS: CPT | Performed by: PHYSICAL THERAPIST

## 2021-12-07 NOTE — PLAN OF CARE
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  ·  Treatment Diagnosis: R ankle pain B47.249  Insurance/Certification information:     Physician Information:  Referring Practitioner: Jesika Barnes  Has the plan of care been signed (Y/N):        []  Yes  [x]  No       Is this a Progress Report:     []  Yes  [x]  No        If Yes:  Date Range for reporting period:  Beginning 2021  Ending 2021    Progress report will be due (10 Rx or 30 days whichever is less):        Recertification will be due (POC Duration  / 90 days whichever is less): 2021      Visit # Insurance Allowable Auth Required   In-person   []  Yes []  No    Telehealth   []  Yes []  No    Total             Initial Initial Current   VAS 2       LEF 41%   34%   ROM LEFT RIGHT     hip   R 95 with anterior tightness   Ankle DF 5 5     Functional DF 5.5 cm 5.5 cm L 14.5 cm, R 14 cm     prone IR  Prone ER     L 20, R 35  WFL, R 55    Ankle Ev         Strength  LEFT RIGHT     Ankle DF   5  5    Ankle PF   SL HR 2 in from ground  x4    Ankle Inv   4+  4+    Ankle EV   4+ 4+   Prone IR/seated   L 4, R 4+/4+ B   Prone ER/seated   L 4+, R 4/ 4+ B   HF   4     See below for hip assessment    Number of Comorbidities:  []0     [x]1-2    []3+    Latex Allergy:  [x]NO      []YES  Preferred Language for Healthcare:   [x]English       []other:    SUBJECTIVE: Able to walk more at work without pain.   OBJECTIVE:    Observation:    Test measurements:      RESTRICTIONS/PRECAUTIONS:  HTN, OA    Exercises/Interventions:     Therapeutic Ex (98924) Sets/sec Reps Notes/CUES   Pt ed: posture with new chair, hip motor control without lumbar compensation Quad stretch on chair      90-90 hip ER/ABD at wall :5x5 R/L  Pillow at hips and wall   SL ABD  Prone hip ext with glut firing ed     EMOM:   ecc HF lower off table  Elevated KDL :30 each  Goblet squat 18 in box + 2 airex       x15       3#   Quadruped rocking moving into IR  1/2 kneel chop  x8 each  x10 R/L    Double GTB   Supine SKTC flexion      EMOM:  Hip thrust off SB  KDL to 12 in box 3D  1/2 kneel chest press to overhead TB     NV        Progress to rev lunge         gastroc KDL                Standing HF 20# inf glide HEP  Quad fatigue   Manual Intervention (16635)      hypervolt glut, TFL, HF  hypervolt lat hip  hypervolt TFL, lat hip 6 min  3x15 SL ABD 2#  2x15 IR 2#   For muscle fac lv 3  For muscle fac lv 3   Active release glut med with SL ABD     CFM HF/RF CFM     Hip flexion  With inf glide     hypervolt   TFL, VL, RF, incision   L SL ADD \"Stick\"  L Passive prone stretch IR stretch   L MRE prone IR activation               NMR re-education (09420)   CUES NEEDED   Prone ADD + hip IR iso pillow under hips  OVL   Prone SL HSC + YTB lat hip iso  Pillow under hips   Prone IR/ER MRE ecc  Post lateral HS STW   Bosu ATW yellow MB x5 R/L Heels at small [place]   Kneeling rev lean  Quad lengthening   Seated hip flexion self inf glide mob     Step up with rotation YTB for knee RNT YTB                   Ant lunge bosu ([place] side down) :5x8    TB ext with march 2# airex LLE 2x10 GTB   TB ext march YTB ABD, IR act   YTB- preventing IR of hip during march                               Therapeutic Exercise and NMR EXR  [] (57021) Provided verbal/tactile cueing for activities related to strengthening, flexibility, endurance, ROM for improvements in LE, proximal hip, and core control with self care, mobility, lifting, ambulation.  [] (85133) Provided verbal/tactile cueing for activities related to improving balance, coordination, kinesthetic sense, posture, motor skill, proprioception  to assist with LE, proximal hip, and core control in self care, mobility, lifting, ambulation and eccentric single leg control. NMR and Therapeutic Activities:    [] (17490 or 67542) Provided verbal/tactile cueing for activities related to improving balance, coordination, kinesthetic sense, posture, motor skill, proprioception and motor activation to allow for proper function of core, proximal hip and LE with self care and ADLs  [] (42019) Gait Re-education- Provided training and instruction to the patient for proper LE, core and proximal hip recruitment and positioning and eccentric body weight control with ambulation re-education including up and down stairs     Home Exercise Program:    [x] (33433) Reviewed/Progressed HEP activities related to strengthening, flexibility, endurance, ROM of core, proximal hip and LE for functional self-care, mobility, lifting and ambulation/stair navigation   [] (01021)Reviewed/Progressed HEP activities related to improving balance, coordination, kinesthetic sense, posture, motor skill, proprioception of core, proximal hip and LE for self care, mobility, lifting, and ambulation/stair navigation      Manual Treatments:  PROM / STM / Oscillations-Mobs:  G-I, II, III, IV (PA's, Inf., Post.)  [x] (33975) Provided manual therapy to mobilize LE, proximal hip and/or LS spine soft tissue/joints for the purpose of modulating pain, promoting relaxation,  increasing ROM, reducing/eliminating soft tissue swelling/inflammation/restriction, improving soft tissue extensibility and allowing for proper ROM for normal function with self care, mobility, lifting and ambulation. Modalities:     [] GAME READY (VASO)- for significant edema, swelling, pain control.      Charges:  Timed Code Treatment Minutes: 40   Total Treatment Minutes: 40     BWC time in/time out:   (and requires time in and out for each CPT code)    [] EVAL (LOW) 51961   [] EVAL (MOD) 10280  [] EVAL (HIGH) 49052   [] RE-EVAL     [x] BK(01607) x  1  [] IONTO  [x] NMR (54079) x  1 [] VASO  [x] Manual (66572) x 1    [] Other:  [] TA x      [] Mech Traction (15693)  [] ES(attended) (35115)      [] ES (un) (47454):       GOALS:     Long Term Goals: To be achieved in: 6 weeks  1. Disability index score of 30% or less for the LEFS to assist with reaching prior level of function. [x] Progressing: [] Met: [] Not Met: [] Adjusted  2. Patient will demonstrate an increase in Strength to good proximal hip and core activation to allow for proper functional mobility as indicated by patients Functional Deficits. [x] Progressing: [] Met: [] Not Met: [] Adjusted  3. Patient will return to stair functional activities without increased symptoms or restriction. [] Progressing:  [x] Met: [] Not Met: [] Adjusted  4. Pt will return to golf. [] Progressing: [x] Met: [] Not Met: [] Adjusted         Overall Progression Towards Functional goals/ Treatment Progress Update:  [x] Patient is progressing as expected towards functional goals listed. [] Progression is slowed due to complexities/Impairments listed. [] Progression has been slowed due to co-morbidities. [] Plan just implemented, too soon to assess goals progression <30days   [] Goals require adjustment due to lack of progress  [] Patient is not progressing as expected and requires additional follow up with physician  [] Other    Prognosis for POC: [x] Good [] Fair  [] Poor      Patient requires continued skilled intervention: [x] Yes  [] No    Treatment/Activity Tolerance:  [x] Patient able to complete treatment  [] Patient limited by fatigue  [] Patient limited by pain    [] Patient limited by other medical complications  [] Other:     ASSESSMENT: No reported pain and improved confidence in managing LBP and RLE symptoms. Tolerated lateral hip activation with hypervolt well.   Return to Play: (if applicable)   []  Stage 1: Intro to Strength   []  Stage 2: Return to Run and Strength   []  Stage 3: Return to Jump and Strength   []  Stage 4: Dynamic Strength and Agility   []  Stage 5: Sport Specific Training     []  Ready to Return to Play, Meets All Above Stages   []  Not Ready for Return to Sports   Comments:                               PLAN: See eval  [x] Continue per plan of care: 1x/ every other week 6 weeks [] Alter current plan (see comments above)  [] Plan of care initiated [] Hold pending MD visit [] Discharge      Electronically signed by:  Tor Barrett, PT , DPT, CDNT         Note: If patient does not return for scheduled/ recommended follow up visits, this note will serve as a discharge from care along with most recent update on progress.

## 2021-12-21 ENCOUNTER — HOSPITAL ENCOUNTER (OUTPATIENT)
Dept: PHYSICAL THERAPY | Age: 51
Setting detail: THERAPIES SERIES
Discharge: HOME | End: 2021-12-21
Payer: COMMERCIAL

## 2021-12-21 PROCEDURE — 97110 THERAPEUTIC EXERCISES: CPT | Performed by: PHYSICAL THERAPIST

## 2021-12-21 PROCEDURE — 97140 MANUAL THERAPY 1/> REGIONS: CPT | Performed by: PHYSICAL THERAPIST

## 2021-12-21 PROCEDURE — 97112 NEUROMUSCULAR REEDUCATION: CPT | Performed by: PHYSICAL THERAPIST

## 2021-12-21 NOTE — PLAN OF CARE
The [address], [address] [facility], [address] [place], [place]            Physical Therapy Treatment Note/ Progress Report:      To:       Dr Dueñas                           Patient: Deven Tristan                                   : 1970                      MRN:[id number]  Evaluation Date: 6/3/2021                                           Diagnosis Information:  ·             Diagnosis: M89.9, 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  ·  Treatment Diagnosis: R ankle pain M25.571     Physical Therapy Certification/Re-Certification Form  Dear Edmund Prince  The following patient has been evaluated for physical therapy services and for therapy to continue, Medicare requires monthly physician review of the treatment plan. Please review the attached evaluation and/or summary of the patient's plan of care, and verify that you agree therapy should continue by signing the attached document and sending it back to our office.  If you have any questions or concerns, please don't hesitate to call.     Thank you for your referral.     Physician Signature:________________________________Date:__________________  By signing above, therapists plan is approved by physician     Plan of Care/Treatment to date:  [x]??? Therapeutic Exercise                     []? ?? Modalities:  []??? Therapeutic Activity                                   []? ?? Ultrasound              []??? Electric Stimulation  [x]? ?? Gait Training                                              []? ?? Cervical Traction    []? ?? Lumbar Traction  [x]? ?? Neuromuscular Re-education                   []? ?? Cold/hotpack          []? ?? Iontophoresis           [x]? ?? Instruction in SRD                          VHOXQ:  [x]??? Manual Therapy                                         []? ??             []??? Aquatic Therapy                                        []? ??                                     Frequency/Duration:  # 35  WFL, R 55    Ankle Ev         Strength  LEFT RIGHT     Ankle DF   5  5    Ankle PF   SL HR 2 in from ground  x4    Ankle Inv   4+  4+    Ankle EV   4+ 4+   Prone IR/seated   L 4, R 4+/4+ B   Prone ER/seated   L 4+, R 4/ 4+ B   HF   4     See below for hip assessment    Number of Comorbidities:  []0     [x]1-2    []3+    Latex Allergy:  [x]NO      []YES  Preferred Language for Healthcare:   [x]English       []other:    SUBJECTIVE:  Foot acted up for no good reason. It lasted a few days and now it is fine.   OBJECTIVE:    Observation:    Test measurements:      RESTRICTIONS/PRECAUTIONS:  HTN, OA    Exercises/Interventions:     Therapeutic Ex (04092) Sets/sec Reps Notes/CUES   Pt ed: posture with new chair, hip motor control without lumbar compensation     Quad stretch on chair      90-90 hip ER/ABD at wall :5x5 R/L  Pillow at hips and wall   SL ABD  Prone hip ext with glut firing ed     EMOM:   ecc HF lower off table  Elevated KDL :30 each  Goblet squat 18 in box + 2 airex       x15       3#   Quadruped rocking moving into IR  1/2 kneel chop  x8 each  x10 R/L    Double GTB   Retro lunge glider with TB press  Rev lunge with TB press 3x8 R/L  2x8 R/L  GTB  GTB   EMOM:  Hip thrust off SB  KDL to 12 in box 3D  1/2 kneel chest press to overhead TB     NV        Progress to rev lunge         gastroc KDL                Standing HF 20# inf glide HEP  Quad fatigue   Manual Intervention (38490)      hypervolt glut, TFL, HF  hypervolt lat hip  hypervolt TFL, lat hip 6 min  3x15 SL ABD 2#  2x15 IR 2#   For muscle fac lv 3  For muscle fac lv 3   Active release glut med with SL ABD     CFM HF/RF CFM     Hip flexion  With inf glide     hypervolt   TFL, VL, RF, incision   L SL ADD \"Stick\"  L Passive prone stretch IR stretch   L MRE prone IR activation               NMR re-education (34341)   CUES NEEDED   Prone ADD + hip IR iso pillow under hips  OVL   Prone SL HSC + YTB lat hip iso  Pillow under hips   Prone IR/ER MRE ecc Post lateral HS STW   Bosu ATW yellow MB x5 R/L Heels at small [place]   Kneeling rev lean  Quad lengthening   Seated hip flexion self inf glide mob     Step up with rotation YTB for knee RNT YTB                   Ant lunge bosu ([place] side down) :5x8    TB ext with march 2# airex LLE 2x10 GTB   TB ext march YTB ABD, IR act   YTB- preventing IR of hip during march                               Therapeutic Exercise and NMR EXR  [] (02282) Provided verbal/tactile cueing for activities related to strengthening, flexibility, endurance, ROM for improvements in LE, proximal hip, and core control with self care, mobility, lifting, ambulation.  [] (20745) Provided verbal/tactile cueing for activities related to improving balance, coordination, kinesthetic sense, posture, motor skill, proprioception  to assist with LE, proximal hip, and core control in self care, mobility, lifting, ambulation and eccentric single leg control.      NMR and Therapeutic Activities:    [] (62996 or 79963) Provided verbal/tactile cueing for activities related to improving balance, coordination, kinesthetic sense, posture, motor skill, proprioception and motor activation to allow for proper function of core, proximal hip and LE with self care and ADLs  [] (73706) Gait Re-education- Provided training and instruction to the patient for proper LE, core and proximal hip recruitment and positioning and eccentric body weight control with ambulation re-education including up and down stairs     Home Exercise Program:    [x] (91290) Reviewed/Progressed HEP activities related to strengthening, flexibility, endurance, ROM of core, proximal hip and LE for functional self-care, mobility, lifting and ambulation/stair navigation   [] (00828)Reviewed/Progressed HEP activities related to improving balance, coordination, kinesthetic sense, posture, motor skill, proprioception of core, proximal hip and LE for self care, mobility, lifting, and ambulation/stair navigation      Manual Treatments:  PROM / STM / Oscillations-Mobs:  G-I, II, III, IV (PA's, Inf., Post.)  [x] (37300) Provided manual therapy to mobilize LE, proximal hip and/or LS spine soft tissue/joints for the purpose of modulating pain, promoting relaxation,  increasing ROM, reducing/eliminating soft tissue swelling/inflammation/restriction, improving soft tissue extensibility and allowing for proper ROM for normal function with self care, mobility, lifting and ambulation. Modalities:     [] GAME READY (VASO)- for significant edema, swelling, pain control. Charges:  Timed Code Treatment Minutes: 40   Total Treatment Minutes: 40     BWC time in/time out:   (and requires time in and out for each CPT code)    [] EVAL (LOW) 36330   [] EVAL (MOD) 34659  [] EVAL (HIGH) 58958   [] RE-EVAL     [x] LD(39245) x  1  [] IONTO  [x] NMR (46229) x  1 [] VASO  [x] Manual (84398) x 1    [] Other:  [] TA x      [] Mech Traction (30383)  [] ES(attended) (96804)      [] ES (un) (44579):       GOALS:     Long Term Goals: To be achieved in: 6 weeks  1. Disability index score of 30% or less for the LEFS to assist with reaching prior level of function. [x] Progressing: [] Met: [] Not Met: [] Adjusted  2. Patient will demonstrate an increase in Strength to good proximal hip and core activation to allow for proper functional mobility as indicated by patients Functional Deficits. [x] Progressing: [] Met: [] Not Met: [] Adjusted  3. Patient will return to stair functional activities without increased symptoms or restriction. [] Progressing:  [x] Met: [] Not Met: [] Adjusted  4. Pt will return to golf. [] Progressing: [x] Met: [] Not Met: [] Adjusted         Overall Progression Towards Functional goals/ Treatment Progress Update:  [x] Patient is progressing as expected towards functional goals listed. [] Progression is slowed due to complexities/Impairments listed.   [] Progression has been slowed due to co-morbidities. [] Plan just implemented, too soon to assess goals progression <30days   [] Goals require adjustment due to lack of progress  [] Patient is not progressing as expected and requires additional follow up with physician  [] Other    Prognosis for POC: [x] Good [] Fair  [] Poor      Patient requires continued skilled intervention: [x] Yes  [] No    Treatment/Activity Tolerance:  [x] Patient able to complete treatment  [] Patient limited by fatigue  [] Patient limited by pain    [] Patient limited by other medical complications  [] Other:     ASSESSMENT: Pt with improved gait and RLE control. Deficits in core and eccentric hip flexor control and SL lateral hip control. Will benefit from continued PT. Return to Play: (if applicable)   []  Stage 1: Intro to Strength   []  Stage 2: Return to Run and Strength   []  Stage 3: Return to Jump and Strength   []  Stage 4: Dynamic Strength and Agility   []  Stage 5: Sport Specific Training     []  Ready to Return to Play, Meets All Above Stages   []  Not Ready for Return to Sports   Comments:                               PLAN: See eval  [x] Continue per plan of care: 1x/ every other week 6 weeks [] Alter current plan (see comments above)  [] Plan of care initiated [] Hold pending MD visit [] Discharge      Electronically signed by:  Russell Edgar, PT , DPT, CDNT         Note: If patient does not return for scheduled/ recommended follow up visits, this note will serve as a discharge from care along with most recent update on progress.

## 2022-08-01 ENCOUNTER — HOSPITAL ENCOUNTER (OUTPATIENT)
Dept: PHYSICAL THERAPY | Age: 52
Setting detail: THERAPIES SERIES
Discharge: HOME | End: 2022-08-01
Payer: COMMERCIAL

## 2022-08-01 PROCEDURE — 97112 NEUROMUSCULAR REEDUCATION: CPT | Performed by: PHYSICAL THERAPIST

## 2022-08-01 PROCEDURE — 97161 PT EVAL LOW COMPLEX 20 MIN: CPT | Performed by: PHYSICAL THERAPIST

## 2022-08-01 PROCEDURE — 97140 MANUAL THERAPY 1/> REGIONS: CPT | Performed by: PHYSICAL THERAPIST

## 2022-08-01 NOTE — FLOWSHEET NOTE
ACLR  Functional Disability Index:  FOTO lumbar 63/100       Pain Scale: 1-7/10  Easing factors: supine lying with feet on SB, stretches   Provocative factors:   Height 69in  Weight 219 lb  Type: []Constant   [x]Intermittent  []Radiating []Localized []other:     Paresthesias: lateral thigh- constant    Functional Limitations/Impairments: [x]Sitting > 2hours when traveling [x]Standing >1 hour []Walking    [x]Squatting/bending  []Stairs           []ADL's  []Transfers []Sports/Recreation []Other:    Occupation/School: employed    Living Status/Prior Level of Function: Independent with ADLs and IADLs      OBJECTIVE:     Standing exam ROM/Normal Abnormal Comments   ROM         flexion  50 hamstring tightness    flat lower lumbar, rounded thoracic   extension  13        side bend  WNL       rotation Slight restriction L       Toe walk (S2)         Heel walk (L4)         Stork         SLS/SLS with rotation                   Standing flexion (PSIS)         Standing ext (sacral sulci)         Gillets test            Seated exam ROM/Normal Abnormal Comments   ROM         Trunk rotation  restricted to L       Pelvic height         Seated flexion         Bilat hip IR                   Dermatomes         Inguinal area (L1)         Anterior mid-thigh (L2)         Distal ant thigh/ med knee (L3)          Medial lower leg and foot (L5)         Lateral lower leg/foot (S1)         Posterior calf (S1)         Medial calcaneus (S2)                   Strength/Myotomes         Hip flexion (L1-2)   weakness R   Core weakness noted B   Knee ext (L2-4)         DF (L4-5)         Great toe ext (L5)         Ankle eversion (S1-2)         Ankle PF (S1-2)          TrA Unable to hold table top position with appropriate control       Reflexes         Biceps C5-6         Brachioradialis C6         Triceps C7-8         Patellar tendon (L3-4)         Achilles-seated (S1-2)            Clonus         Babinski         Grants                   Tests course of rehabilitation):   []None           Arthritic conditions   []Rheumatoid arthritis (M05.9)  [x]Osteoarthritis (M19.91)   Cardiovascular conditions   [x]Hypertension (I10)  []Hyperlipidemia (E78.5)  []Angina pectoris (I20)  []Atherosclerosis (I70)   Musculoskeletal conditions   []Disc pathology   []Congenital spine pathologies   []Prior surgical intervention  []Osteoporosis (M81.8)  []Osteopenia (M85.8)   Endocrine conditions   []Hypothyroid (E03.9)  []Hyperthyroid Gastrointestinal conditions   []Constipation (D03.29)   Metabolic conditions   []Morbid obesity (E66.01)  []Diabetes type 1(E10.65) or 2 (E11.65)   []Neuropathy (G60.9)     Pulmonary conditions   []Asthma (J45)  []Coughing   []COPD (J44.9)   Psychological Disorders  []Anxiety (F41.9)  []Depression (F32.9)   []Other:   []Other:          Barriers to/and or personal factors that will affect rehab potential:              []Age  []Sex              []Motivation/Lack of Motivation                        []Co-Morbidities              []Cognitive Function, education/learning barriers              []Environmental, home barriers              []profession/work barriers  [x]past PT/medical experience  []other:  Justification: pt will do well    Falls Risk Assessment (30 days):   [x] Falls Risk assessed and no intervention required. [] Falls Risk assessed and Patient requires intervention due to being higher risk   TUG score (>12s at risk):     [] Falls education provided, including       G-Codes:       ASSESSMENT: Pt is a 47year old male with a h/o L5-S1 fusion and R THR presenting with lumbar irritation and consistent R thigh numbness. He presents with the above deficits and will benefit from PT to address these issues and return to PLOF.   Functional Impairments:     [x]Noted lumbar/proximal hip hypomobility   []Noted lumbosacral and/or generalized hypermobility   []Decreased Lumbosacral/hip/LE functional ROM   [x]Decreased core/proximal hip strength and neuromuscular control    [x]Decreased LE functional strength    []Abnormal reflexes/sensation/myotomal/dermatomal deficits  [x]Reduced balance/proprioceptive control    []other:      Functional Activity Limitations (from functional questionnaire and intake)   [x]Reduced ability to tolerate prolonged functional positions   [x]Reduced ability or difficulty with changes of positions or transfers between positions   [x]Reduced ability to maintain good posture and demonstrate good body mechanics with sitting, bending, and lifting   []Reduced ability to sleep   [] Reduced ability or tolerance with driving and/or computer work   [x]Reduced ability to perform lifting, reaching, carrying tasks   []Reduced ability to squat   [x]Reduced ability to forward bend   []Reduced ability to ambulate prolonged functional periods/distances/surfaces   []Reduced ability to ascend/descend stairs   []other:       Participation Restrictions   [x]Reduced participation in self care activities   [x]Reduced participation in home management activities   []Reduced participation in work activities   []Reduced participation in social activities. []Reduced participation in sport/recreation activities. Classification:   []Signs/symptoms consistent with Lumbar instability/stabilization subgroup. [x]Signs/symptoms consistent with Lumbar mobilization/manipulation subgroup. []Signs/symptoms consistent with Lumbar direction specific/centralization subgroup   []Signs/symptoms consistent with Lumbar traction subgroup     []Signs/symptoms consistent with lumbar facet dysfunction   []Signs/symptoms consistent with lumbar stenosis type dysfunction   []Signs/symptoms consistent with nerve root involvement including myotome & dermatome dysfunction   []Signs/symptoms consistent with post-surgical status including: decreased ROM, strength and function.    [x]signs/symptoms consistent with pathology which may benefit from Dry needling     []other: Prognosis/Rehab Potential:      []Excellent   [x]Good    []Fair   []Poor    Tolerance of evaluation/treatment:    []Excellent   [x]Good    []Fair   []Poor    Physical Therapy Evaluation Complexity Justification  [x] A history of present problem with:  [] no personal factors and/or comorbidities that impact the plan of care;  [x]1-2 personal factors and/or comorbidities that impact the plan of care  []3 personal factors and/or comorbidities that impact the plan of care  [x] An examination of body systems using standardized tests and measures addressing any of the following: body structures and functions (impairments), activity limitations, and/or participation restrictions;:  [x] a total of 1-2 or more elements   [] a total of 3 or more elements   [] a total of 4 or more elements   [x] A clinical presentation with:  [x] stable and/or uncomplicated characteristics   [] evolving clinical presentation with changing characteristics  [] unstable and unpredictable characteristics;   [x] Clinical decision making of [x] low, [] moderate, [] high complexity using standardized patient assessment instrument and/or measurable assessment of functional outcome. [x] EVAL (LOW) 15162 (typically 20 minutes face-to-face)  [] EVAL (MOD) 56415 (typically 30 minutes face-to-face)  [] EVAL (HIGH) 08911 (typically 45 minutes face-to-face)  [] RE-EVAL     PLAN: Begin PT focusing on: proximal hip mobilizations, LB mobs, LB core activation, proximal hip activation, and HEP    Frequency/Duration:  1 days per week for 8 Weeks:  Interventions:  []  Therapeutic exercise including: strength training, ROM, for LE, Glutes and core   []  NMR activation and proprioception for glutes , LE and Core   []  Manual therapy as indicated for Hip complex, LE and spine to include: Dry Needling/IASTM, STM, PROM, Gr I-IV mobilizations, manipulation.    []  Modalities as needed that may include: thermal agents, E-stim, Biofeedback, US, iontophoresis as indicated  []  Patient education on joint protection, postural re-education, activity modification, progression of HEP. HEP instruction:     GOALS:  Patient stated goal: prevent flare ups  [] Progressing: [] Met: [x] Not Met: [] Adjusted    Therapist goals for Patient:   Short Term Goals: To be achieved in: 2 weeks  1. Independent in HEP and progression per patient tolerance, in order to prevent re-injury. [] Progressing: [] Met: [x] Not Met: [] Adjusted  2. Patient will have a decrease in pain to facilitate improvement in movement, function, and ADLs as indicated by Functional Deficits. [] Progressing: [] Met: [x] Not Met: [] Adjusted      Long Term Goals: To be achieved in: 8 weeks  1. Disability index score of 70/100 or more for the FOTO lumbar  to assist with reaching prior level of function. [] Progressing: [] Met: [x] Not Met: [] Adjusted  2. Patient will demonstrate increased AROM to WNL, good LS mobility, good hip ROM to allow for proper joint functioning as indicated by patients Functional Deficits. [] Progressing: [] Met: [x] Not Met: [] Adjusted  3. Patient will demonstrate an increase in Strength to good proximal hip and core activation to allow for proper functional mobility as indicated by patients Functional Deficits. [] Progressing: [] Met: [x] Not Met: [] Adjusted  4. Patient will return to standing and sitting endurance (>2 hours for travel) functional activities without increased symptoms or restriction. [] Progressing: [] Met: [x] Not Met: [] Adjusted  5. Pt will demonstrate appropriate lifting mechanics in order to lift >/=10lbs from floor to waist height.   [] Progressing: [] Met: [x] Not Met: [] Adjusted       Electronically signed by:  Waleska Leary, PT DPT

## 2022-08-01 NOTE — PLAN OF CARE
The [facility] and [facility], [address] [facility], [address]     Dear Dr Ariel Weiss  ,    We had the pleasure of evaluating the following patient for physical therapy services at [address] [place] [address]. A summary of our findings can be found in the initial assessment below. This includes our plan of care. If you have any questions or concerns regarding these findings, please do not hesitate to contact me at the office phone number checked above. Thank you for the referral.       Physician Signature:_______________________________Date:__________________  By signing above (or electronic signature), therapists plan is approved by physician      Patient: Eliseo Zheng   : 1970   MRN: [id number]  Referring Physician:        Evaluation Date: 2022      Medical Diagnosis Information:    Medical Diagnosis: spondylolisthesis M43.16, spondylosis M47.616, lumbar fusion Z98.1                                          Treatment Diagnosis:  Right hip pain M25.551 , Lumbar pain M54.50    Insurance information:      C-SSRS Triggered by Intake questionnaire (Past 2 wk assessment):   [x] No, Questionnaire did not trigger screening.   [] Yes, Patient intake triggered further evaluation      [] C-SSRS Screening completed  [] PCP notified via Plan of Care  [] Emergency services notified      Precautions/ Contra-indications: HTN, OA  Latex Allergy:  [x]NO      []YES  Preferred Language for Healthcare:   [x]English       []other:    SUBJECTIVE: Patient stated complaint: Pt reports he can have episodes of severe lumbar pain, right-sided in nature. Currently, he has not had a flare up for a few months. He performs lifting things from the floor with caution and shifts his weight to the L, some difficulty carrying >10 lbs, and is unable to sit for >2 hours.       Relevant Medical History:L5-S1 fusion, R anterior THR, L ACLR  Functional Disability Index:  FOTO lumbar 63/100       Pain Scale: 1-7/10  Easing factors: supine lying with feet on SB, stretches   Provocative factors:   Height 69in  Weight 219 lb  Type: []Constant   [x]Intermittent  []Radiating []Localized []other:     Paresthesias: lateral thigh- constant    Functional Limitations/Impairments: [x]Sitting > 2hours when traveling [x]Standing >1 hour []Walking    [x]Squatting/bending  []Stairs           []ADL's  []Transfers []Sports/Recreation []Other:    Occupation/School: employed    Living Status/Prior Level of Function: Independent with ADLs and IADLs      OBJECTIVE:     Standing exam ROM/Normal Abnormal Comments   ROM         flexion  50 hamstring tightness    flat lower lumbar, rounded thoracic   extension  13        side bend  WNL       rotation Slight restriction L       Toe walk (S2)         Heel walk (L4)         Stork         SLS/SLS with rotation                   Standing flexion (PSIS)         Standing ext (sacral sulci)         Gillets test            Seated exam ROM/Normal Abnormal Comments   ROM         Trunk rotation  restricted to L       Pelvic height         Seated flexion         Bilat hip IR                   Dermatomes         Inguinal area (L1)         Anterior mid-thigh (L2)         Distal ant thigh/ med knee (L3)          Medial lower leg and foot (L5)         Lateral lower leg/foot (S1)         Posterior calf (S1)         Medial calcaneus (S2)                   Strength/Myotomes         Hip flexion (L1-2)   weakness R   Core weakness noted B   Knee ext (L2-4)         DF (L4-5)         Great toe ext (L5)         Ankle eversion (S1-2)         Ankle PF (S1-2)          TrA Unable to hold table top position with appropriate control       Reflexes         Biceps C5-6         Brachioradialis C6         Triceps C7-8         Patellar tendon (L3-4)         Achilles-seated (S1-2)            Clonus         Babinski         Grants                   Tests Normal Abnormal Comments   Slump test-deg of knee flexion                      Supine exam L R Comments   ROM  100 100     Hip flexion         abduction         Hip IR  25 40     Hip ER  45 55     Knee ext - flexion         Supine hamstring flexibility  L 120, R 125 ASLR       Piriformis flexibility  restriction       MITCH/Clayton test                   Hip scour         SLR         Crossed SLR         Supine to sit         SI distraction/compression         Hip thrust            Prone exam ROM/Normal Abnormal Comments   Hip ext ROM         Hip ext strength         Hip IR ROM R restriction ~10                 PA/spring  restriction upper lumbar PAs       Sacral spring                   Prone knee flexion test (innominate)         Femoral nerve tension (L2-4)         Achilles reflex/Pheasant test (S1-2)              Joint mobility: restriction upper lumbar PAs   []Normal    []Hypo   []Hyper    Palpation: restriction B paraspinals along incision    Functional Mobility/Transfers: independent    Posture: dnd  Breathing Posture Normal Abnormal    Seated High- Low Test Move symmetrically Chest breather  Belly breather  Lumbar extension      Lateral Expansion Test Good lateral expansion at ribs and sp     Supine High-Low Test Move symmetrically Chest breather  Belly breather    Breath Holding (<15sec poor CO2 tolerance)                Gait: (include devices/WB status) WNL    Bandages/Dressings/Incisions: old incision healed- slight restriction                         [x] Patient history, allergies, meds reviewed. Medical chart reviewed. See intake form. Review Of Systems (ROS):  [x]Performed Review of systems (Integumentary, CardioPulmonary, Neurological) by intake and observation. Intake form has been scanned into medical record. Patient has been instructed to contact their primary care physician regarding ROS issues if not already being addressed at this time.       Co-morbidities/Complexities (which will affect course of rehabilitation):   []None           Arthritic conditions   []Rheumatoid arthritis (M05.9)  [x]Osteoarthritis (M19.91)   Cardiovascular conditions   [x]Hypertension (I10)  []Hyperlipidemia (E78.5)  []Angina pectoris (I20)  []Atherosclerosis (I70)   Musculoskeletal conditions   []Disc pathology   []Congenital spine pathologies   []Prior surgical intervention  []Osteoporosis (M81.8)  []Osteopenia (M85.8)   Endocrine conditions   []Hypothyroid (E03.9)  []Hyperthyroid Gastrointestinal conditions   []Constipation (E23.34)   Metabolic conditions   []Morbid obesity (E66.01)  []Diabetes type 1(E10.65) or 2 (E11.65)   []Neuropathy (G60.9)     Pulmonary conditions   []Asthma (J45)  []Coughing   []COPD (J44.9)   Psychological Disorders  []Anxiety (F41.9)  []Depression (F32.9)   []Other:   []Other:          Barriers to/and or personal factors that will affect rehab potential:              []Age  []Sex              []Motivation/Lack of Motivation                        []Co-Morbidities              []Cognitive Function, education/learning barriers              []Environmental, home barriers              []profession/work barriers  [x]past PT/medical experience  []other:  Justification: pt will do well    Falls Risk Assessment (30 days):   [x] Falls Risk assessed and no intervention required. [] Falls Risk assessed and Patient requires intervention due to being higher risk   TUG score (>12s at risk):     [] Falls education provided, including       G-Codes:       ASSESSMENT: Pt is a 47year old male with a h/o L5-S1 fusion and R THR presenting with lumbar irritation and consistent R thigh numbness. He presents with the above deficits and will benefit from PT to address these issues and return to PLOF.   Functional Impairments:     [x]Noted lumbar/proximal hip hypomobility   []Noted lumbosacral and/or generalized hypermobility   []Decreased Lumbosacral/hip/LE functional ROM   [x]Decreased core/proximal hip strength and neuromuscular control    [x]Decreased LE functional strength    []Abnormal reflexes/sensation/myotomal/dermatomal deficits  [x]Reduced balance/proprioceptive control    []other:      Functional Activity Limitations (from functional questionnaire and intake)   [x]Reduced ability to tolerate prolonged functional positions   [x]Reduced ability or difficulty with changes of positions or transfers between positions   [x]Reduced ability to maintain good posture and demonstrate good body mechanics with sitting, bending, and lifting   []Reduced ability to sleep   [] Reduced ability or tolerance with driving and/or computer work   [x]Reduced ability to perform lifting, reaching, carrying tasks   []Reduced ability to squat   [x]Reduced ability to forward bend   []Reduced ability to ambulate prolonged functional periods/distances/surfaces   []Reduced ability to ascend/descend stairs   []other:       Participation Restrictions   [x]Reduced participation in self care activities   [x]Reduced participation in home management activities   []Reduced participation in work activities   []Reduced participation in social activities. []Reduced participation in sport/recreation activities. Classification:   []Signs/symptoms consistent with Lumbar instability/stabilization subgroup. [x]Signs/symptoms consistent with Lumbar mobilization/manipulation subgroup. []Signs/symptoms consistent with Lumbar direction specific/centralization subgroup   []Signs/symptoms consistent with Lumbar traction subgroup     []Signs/symptoms consistent with lumbar facet dysfunction   []Signs/symptoms consistent with lumbar stenosis type dysfunction   []Signs/symptoms consistent with nerve root involvement including myotome & dermatome dysfunction   []Signs/symptoms consistent with post-surgical status including: decreased ROM, strength and function.    [x]signs/symptoms consistent with pathology which may benefit from Dry needling     []other: Prognosis/Rehab Potential:      []Excellent   [x]Good    []Fair   []Poor    Tolerance of evaluation/treatment:    []Excellent   [x]Good    []Fair   []Poor    Physical Therapy Evaluation Complexity Justification  [x] A history of present problem with:  [] no personal factors and/or comorbidities that impact the plan of care;  [x]1-2 personal factors and/or comorbidities that impact the plan of care  []3 personal factors and/or comorbidities that impact the plan of care  [x] An examination of body systems using standardized tests and measures addressing any of the following: body structures and functions (impairments), activity limitations, and/or participation restrictions;:  [x] a total of 1-2 or more elements   [] a total of 3 or more elements   [] a total of 4 or more elements   [x] A clinical presentation with:  [x] stable and/or uncomplicated characteristics   [] evolving clinical presentation with changing characteristics  [] unstable and unpredictable characteristics;   [x] Clinical decision making of [x] low, [] moderate, [] high complexity using standardized patient assessment instrument and/or measurable assessment of functional outcome. [x] EVAL (LOW) 85904 (typically 20 minutes face-to-face)  [] EVAL (MOD) 95667 (typically 30 minutes face-to-face)  [] EVAL (HIGH) 71523 (typically 45 minutes face-to-face)  [] RE-EVAL     PLAN: Begin PT focusing on: proximal hip mobilizations, LB mobs, LB core activation, proximal hip activation, and HEP    Frequency/Duration:  1 days per week for 8 Weeks:  Interventions:  []  Therapeutic exercise including: strength training, ROM, for LE, Glutes and core   []  NMR activation and proprioception for glutes , LE and Core   []  Manual therapy as indicated for Hip complex, LE and spine to include: Dry Needling/IASTM, STM, PROM, Gr I-IV mobilizations, manipulation.    []  Modalities as needed that may include: thermal agents, E-stim, Biofeedback, US, iontophoresis as indicated  []  Patient education on joint protection, postural re-education, activity modification, progression of HEP. HEP instruction:     GOALS:  Patient stated goal: prevent flare ups  [] Progressing: [] Met: [x] Not Met: [] Adjusted    Therapist goals for Patient:   Short Term Goals: To be achieved in: 2 weeks  1. Independent in HEP and progression per patient tolerance, in order to prevent re-injury. [] Progressing: [] Met: [x] Not Met: [] Adjusted  2. Patient will have a decrease in pain to facilitate improvement in movement, function, and ADLs as indicated by Functional Deficits. [] Progressing: [] Met: [x] Not Met: [] Adjusted      Long Term Goals: To be achieved in: 8 weeks  1. Disability index score of 70/100 or more for the FOTO lumbar  to assist with reaching prior level of function. [] Progressing: [] Met: [x] Not Met: [] Adjusted  2. Patient will demonstrate increased AROM to WNL, good LS mobility, good hip ROM to allow for proper joint functioning as indicated by patients Functional Deficits. [] Progressing: [] Met: [x] Not Met: [] Adjusted  3. Patient will demonstrate an increase in Strength to good proximal hip and core activation to allow for proper functional mobility as indicated by patients Functional Deficits. [] Progressing: [] Met: [x] Not Met: [] Adjusted  4. Patient will return to standing and sitting endurance (>2 hours for travel) functional activities without increased symptoms or restriction. [] Progressing: [] Met: [x] Not Met: [] Adjusted  5. Pt will demonstrate appropriate lifting mechanics in order to lift >/=10lbs from floor to waist height.   [] Progressing: [] Met: [x] Not Met: [] Adjusted       Electronically signed by:  Timbo Bruce, PT DPT

## 2022-08-01 NOTE — FLOWSHEET NOTE
[facility], [address], [address]         Physical Therapy Treatment Note/ Progress Report:   Date:  2022    Patient Name:  Megha Farrell    :  1970  MRN: [id number]  Physician Information:     Medical/Treatment Diagnosis Information:    Medical Diagnosis: spondylolisthesis M43.16, spondylosis M47.616, lumbar fusion Z98.1                                          Treatment Diagnosis:  Right hip pain M25.551 , Lumbar pain M54.50  [] Conservative / [] Surgical - DOS:    Insurance/Certification information:     Number of Comorbidities:  []0     [x]1-2    []3+    Has the plan of care been signed (Y/N):        []  Yes                    [x]  No          Is this a Progress Report:        []  Yes                    [x]  No       If Yes:  Date Range for reporting period:  Beginnin2022  Ending:      Progress report will be due (10 Rx or 30 days ):         Recertification will be due (POC Duration  / 90 days ): 10/1/2022    Latex Allergy:  [x]NO      []YES  Preferred Language for Healthcare:   [x]English       []Other:      Visit # Insurance Allowable   1 8       SUBJECTIVE:  See eval    OBJECTIVE:    Test used Initial score Current Score   Pain Summary VAS 1-7/10      Functional questionnaire FOTO lumbar 63/100        Observation:  Palpation:      RESTRICTIONS/PRECAUTIONS: L5-S1 fusion, L ACLR, R THR    Exercises/Interventions:     Therapeutic Ex sets/reps comments   Supine Hamstring (S)     Supine Lat Ham (S)     Q/L (S)     LTR     Supine Piriformis (S)     Supine Hip flexor (S)          prone          R SL rotation stretch with breath x5                        Manual Intervention      IASTM paraspinals 8 min                                       NMR re-education      TA activation, TT march 5 min                          Therapeutic Exercise and NMR EXR  [] (07053) Provided verbal/tactile cueing for activities face-to-face)  [] EVAL (MOD) 86923 (typically 30 minutes face-to-face)  [] EVAL (HIGH) 44246 (typically 45 minutes face-to-face)  [] RE-EVAL     [] MH(52672) x     [] IONTO  [x] NMR (47320) x   1  [] VASO  [x] Manual (24575) x   1   [] Other:  [] TA x      [] Mech Traction (82394)  [] ES(attended) (45519)      [] ES (un) (20865):     Goals:   Long Term Goals: To be achieved in: 8 weeks  1. Disability index score of 70/100 or more for the FOTO lumbar  to assist with reaching prior level of function. [] Progressing: [] Met: [x] Not Met: [] Adjusted  2. Patient will demonstrate increased AROM to WNL, good LS mobility, good hip ROM to allow for proper joint functioning as indicated by patients Functional Deficits. [] Progressing: [] Met: [x] Not Met: [] Adjusted  3. Patient will demonstrate an increase in Strength to good proximal hip and core activation to allow for proper functional mobility as indicated by patients Functional Deficits. [] Progressing: [] Met: [x] Not Met: [] Adjusted  4. Patient will return to standing and sitting endurance (>2 hours for travel) functional activities without increased symptoms or restriction. [] Progressing: [] Met: [x] Not Met: [] Adjusted  5. Pt will demonstrate appropriate lifting mechanics in order to lift >/=10lbs from floor to waist height. [] Progressing: [] Met: [x] Not Met: [] Adjusted        Overall Progression Towards Functional goals/ Treatment Progress Update:  [] Patient is progressing as expected towards functional goals listed. [] Progression is slowed due to complexities/Impairments listed. [] Progression has been slowed due to co-morbidities.   [x] Plan just implemented, too soon to assess goals progression <30days   [] Goals require adjustment due to lack of progress  [] Patient is not progressing as expected and requires additional follow up with physician  [] Other     Prognosis for POC:     [x] Good          [] Fair             [] Poor        Patient requires continued skilled intervention:         [x] Yes             [] No     ASSESSMENT:      Treatment/Activity Tolerance:  [x] Patient able to complete treatment  [] Patient limited by fatigue  [] Patient limited by pain                     [] Patient limited by other medical complications  [] Other:          PLAN: See eval  [] Continue per plan of care [] Alter current plan (see comments)  [x] Plan of care initiated [] Hold pending MD visit [] Discharge    Electronically signed by: Tor Barrett PT DPT       Note: If patient does not return for scheduled/ recommended follow up visits, this note will serve as a discharge from care along with most recent update on progress.

## 2022-08-08 ENCOUNTER — HOSPITAL ENCOUNTER (OUTPATIENT)
Dept: PHYSICAL THERAPY | Age: 52
Setting detail: THERAPIES SERIES
Discharge: HOME | End: 2022-08-08
Payer: COMMERCIAL

## 2022-08-08 PROCEDURE — 97140 MANUAL THERAPY 1/> REGIONS: CPT | Performed by: PHYSICAL THERAPIST

## 2022-08-08 PROCEDURE — 97110 THERAPEUTIC EXERCISES: CPT | Performed by: PHYSICAL THERAPIST

## 2022-08-08 PROCEDURE — 97112 NEUROMUSCULAR REEDUCATION: CPT | Performed by: PHYSICAL THERAPIST

## 2022-08-08 NOTE — FLOWSHEET NOTE
[facility], [address] [facility], [address] [place], [place]         Physical Therapy Treatment Note/ Progress Report:   Date:  2022    Patient Name:  Gomez Lobato    :  1970  MRN: [id number]  Physician Information:     Medical/Treatment Diagnosis Information:    Medical Diagnosis: spondylolisthesis M43.16, spondylosis M47.616, lumbar fusion Z98.1                                          Treatment Diagnosis:  Right hip pain M25.551 , Lumbar pain M54.50  [] Conservative / [] Surgical - DOS:    Insurance/Certification information:     Number of Comorbidities:  []0     [x]1-2    []3+    Has the plan of care been signed (Y/N):        []  Yes                    [x]  No          Is this a Progress Report:        []  Yes                    [x]  No       If Yes:  Date Range for reporting period:  Beginnin2022  Ending:      Progress report will be due (10 Rx or 30 days ): 3/0/9172        Recertification will be due (POC Duration  / 90 days ): 10/1/2022    Latex Allergy:  [x]NO      []YES  Preferred Language for Healthcare:   [x]English       []Other:      Visit # Insurance Allowable   2 8       SUBJECTIVE:  No issues with plan.     OBJECTIVE:    Test used Initial score Current Score   Pain Summary VAS 1-7/10      Functional questionnaire FOTO lumbar 63/100        Observation:  Palpation:      RESTRICTIONS/PRECAUTIONS: L5-S1 fusion, L ACLR, R THR    Exercises/Interventions:     Therapeutic Ex sets/reps comments   Supine Hamstring (S)     Supine Lat Ham (S)     Q/L (S)     LTR     Supine Piriformis (S)     Supine Hip flexor (S)          prone          R SL rotation stretch with breath x5    Cat cow X5     Seated lower thoracic ext at wall with foam roll x5              Manual Intervention      IASTM paraspinals 8 min    MFD lower and mid thoracic X3 R/L  6 min    Thoracic PAs 5 minT8-T10 Gr 2                            NMR re-education      TA activation, TT march 5 min    Lifting mechanics  Deadlift mechanics X  5  min                     Therapeutic Exercise and NMR EXR  [] (43981) Provided verbal/tactile cueing for activities related to strengthening, flexibility, endurance, ROM  for improvements in proximal hip and core control with self care, mobility, lifting and ambulation.  [] (85484) Provided verbal/tactile cueing for activities related to improving balance, coordination, kinesthetic sense, posture, motor skill, proprioception  to assist with core control in self care, mobility, lifting, and ambulation. Therapeutic Activities:    [] (78701 or 90249) Provided verbal/tactile cueing for activities related to improving balance, coordination, kinesthetic sense, posture, motor skill, proprioception and motor activation to allow for proper function  with self care and ADLs  [] (71674) Provided training and instruction to the patient for proper core and proximal hip recruitment and positioning with ambulation re-education     Home Exercise Program:    [x] (01243) Reviewed/Progressed HEP activities related to strengthening, flexibility, endurance, ROM of core, proximal hip and LE for functional self-care, mobility, lifting and ambulation   [] (41902) Reviewed/Progressed HEP activities related to improving balance, coordination, kinesthetic sense, posture, motor skill, proprioception of core, proximal hip and LE for self care, mobility, lifting, and ambulation      Manual Treatments:  PROM / STM / Oscillations-Mobs:  G-I, II, III, IV (PA's, Inf., Post.)  [x] (23831) Provided manual therapy to mobilize proximal hip and LS spine soft tissue/joints for the purpose of modulating pain, promoting relaxation,  increasing ROM, reducing/eliminating soft tissue swelling/inflammation/restriction, improving soft tissue extensibility and allowing for proper ROM for normal function with self care, mobility, lifting and ambulation.      Modalities:      [] GR/ESU 15 min    [] GR 15 min  [] ESU     [] CP    [] MHP    [] declined  Charges:  Timed Code Treatment Minutes: 40   Total Treatment Minutes: 40     [x] EVAL (LOW) 23093 (typically 20 minutes face-to-face)  [] EVAL (MOD) 08522 (typically 30 minutes face-to-face)  [] EVAL (HIGH) 97080 (typically 45 minutes face-to-face)  [] RE-EVAL     [x] QI(62724) x   1  [] IONTO  [x] NMR (55834) x   1  [] VASO  [x] Manual (66601) x   1   [] Other:  [] TA x      [] Mech Traction (23465)  [] ES(attended) (85089)      [] ES (un) (81522):     Goals:   Long Term Goals: To be achieved in: 8 weeks  1. Disability index score of 70/100 or more for the FOTO lumbar  to assist with reaching prior level of function. [] Progressing: [] Met: [x] Not Met: [] Adjusted  2. Patient will demonstrate increased AROM to WNL, good LS mobility, good hip ROM to allow for proper joint functioning as indicated by patients Functional Deficits. [] Progressing: [] Met: [x] Not Met: [] Adjusted  3. Patient will demonstrate an increase in Strength to good proximal hip and core activation to allow for proper functional mobility as indicated by patients Functional Deficits. [] Progressing: [] Met: [x] Not Met: [] Adjusted  4. Patient will return to standing and sitting endurance (>2 hours for travel) functional activities without increased symptoms or restriction. [] Progressing: [] Met: [x] Not Met: [] Adjusted  5. Pt will demonstrate appropriate lifting mechanics in order to lift >/=10lbs from floor to waist height. [] Progressing: [] Met: [x] Not Met: [] Adjusted        Overall Progression Towards Functional goals/ Treatment Progress Update:  [] Patient is progressing as expected towards functional goals listed. [] Progression is slowed due to complexities/Impairments listed. [] Progression has been slowed due to co-morbidities.   [x] Plan just implemented, too soon to assess goals progression <30days   [] Goals require adjustment due to lack of progress  [] Patient is not progressing as expected and requires additional follow up with physician  [] Other     Prognosis for POC:     [x] Good          [] Fair             [] Poor        Patient requires continued skilled intervention:         [x] Yes             [] No     ASSESSMENT:  Pt with hypertrophy and thoracic stiffness at T8-T11. Tolerating IASTM to lumbar myofascia well. Cues for lifting mechanics for posterior hip shift and equal balance between BLE. Treatment/Activity Tolerance:  [x] Patient able to complete treatment  [] Patient limited by fatigue  [] Patient limited by pain                     [] Patient limited by other medical complications  [] Other:          PLAN: See eval  [x] Continue per plan of care [] Alter current plan (see comments)  [] Plan of care initiated [] Hold pending MD visit [] Discharge    Electronically signed by: Aurelia Pruitt PT DPT       Note: If patient does not return for scheduled/ recommended follow up visits, this note will serve as a discharge from care along with most recent update on progress.

## 2022-08-22 ENCOUNTER — APPOINTMENT (OUTPATIENT)
Dept: PHYSICAL THERAPY | Age: 52
End: 2022-08-22
Payer: COMMERCIAL

## 2022-08-29 ENCOUNTER — APPOINTMENT (OUTPATIENT)
Dept: PHYSICAL THERAPY | Age: 52
End: 2022-08-29
Payer: COMMERCIAL

## 2022-09-02 ENCOUNTER — HOSPITAL ENCOUNTER (OUTPATIENT)
Dept: PHYSICAL THERAPY | Age: 52
Setting detail: THERAPIES SERIES
Discharge: HOME | End: 2022-09-02
Payer: COMMERCIAL

## 2022-09-02 PROCEDURE — 97110 THERAPEUTIC EXERCISES: CPT | Performed by: PHYSICAL THERAPIST

## 2022-09-02 PROCEDURE — 97112 NEUROMUSCULAR REEDUCATION: CPT | Performed by: PHYSICAL THERAPIST

## 2022-09-02 PROCEDURE — 97140 MANUAL THERAPY 1/> REGIONS: CPT | Performed by: PHYSICAL THERAPIST

## 2022-09-02 NOTE — FLOWSHEET NOTE
[facility], [address] [facility], [address] [place] [address]         Physical Therapy Treatment Note/ Progress Report:   Date:  2022    Patient Name:  Janine Louie    :  1970  MRN: [id number]  Physician Information:     Medical/Treatment Diagnosis Information:    Medical Diagnosis: spondylolisthesis M43.16, spondylosis M47.616, lumbar fusion Z98.1                                          Treatment Diagnosis:  Right hip pain M25.551 , Lumbar pain M54.50  [] Conservative / [] Surgical - DOS:    Insurance/Certification information:     Number of Comorbidities:  []0     [x]1-2    []3+    Has the plan of care been signed (Y/N):        []  Yes                    [x]  No          Is this a Progress Report:        []  Yes                    [x]  No       If Yes:  Date Range for reporting period:  Beginnin2022  Ending:      Progress report will be due (10 Rx or 30 days ):         Recertification will be due (POC Duration  / 90 days ): 10/1/2022    Latex Allergy:  [x]NO      []YES  Preferred Language for Healthcare:   [x]English       []Other:      Visit # Insurance Allowable   3  PN NV 8       SUBJECTIVE:  No pain.     OBJECTIVE:    Test used Initial score Current Score   Pain Summary VAS 1-7/10      Functional questionnaire FOTO lumbar 63/100        Observation:  Palpation:      RESTRICTIONS/PRECAUTIONS: L5-S1 fusion, L ACLR, R THR    Exercises/Interventions:     Therapeutic Ex sets/reps comments   Supine Hamstring (S)     Supine Lat Ham (S)     Q/L (S)     LTR     Supine Piriformis (S)     Supine Hip flexor (S)          prone          R SL rotation stretch with breath x5    Cat cow X5     Seated lower thoracic ext at wall with foam roll x5    1/2 kneel thoracic rot at wall X8 R/L         Manual Intervention      IASTM paraspinals 8 min    MFD lower and mid thoracic    Thoracic PAs 7 minT8-T10 Gr 2                            NMR re-education      TA activation, TT march 5 min    Lifting mechanics  Deadlift mechanics X  5  min                     Therapeutic Exercise and NMR EXR  [] (99372) Provided verbal/tactile cueing for activities related to strengthening, flexibility, endurance, ROM  for improvements in proximal hip and core control with self care, mobility, lifting and ambulation.  [] (13685) Provided verbal/tactile cueing for activities related to improving balance, coordination, kinesthetic sense, posture, motor skill, proprioception  to assist with core control in self care, mobility, lifting, and ambulation. Therapeutic Activities:    [] (91572 or 14517) Provided verbal/tactile cueing for activities related to improving balance, coordination, kinesthetic sense, posture, motor skill, proprioception and motor activation to allow for proper function  with self care and ADLs  [] (60011) Provided training and instruction to the patient for proper core and proximal hip recruitment and positioning with ambulation re-education     Home Exercise Program:    [x] (44780) Reviewed/Progressed HEP activities related to strengthening, flexibility, endurance, ROM of core, proximal hip and LE for functional self-care, mobility, lifting and ambulation   [] (73080) Reviewed/Progressed HEP activities related to improving balance, coordination, kinesthetic sense, posture, motor skill, proprioception of core, proximal hip and LE for self care, mobility, lifting, and ambulation      Manual Treatments:  PROM / STM / Oscillations-Mobs:  G-I, II, III, IV (PA's, Inf., Post.)  [x] (41060) Provided manual therapy to mobilize proximal hip and LS spine soft tissue/joints for the purpose of modulating pain, promoting relaxation,  increasing ROM, reducing/eliminating soft tissue swelling/inflammation/restriction, improving soft tissue extensibility and allowing for proper ROM for normal function with self care, mobility, lifting and ambulation. Modalities:      [] GR/ESU 15 min    [] GR 15 min  [] ESU     [] CP    [] MHP    [] declined  Charges:  Timed Code Treatment Minutes: 40   Total Treatment Minutes: 40     [x] EVAL (LOW) 70415 (typically 20 minutes face-to-face)  [] EVAL (MOD) 52782 (typically 30 minutes face-to-face)  [] EVAL (HIGH) 39018 (typically 45 minutes face-to-face)  [] RE-EVAL     [x] GJ(01287) x   1  [] IONTO  [x] NMR (30433) x   1  [] VASO  [x] Manual (17067) x   1   [] Other:  [] TA x      [] Mech Traction (90046)  [] ES(attended) (83575)      [] ES (un) (37326):     Goals:   Long Term Goals: To be achieved in: 8 weeks  1. Disability index score of 70/100 or more for the FOTO lumbar  to assist with reaching prior level of function. [] Progressing: [] Met: [x] Not Met: [] Adjusted  2. Patient will demonstrate increased AROM to WNL, good LS mobility, good hip ROM to allow for proper joint functioning as indicated by patients Functional Deficits. [] Progressing: [] Met: [x] Not Met: [] Adjusted  3. Patient will demonstrate an increase in Strength to good proximal hip and core activation to allow for proper functional mobility as indicated by patients Functional Deficits. [] Progressing: [] Met: [x] Not Met: [] Adjusted  4. Patient will return to standing and sitting endurance (>2 hours for travel) functional activities without increased symptoms or restriction. [] Progressing: [] Met: [x] Not Met: [] Adjusted  5. Pt will demonstrate appropriate lifting mechanics in order to lift >/=10lbs from floor to waist height. [] Progressing: [] Met: [x] Not Met: [] Adjusted        Overall Progression Towards Functional goals/ Treatment Progress Update:  [] Patient is progressing as expected towards functional goals listed. [] Progression is slowed due to complexities/Impairments listed. [] Progression has been slowed due to co-morbidities.   [x] Plan just implemented, too soon to assess goals progression <30days   [] Goals require

## 2022-09-06 ENCOUNTER — HOSPITAL ENCOUNTER (OUTPATIENT)
Dept: PHYSICAL THERAPY | Age: 52
Setting detail: THERAPIES SERIES
Discharge: HOME | End: 2022-09-06
Payer: COMMERCIAL

## 2022-09-06 PROCEDURE — 97140 MANUAL THERAPY 1/> REGIONS: CPT | Performed by: PHYSICAL THERAPIST

## 2022-09-06 PROCEDURE — 97110 THERAPEUTIC EXERCISES: CPT | Performed by: PHYSICAL THERAPIST

## 2022-09-06 PROCEDURE — 97112 NEUROMUSCULAR REEDUCATION: CPT | Performed by: PHYSICAL THERAPIST

## 2022-09-06 NOTE — FLOWSHEET NOTE
[facility], [address] [facility], [address] [place], [place]         Physical Therapy Treatment Note/ Progress Report:   Date:  2022    Patient Name:  Samy Salomon    :  1970  MRN: [id number]  Physician Information:     Medical/Treatment Diagnosis Information:    Medical Diagnosis: spondylolisthesis M43.16, spondylosis M47.616, lumbar fusion Z98.1                                          Treatment Diagnosis:  Right hip pain M25.551 , Lumbar pain M54.50  [] Conservative / [] Surgical - DOS:    Insurance/Certification information:     Number of Comorbidities:  []0     [x]1-2    []3+    Has the plan of care been signed (Y/N):        []  Yes                    [x]  No          Is this a Progress Report:        [x]  Yes                    []  No       If Yes:  Date Range for reporting period:  Beginnin2022  Endin2022     Progress report will be due (10 Rx or 30 days ):         Recertification will be due (POC Duration  / 90 days ): 10/1/2022    Latex Allergy:  [x]NO      []YES  Preferred Language for Healthcare:   [x]English       []Other:      Visit # Insurance Allowable   4 8       SUBJECTIVE:  Continues to have no pain. Working on posture.     OBJECTIVE:    Test used Initial score Current Score   Pain Summary VAS 1-7/10      Functional questionnaire FOTO lumbar 63/100        ROM      Current   flexion  50 hamstring tightness  flat lower lumbar, rounded thoracic  continued rounded thoracic posture   extension  13     15   side bend  WNL       Seated rotation   Restriction R compared to L   rotation Slight restriction L    restriction to L     Observation:  Palpation:      RESTRICTIONS/PRECAUTIONS: L5-S1 fusion, L ACLR, R THR    Exercises/Interventions:     Therapeutic Ex sets/reps comments   Supine Hamstring (S)     Supine Lat Ham (S)     Q/L (S)     LTR     Supine Piriformis (S)     Supine Hip flexor (S)          prone R SL rotation stretch with breath x5    Cat cow X5     Seated lower thoracic ext at wall with foam roll x5    1/2 kneel thoracic rot at wall X8 R/L         Manual Intervention      IASTM paraspinals 8 min    MFD lower and mid thoracic    Thoracic PAs 7 minT8-T12 Gr 3                            NMR re-education      TA activation, TT march    Lifting mechanics  Deadlift mechanics 3 min  Mirror cues/dowel   Deadlift to box at knee height 3x8 20lb kb               Therapeutic Exercise and NMR EXR  [] (30380) Provided verbal/tactile cueing for activities related to strengthening, flexibility, endurance, ROM  for improvements in proximal hip and core control with self care, mobility, lifting and ambulation.  [] (85301) Provided verbal/tactile cueing for activities related to improving balance, coordination, kinesthetic sense, posture, motor skill, proprioception  to assist with core control in self care, mobility, lifting, and ambulation.      Therapeutic Activities:    [] (65649 or 78782) Provided verbal/tactile cueing for activities related to improving balance, coordination, kinesthetic sense, posture, motor skill, proprioception and motor activation to allow for proper function  with self care and ADLs  [] (20796) Provided training and instruction to the patient for proper core and proximal hip recruitment and positioning with ambulation re-education     Home Exercise Program:    [x] (09817) Reviewed/Progressed HEP activities related to strengthening, flexibility, endurance, ROM of core, proximal hip and LE for functional self-care, mobility, lifting and ambulation   [] (71862) Reviewed/Progressed HEP activities related to improving balance, coordination, kinesthetic sense, posture, motor skill, proprioception of core, proximal hip and LE for self care, mobility, lifting, and ambulation      Manual Treatments:  PROM / STM / Oscillations-Mobs:  G-I, II, III, IV (PA's, Inf., Post.)  [x] (16659) Provided manual therapy to mobilize proximal hip and LS spine soft tissue/joints for the purpose of modulating pain, promoting relaxation,  increasing ROM, reducing/eliminating soft tissue swelling/inflammation/restriction, improving soft tissue extensibility and allowing for proper ROM for normal function with self care, mobility, lifting and ambulation. Modalities:      [] GR/ESU 15 min    [] GR 15 min  [] ESU     [] CP    [] MHP    [] declined  Charges:  Timed Code Treatment Minutes: 40   Total Treatment Minutes: 40     [x] EVAL (LOW) 97164 (typically 20 minutes face-to-face)  [] EVAL (MOD) 98529 (typically 30 minutes face-to-face)  [] EVAL (HIGH) 96444 (typically 45 minutes face-to-face)  [] RE-EVAL     [x] PI(54887) x   1  [] IONTO  [x] NMR (72207) x   1  [] VASO  [x] Manual (48073) x   1   [] Other:  [] TA x      [] Mech Traction (04199)  [] ES(attended) (60823)      [] ES (un) (81221):     Goals:   Long Term Goals: To be achieved in: 8 weeks  1. Disability index score of 70/100 or more for the FOTO lumbar  to assist with reaching prior level of function. [] Progressing: [] Met: [x] Not Met: [] Adjusted  2. Patient will demonstrate increased AROM to WNL, good LS mobility, good hip ROM to allow for proper joint functioning as indicated by patients Functional Deficits. [x] Progressing: continues[] Met: [] Not Met: [] Adjusted  3. Patient will demonstrate an increase in Strength to good proximal hip and core activation to allow for proper functional mobility as indicated by patients Functional Deficits. [x] Progressing: [] Met: [] Not Met: [] Adjusted  4. Patient will return to standing and sitting endurance (>2 hours for travel) functional activities without increased symptoms or restriction. [x] Progressing: [] Met: [] Not Met: [] Adjusted  5. Pt will demonstrate appropriate lifting mechanics in order to lift >/=10lbs from floor to waist height.   [x] Progressing: [] Met: [] Not Met: [] Adjusted        Overall Progression Towards Functional goals/ Treatment Progress Update:  [x] Patient is progressing as expected towards functional goals listed. [] Progression is slowed due to complexities/Impairments listed. [] Progression has been slowed due to co-morbidities. [] Plan just implemented, too soon to assess goals progression <30days   [] Goals require adjustment due to lack of progress  [] Patient is not progressing as expected and requires additional follow up with physician  [] Other     Prognosis for POC:     [x] Good          [] Fair             [] Poor        Patient requires continued skilled intervention:         [x] Yes             [] No     ASSESSMENT:  Pt with controlled pain. Tolerating initiation of lumbar strengthening well. Requires consistent verbal and visual cues for weighted deadlifting to knee height. Overuse of lower thoracic musculature d/t weakness in lumbar stabilizers and restriction in mid thoracic spine. Pt will benefit from continued PT to address mid and lower thoracic mobility, core strength, and lifting mechanics in order to safely lift home objects from floor to waist height. Treatment/Activity Tolerance:  [x] Patient able to complete treatment  [] Patient limited by fatigue  [] Patient limited by pain                     [] Patient limited by other medical complications  [] Other:          PLAN: See eval  [x] Continue per plan of care [] Alter current plan (see comments)  [] Plan of care initiated [] Hold pending MD visit [] Discharge    Electronically signed by: Aileen Aragon PT DPT       Note: If patient does not return for scheduled/ recommended follow up visits, this note will serve as a discharge from care along with most recent update on progress.

## 2022-09-12 ENCOUNTER — HOSPITAL ENCOUNTER (OUTPATIENT)
Dept: PHYSICAL THERAPY | Age: 52
Setting detail: THERAPIES SERIES
Discharge: HOME | End: 2022-09-12
Payer: COMMERCIAL

## 2022-09-12 PROCEDURE — 97140 MANUAL THERAPY 1/> REGIONS: CPT | Performed by: PHYSICAL THERAPIST

## 2022-09-12 PROCEDURE — 97110 THERAPEUTIC EXERCISES: CPT | Performed by: PHYSICAL THERAPIST

## 2022-09-12 PROCEDURE — 97112 NEUROMUSCULAR REEDUCATION: CPT | Performed by: PHYSICAL THERAPIST

## 2022-09-12 NOTE — FLOWSHEET NOTE
The [facility], [address], [address] [place], [place]         Physical Therapy Treatment Note/ Progress Report:   Date:  2022    Patient Name:  Jennifer Virk    :  1970  MRN: [id number]  Physician Information:     Medical/Treatment Diagnosis Information:    Medical Diagnosis: spondylolisthesis M43.16, spondylosis M47.616, lumbar fusion Z98.1                                          Treatment Diagnosis:  Right hip pain M25.551 , Lumbar pain M54.50  [] Conservative / [] Surgical - DOS:    Insurance/Certification information:     Number of Comorbidities:  []0     [x]1-2    []3+    Has the plan of care been signed (Y/N):        []  Yes                    [x]  No          Is this a Progress Report:        [x]  Yes                    []  No       If Yes:  Date Range for reporting period:  Beginnin2022  Endin2022     Progress report will be due (10 Rx or 30 days ): 9829        Recertification will be due (POC Duration  / 90 days ): 10/1/2022    Latex Allergy:  [x]NO      []YES  Preferred Language for Healthcare:   [x]English       []Other:      Visit # Insurance Allowable   5 8       SUBJECTIVE:  Working on proper deadlift mechanics; sore in low back- muscle sore.     OBJECTIVE:    Test used Initial score Current Score   Pain Summary VAS 1-7/10      Functional questionnaire FOTO lumbar 63/100        ROM      Current   flexion  50 hamstring tightness  flat lower lumbar, rounded thoracic  continued rounded thoracic posture   extension  13     15   side bend  WNL       Seated rotation   Restriction R compared to L   rotation Slight restriction L    restriction to L     Observation:  Palpation:      RESTRICTIONS/PRECAUTIONS: L5-S1 fusion, L ACLR, R THR    Exercises/Interventions:     Therapeutic Ex sets/reps comments   Supine Hamstring (S)     Supine Lat Ham (S)     Q/L (S)     LTR     Supine Piriformis (S)     Supine Hip flexor (S)          prone          R SL rotation stretch with breath x5    Cat cow X5     Seated lower thoracic ext at wall with foam roll x5    1/2 kneel thoracic rot at wall dndX8 R/L    1/2 paloff press X12 R/L Double BTB   Manual Intervention      IASTM paraspinals    MFD lower and mid thoracic    Thoracic PAs 7 minT8-T12  4 min L  UPA T7-T10 Gr 3   STW L paraspinals T7-T10 5 min                        NMR re-education      TA activation, TT march    Lifting mechanics  Deadlift mechanics 3 min  Mirror cues/dowel   Deadlift to box at knee height 2x10  2x10  20lb kb  25 lb kb   KDL NV           Therapeutic Exercise and NMR EXR  [] (60541) Provided verbal/tactile cueing for activities related to strengthening, flexibility, endurance, ROM  for improvements in proximal hip and core control with self care, mobility, lifting and ambulation.  [] (75730) Provided verbal/tactile cueing for activities related to improving balance, coordination, kinesthetic sense, posture, motor skill, proprioception  to assist with core control in self care, mobility, lifting, and ambulation.      Therapeutic Activities:    [] (38651 or 07246) Provided verbal/tactile cueing for activities related to improving balance, coordination, kinesthetic sense, posture, motor skill, proprioception and motor activation to allow for proper function  with self care and ADLs  [] (11189) Provided training and instruction to the patient for proper core and proximal hip recruitment and positioning with ambulation re-education     Home Exercise Program:    [x] (52943) Reviewed/Progressed HEP activities related to strengthening, flexibility, endurance, ROM of core, proximal hip and LE for functional self-care, mobility, lifting and ambulation   [] (16935) Reviewed/Progressed HEP activities related to improving balance, coordination, kinesthetic sense, posture, motor skill, proprioception of core, proximal hip and LE for self care, mobility, lifting, and ambulation Manual Treatments:  PROM / STM / Oscillations-Mobs:  G-I, II, III, IV (PA's, Inf., Post.)  [x] (68800) Provided manual therapy to mobilize proximal hip and LS spine soft tissue/joints for the purpose of modulating pain, promoting relaxation,  increasing ROM, reducing/eliminating soft tissue swelling/inflammation/restriction, improving soft tissue extensibility and allowing for proper ROM for normal function with self care, mobility, lifting and ambulation. Modalities:      [] GR/ESU 15 min    [] GR 15 min  [] ESU     [] CP    [] MHP    [] declined  Charges:  Timed Code Treatment Minutes: 40   Total Treatment Minutes: 40     [x] EVAL (LOW) 83556 (typically 20 minutes face-to-face)  [] EVAL (MOD) 63936 (typically 30 minutes face-to-face)  [] EVAL (HIGH) 71841 (typically 45 minutes face-to-face)  [] RE-EVAL     [x] CW(66459) x   1  [] IONTO  [x] NMR (86825) x   1  [] VASO  [x] Manual (57596) x   1   [] Other:  [] TA x      [] Mech Traction (66267)  [] ES(attended) (55532)      [] ES (un) (76897):     Goals:   Long Term Goals: To be achieved in: 8 weeks  1. Disability index score of 70/100 or more for the FOTO lumbar  to assist with reaching prior level of function. [] Progressing: [] Met: [x] Not Met: [] Adjusted  2. Patient will demonstrate increased AROM to WNL, good LS mobility, good hip ROM to allow for proper joint functioning as indicated by patients Functional Deficits. [x] Progressing: continues[] Met: [] Not Met: [] Adjusted  3. Patient will demonstrate an increase in Strength to good proximal hip and core activation to allow for proper functional mobility as indicated by patients Functional Deficits. [x] Progressing: [] Met: [] Not Met: [] Adjusted  4. Patient will return to standing and sitting endurance (>2 hours for travel) functional activities without increased symptoms or restriction. [x] Progressing: [] Met: [] Not Met: [] Adjusted  5.  Pt will demonstrate appropriate lifting mechanics

## 2022-09-26 ENCOUNTER — HOSPITAL ENCOUNTER (OUTPATIENT)
Dept: PHYSICAL THERAPY | Age: 52
Setting detail: THERAPIES SERIES
Discharge: HOME | End: 2022-09-26
Payer: COMMERCIAL

## 2022-09-26 PROCEDURE — 97112 NEUROMUSCULAR REEDUCATION: CPT | Performed by: PHYSICAL THERAPIST

## 2022-09-26 PROCEDURE — 97140 MANUAL THERAPY 1/> REGIONS: CPT | Performed by: PHYSICAL THERAPIST

## 2022-09-26 PROCEDURE — 97110 THERAPEUTIC EXERCISES: CPT | Performed by: PHYSICAL THERAPIST

## 2022-09-26 NOTE — FLOWSHEET NOTE
The [facility], [address] [facility], [address] [place], [place]         Physical Therapy Treatment Note/ Progress Report:   Date:  2022    Patient Name:  Barbara Green    :  1970  MRN: [id number]  Physician Information:     Medical/Treatment Diagnosis Information:    Medical Diagnosis: spondylolisthesis M43.16, spondylosis M47.616, lumbar fusion Z98.1                                          Treatment Diagnosis:  Right hip pain M25.551 , Lumbar pain M54.50  [] Conservative / [] Surgical - DOS:    Insurance/Certification information:     Number of Comorbidities:  []0     [x]1-2    []3+    Has the plan of care been signed (Y/N):        []  Yes                    [x]  No          Is this a Progress Report:        [x]  Yes                    []  No       If Yes:  Date Range for reporting period:  Beginnin2022  Endin2022     Progress report will be due (10 Rx or 30 days ):         Recertification will be due (POC Duration  / 90 days ): 10/1/2022    Latex Allergy:  [x]NO      []YES  Preferred Language for Healthcare:   [x]English       []Other:      Visit # Insurance Allowable   6 8       SUBJECTIVE:  Working on proper deadlift mechanics; sore in low back- muscle sore.     OBJECTIVE:    Test used Initial score Current Score   Pain Summary VAS 1-7/10      Functional questionnaire FOTO lumbar 63/100        ROM      Current   flexion  50 hamstring tightness  flat lower lumbar, rounded thoracic  continued rounded thoracic posture   extension  13     15   side bend  WNL       Seated rotation   Restriction R compared to L   rotation Slight restriction L    restriction to L     Observation:  Palpation:      RESTRICTIONS/PRECAUTIONS: L5-S1 fusion, L ACLR, R THR    Exercises/Interventions:     Therapeutic Ex sets/reps comments   Supine Hamstring (S)     Supine Lat Ham (S)     Q/L (S)     LTR     Supine Piriformis (S)     Supine Hip flexor (S)          prone          R SL rotation stretch with breath x5    Cat cow X5     Seated lower thoracic ext at wall with foam roll x5    1/2 kneel thoracic rot at wall dndX8 R/L    1/2 paloff press X12 R/L Double BTB   Manual Intervention      IASTM paraspinals    MFD lower and mid thoracic    Thoracic PAs 7 minT8-T12  4 min L  UPA T7-T10 Gr 3   STW L paraspinals T7-T10 5 min                        NMR re-education      TA activation, TT march    Lifting mechanics  Deadlift mechanics   Mirror cues/dowel   Deadlift to box at knee height 20lb kb  25 lb kb   KDL 2x8 R/L Rearfoot elevated 6 in box          Therapeutic Exercise and NMR EXR  [] (91897) Provided verbal/tactile cueing for activities related to strengthening, flexibility, endurance, ROM  for improvements in proximal hip and core control with self care, mobility, lifting and ambulation.  [] (52031) Provided verbal/tactile cueing for activities related to improving balance, coordination, kinesthetic sense, posture, motor skill, proprioception  to assist with core control in self care, mobility, lifting, and ambulation.      Therapeutic Activities:    [] (58974 or 59273) Provided verbal/tactile cueing for activities related to improving balance, coordination, kinesthetic sense, posture, motor skill, proprioception and motor activation to allow for proper function  with self care and ADLs  [] (06136) Provided training and instruction to the patient for proper core and proximal hip recruitment and positioning with ambulation re-education     Home Exercise Program:    [x] (23630) Reviewed/Progressed HEP activities related to strengthening, flexibility, endurance, ROM of core, proximal hip and LE for functional self-care, mobility, lifting and ambulation   [] (88969) Reviewed/Progressed HEP activities related to improving balance, coordination, kinesthetic sense, posture, motor skill, proprioception of core, proximal hip and LE for self care, mobility, lifting, and ambulation      Manual Treatments:  PROM / STM / Oscillations-Mobs:  G-I, II, III, IV (PA's, Inf., Post.)  [x] (37734) Provided manual therapy to mobilize proximal hip and LS spine soft tissue/joints for the purpose of modulating pain, promoting relaxation,  increasing ROM, reducing/eliminating soft tissue swelling/inflammation/restriction, improving soft tissue extensibility and allowing for proper ROM for normal function with self care, mobility, lifting and ambulation. Modalities:      [] GR/ESU 15 min    [] GR 15 min  [] ESU     [] CP    [] MHP    [] declined  Charges:  Timed Code Treatment Minutes: 40   Total Treatment Minutes: 40     [x] EVAL (LOW) 01544 (typically 20 minutes face-to-face)  [] EVAL (MOD) 99547 (typically 30 minutes face-to-face)  [] EVAL (HIGH) 27398 (typically 45 minutes face-to-face)  [] RE-EVAL     [x] SC(43914) x   1  [] IONTO  [x] NMR (24485) x   1  [] VASO  [x] Manual (40952) x   1   [] Other:  [] TA x      [] Mech Traction (00333)  [] ES(attended) (22398)      [] ES (un) (07365):     Goals:   Long Term Goals: To be achieved in: 8 weeks  1. Disability index score of 70/100 or more for the FOTO lumbar  to assist with reaching prior level of function. [] Progressing: [] Met: [x] Not Met: [] Adjusted  2. Patient will demonstrate increased AROM to WNL, good LS mobility, good hip ROM to allow for proper joint functioning as indicated by patients Functional Deficits. [x] Progressing: continues[] Met: [] Not Met: [] Adjusted  3. Patient will demonstrate an increase in Strength to good proximal hip and core activation to allow for proper functional mobility as indicated by patients Functional Deficits. [x] Progressing: [] Met: [] Not Met: [] Adjusted  4. Patient will return to standing and sitting endurance (>2 hours for travel) functional activities without increased symptoms or restriction. [x] Progressing: [] Met: [] Not Met: [] Adjusted  5.  Pt will demonstrate appropriate lifting mechanics in order to lift >/=10lbs from floor to waist height. [x] Progressing: [] Met: [] Not Met: [] Adjusted        Overall Progression Towards Functional goals/ Treatment Progress Update:  [x] Patient is progressing as expected towards functional goals listed. [] Progression is slowed due to complexities/Impairments listed. [] Progression has been slowed due to co-morbidities. [] Plan just implemented, too soon to assess goals progression <30days   [] Goals require adjustment due to lack of progress  [] Patient is not progressing as expected and requires additional follow up with physician  [] Other     Prognosis for POC:     [x] Good          [] Fair             [] Poor        Patient requires continued skilled intervention:         [x] Yes             [] No     ASSESSMENT:  Challenged by [facility] addition. Treatment/Activity Tolerance:  [x] Patient able to complete treatment  [] Patient limited by fatigue  [] Patient limited by pain                     [] Patient limited by other medical complications  [] Other:          PLAN: See eval  [x] Continue per plan of care [] Alter current plan (see comments)  [] Plan of care initiated [] Hold pending MD visit [] Discharge    Electronically signed by: Boni Smalls PT DPT       Note: If patient does not return for scheduled/ recommended follow up visits, this note will serve as a discharge from care along with most recent update on progress.

## 2022-10-03 ENCOUNTER — HOSPITAL ENCOUNTER (OUTPATIENT)
Dept: PHYSICAL THERAPY | Age: 52
Setting detail: THERAPIES SERIES
Discharge: HOME | End: 2022-10-03
Payer: COMMERCIAL

## 2022-10-03 PROCEDURE — 97110 THERAPEUTIC EXERCISES: CPT | Performed by: PHYSICAL THERAPIST

## 2022-10-03 PROCEDURE — 97112 NEUROMUSCULAR REEDUCATION: CPT | Performed by: PHYSICAL THERAPIST

## 2022-10-03 PROCEDURE — 97140 MANUAL THERAPY 1/> REGIONS: CPT | Performed by: PHYSICAL THERAPIST

## 2022-10-03 NOTE — FLOWSHEET NOTE
The [facility], [address] [facility], [address] [place], [place]         Physical Therapy Treatment Note/ Progress Report:   Date:  10/3/2022    Patient Name:  Kiran Last    :  1970  MRN: [id number]  Physician Information:     Medical/Treatment Diagnosis Information:    Medical Diagnosis: spondylolisthesis M43.16, spondylosis M47.616, lumbar fusion Z98.1                                          Treatment Diagnosis:  Right hip pain M25.551 , Lumbar pain M54.50  [] Conservative / [] Surgical - DOS:    Insurance/Certification information:     Number of Comorbidities:  []0     [x]1-2    []3+    Has the plan of care been signed (Y/N):        []  Yes                    [x]  No          Is this a Progress Report:        [x]  Yes                    []  No       If Yes:  Date Range for reporting period:  Beginnin2022  Endin2022     Progress report will be due (10 Rx or 30 days ):         Recertification will be due (POC Duration  / 90 days ): 10/1/2022    Latex Allergy:  [x]NO      []YES  Preferred Language for Healthcare:   [x]English       []Other:      Visit # Insurance Allowable   7  POC NV 8       SUBJECTIVE:  Pt reports he feels the anterior hip get tight during the RDL progression.     OBJECTIVE:    Test used Initial score Current Score   Pain Summary VAS 1-7/10      Functional questionnaire FOTO lumbar 63/100        ROM      Current   flexion  50 hamstring tightness  flat lower lumbar, rounded thoracic  continued rounded thoracic posture   extension  13     15   side bend  WNL       Seated rotation   Restriction R compared to L   rotation Slight restriction L    restriction to L     Observation:  Palpation:      RESTRICTIONS/PRECAUTIONS: L5-S1 fusion, L ACLR, R THR    Exercises/Interventions:     Therapeutic Ex sets/reps comments   Supine Hamstring (S)     Supine Lat Ham (S)     Q/L (S)     LTR     Supine Piriformis (S)     Supine Hip flexor (S)     Reformer DL squat, SL squat X10 ea 1R 1B  1R 1Y   prone     SB bridge plank, alt bridge :5x10, x8    R SL rotation stretch with breath x5    Cat cow X5     Seated lower thoracic ext at wall with foam roll x5    1/2 kneel thoracic rot at wall dndX8 R/L    1/2 paloff press X12 R/L Double BTB   Manual Intervention      IASTM paraspinals    MFD lower and mid thoracic    Thoracic PAs 7 minT8-T12  4 min L  UPA T7-T10 Gr 3   STW L paraspinals T7-T10 5 min                        NMR re-education      TA activation, TT march    Lifting mechanics  Deadlift mechanics   Mirror cues/dowel   Deadlift to box at knee height 20lb kb  25 lb kb   KDL 2x8 R/L Rearfoot elevated 6 in box          Therapeutic Exercise and NMR EXR  [] (09166) Provided verbal/tactile cueing for activities related to strengthening, flexibility, endurance, ROM  for improvements in proximal hip and core control with self care, mobility, lifting and ambulation.  [] (31470) Provided verbal/tactile cueing for activities related to improving balance, coordination, kinesthetic sense, posture, motor skill, proprioception  to assist with core control in self care, mobility, lifting, and ambulation.      Therapeutic Activities:    [] (11109 or 91434) Provided verbal/tactile cueing for activities related to improving balance, coordination, kinesthetic sense, posture, motor skill, proprioception and motor activation to allow for proper function  with self care and ADLs  [] (80857) Provided training and instruction to the patient for proper core and proximal hip recruitment and positioning with ambulation re-education     Home Exercise Program:    [x] (39849) Reviewed/Progressed HEP activities related to strengthening, flexibility, endurance, ROM of core, proximal hip and LE for functional self-care, mobility, lifting and ambulation   [] (84375) Reviewed/Progressed HEP activities related to improving balance, coordination, kinesthetic sense, posture, motor skill, proprioception of core, proximal hip and LE for self care, mobility, lifting, and ambulation      Manual Treatments:  PROM / STM / Oscillations-Mobs:  G-I, II, III, IV (PA's, Inf., Post.)  [x] (63711) Provided manual therapy to mobilize proximal hip and LS spine soft tissue/joints for the purpose of modulating pain, promoting relaxation,  increasing ROM, reducing/eliminating soft tissue swelling/inflammation/restriction, improving soft tissue extensibility and allowing for proper ROM for normal function with self care, mobility, lifting and ambulation. Modalities:      [] GR/ESU 15 min    [] GR 15 min  [] ESU     [] CP    [] MHP    [] declined  Charges:  Timed Code Treatment Minutes: 40   Total Treatment Minutes: 40     [x] EVAL (LOW) 08392 (typically 20 minutes face-to-face)  [] EVAL (MOD) 28843 (typically 30 minutes face-to-face)  [] EVAL (HIGH) 39385 (typically 45 minutes face-to-face)  [] RE-EVAL     [x] NZ(13032) x   1  [] IONTO  [x] NMR (68641) x   1  [] VASO  [x] Manual (12949) x   1   [] Other:  [] TA x      [] Mech Traction (63243)  [] ES(attended) (03712)      [] ES (un) (99823):     Goals:   Long Term Goals: To be achieved in: 8 weeks  1. Disability index score of 70/100 or more for the FOTO lumbar  to assist with reaching prior level of function. [] Progressing: [] Met: [x] Not Met: [] Adjusted  2. Patient will demonstrate increased AROM to WNL, good LS mobility, good hip ROM to allow for proper joint functioning as indicated by patients Functional Deficits. [x] Progressing: continues[] Met: [] Not Met: [] Adjusted  3. Patient will demonstrate an increase in Strength to good proximal hip and core activation to allow for proper functional mobility as indicated by patients Functional Deficits. [x] Progressing: [] Met: [] Not Met: [] Adjusted  4. Patient will return to standing and sitting endurance (>2 hours for travel) functional activities without increased symptoms or restriction. [x] Progressing: [] Met: [] Not Met: [] Adjusted  5. Pt will demonstrate appropriate lifting mechanics in order to lift >/=10lbs from floor to waist height. [x] Progressing: [] Met: [] Not Met: [] Adjusted        Overall Progression Towards Functional goals/ Treatment Progress Update:  [x] Patient is progressing as expected towards functional goals listed. [] Progression is slowed due to complexities/Impairments listed. [] Progression has been slowed due to co-morbidities. [] Plan just implemented, too soon to assess goals progression <30days   [] Goals require adjustment due to lack of progress  [] Patient is not progressing as expected and requires additional follow up with physician  [] Other     Prognosis for POC:     [x] Good          [] Fair             [] Poor        Patient requires continued skilled intervention:         [x] Yes             [] No     ASSESSMENT:  Cues for posterior hip shift and weight to the heel to unload anterior hip. Fatigue with SB hip bridge and alt bridge. Trialed reformer without issue. Treatment/Activity Tolerance:  [x] Patient able to complete treatment  [] Patient limited by fatigue  [] Patient limited by pain                     [] Patient limited by other medical complications  [] Other:          PLAN: See eval  [x] Continue per plan of care [] Alter current plan (see comments)  [] Plan of care initiated [] Hold pending MD visit [] Discharge    Electronically signed by: Donovan Brito PT DPT       Note: If patient does not return for scheduled/ recommended follow up visits, this note will serve as a discharge from care along with most recent update on progress.

## 2022-10-10 ENCOUNTER — HOSPITAL ENCOUNTER (OUTPATIENT)
Dept: PHYSICAL THERAPY | Age: 52
Setting detail: THERAPIES SERIES
Discharge: HOME | End: 2022-10-10
Payer: COMMERCIAL

## 2022-10-10 PROCEDURE — 97110 THERAPEUTIC EXERCISES: CPT | Performed by: PHYSICAL THERAPIST

## 2022-10-10 PROCEDURE — 97140 MANUAL THERAPY 1/> REGIONS: CPT | Performed by: PHYSICAL THERAPIST

## 2022-10-10 PROCEDURE — 97112 NEUROMUSCULAR REEDUCATION: CPT | Performed by: PHYSICAL THERAPIST

## 2022-10-10 NOTE — PLAN OF CARE
The [facility], [address] [facility], [address], [place]         Physical Therapy Treatment Note/ Progress Report:   Date:  10/10/2022    Patient Name:  Olinda Read    :  1970  MRN: [id number]  Physician Information:   Charity Brink  Medical/Treatment Diagnosis Information:    Medical Diagnosis: spondylolisthesis M43.16, spondylosis M47.616, lumbar fusion Z98.1                                          Treatment Diagnosis:  Right hip pain M25.551 , Lumbar pain M54.50  [] Conservative / [] Surgical - DOS:    Insurance/Certification information:     Number of Comorbidities:  []0     [x]1-2    []3+    Has the plan of care been signed (Y/N):        []  Yes                    [x]  No          Is this a Progress Report:        [x]  Yes                    []  No       If Yes:  Date Range for reporting period:  Beginnin2022  Ending: 10/11/2022     Progress report will be due (10 Rx or 30 days ):         Recertification will be due (POC Duration  / 90 days ): 2022    Latex Allergy:  [x]NO      []YES  Preferred Language for Healthcare:   [x]English       []Other:      Visit # Insurance Allowable   8 8       SUBJECTIVE:  Pt reports he is feeling looser.     OBJECTIVE:    Test used Initial score Current Score   Pain Summary VAS 1-7/10      Functional questionnaire FOTO lumbar 63/100  65/100      ROM      Current   flexion  50 hamstring tightness  flat lower lumbar, rounded thoracic  continued rounded thoracic posture   extension  13     15   side bend  WNL       Seated rotation   symmetrical   rotation Slight restriction L    restriction to L- 70%      Observation:  Palpation:      RESTRICTIONS/PRECAUTIONS: L5-S1 fusion, L ACLR, R THR    Exercises/Interventions:     Therapeutic Ex sets/reps comments   Supine Hamstring (S)     Supine Lat Ham (S)     Q/L (S)     LTR     Supine Piriformis (S)     Supine Hip flexor (S)     Reformer DL squat, SL squat X10 ea 1R 1B  1R 1Y   prone     SB bridge plank, alt bridge :5x10, x8    R SL rotation stretch with breath x5    Cat cow X5     Seated lower thoracic ext at wall with foam roll x5    1/2 kneel thoracic rot at wall dndX8 R/L    1/2 paloff press X12 R/L Double BTB   Manual Intervention      IASTM paraspinals    MFD lower and mid thoracic    Thoracic PAs 7 minT8-T12  4 min L  UPA T7-T10 Gr 3   STW L paraspinals T7-T10 5 min                        NMR re-education      TA activation, TT march    Lifting mechanics  Deadlift mechanics   Mirror cues/dowel   Deadlift to box at knee height 20lb kb  25 lb kb   KDL 2x8 R/L Rearfoot elevated 6 in box          Therapeutic Exercise and NMR EXR  [] (16488) Provided verbal/tactile cueing for activities related to strengthening, flexibility, endurance, ROM  for improvements in proximal hip and core control with self care, mobility, lifting and ambulation.  [] (14471) Provided verbal/tactile cueing for activities related to improving balance, coordination, kinesthetic sense, posture, motor skill, proprioception  to assist with core control in self care, mobility, lifting, and ambulation.      Therapeutic Activities:    [] (86721 or 82199) Provided verbal/tactile cueing for activities related to improving balance, coordination, kinesthetic sense, posture, motor skill, proprioception and motor activation to allow for proper function  with self care and ADLs  [] (82962) Provided training and instruction to the patient for proper core and proximal hip recruitment and positioning with ambulation re-education     Home Exercise Program:    [x] (06940) Reviewed/Progressed HEP activities related to strengthening, flexibility, endurance, ROM of core, proximal hip and LE for functional self-care, mobility, lifting and ambulation   [] (76056) Reviewed/Progressed HEP activities related to improving balance, coordination, kinesthetic sense, posture, motor skill, proprioception of core, proximal hip and LE for self care, mobility, lifting, and ambulation      Manual Treatments:  PROM / STM / Oscillations-Mobs:  G-I, II, III, IV (PA's, Inf., Post.)  [x] (44769) Provided manual therapy to mobilize proximal hip and LS spine soft tissue/joints for the purpose of modulating pain, promoting relaxation,  increasing ROM, reducing/eliminating soft tissue swelling/inflammation/restriction, improving soft tissue extensibility and allowing for proper ROM for normal function with self care, mobility, lifting and ambulation. Modalities:      [] GR/ESU 15 min    [] GR 15 min  [] ESU     [] CP    [] MHP    [] declined  Charges:  Timed Code Treatment Minutes: 40   Total Treatment Minutes: 40     [x] EVAL (LOW) 42704 (typically 20 minutes face-to-face)  [] EVAL (MOD) 14109 (typically 30 minutes face-to-face)  [] EVAL (HIGH) 56071 (typically 45 minutes face-to-face)  [] RE-EVAL     [x] QC(37021) x   1  [] IONTO  [x] NMR (64858) x   1  [] VASO  [x] Manual (61791) x   1   [] Other:  [] TA x      [] Mech Traction (96624)  [] ES(attended) (36246)      [] ES (un) (43173):     Goals:   Long Term Goals: To be achieved in: 8 weeks  1. Disability index score of 70/100 or more for the FOTO lumbar  to assist with reaching prior level of function. [] Progressing: [] Met: [x] Not Met: [] Adjusted  2. Patient will demonstrate increased AROM to WNL, good LS mobility, good hip ROM to allow for proper joint functioning as indicated by patients Functional Deficits. [x] Progressing: continues[] Met: [] Not Met: [] Adjusted  3. Patient will demonstrate an increase in Strength to good proximal hip and core activation to allow for proper functional mobility as indicated by patients Functional Deficits. [x] Progressing: [] Met: [] Not Met: [] Adjusted  4. Patient will return to standing and sitting endurance (>2 hours for travel) functional activities without increased symptoms or restriction.    [] Progressing: [x] Met: [] Not Met: [] Adjusted  5. Pt will demonstrate appropriate lifting mechanics in order to lift >/=10lbs from floor to waist height. [x] Progressing: [] Met: [] Not Met: [] Adjusted        Overall Progression Towards Functional goals/ Treatment Progress Update:  [x] Patient is progressing as expected towards functional goals listed. [] Progression is slowed due to complexities/Impairments listed. [] Progression has been slowed due to co-morbidities. [] Plan just implemented, too soon to assess goals progression <30days   [] Goals require adjustment due to lack of progress  [] Patient is not progressing as expected and requires additional follow up with physician  [] Other     Prognosis for POC:     [x] Good          [] Fair             [] Poor        Patient requires continued skilled intervention:         [x] Yes             [] No     ASSESSMENT:  Pt with improved awareness of lifting mechanics and appropriate utilization of lumbar musculature instead of overloading thoracic musculature. Functionally, pt is limited in heavy lifting activities around home. He will benefit from PT to continue lifting mechanics progression and increasing strength. Treatment/Activity Tolerance:  [x] Patient able to complete treatment  [] Patient limited by fatigue  [] Patient limited by pain                     [] Patient limited by other medical complications  [] Other:          PLAN: See eval  [x] Continue per plan of care [] Alter current plan (see comments)  [] Plan of care initiated [] Hold pending MD visit [] Discharge    Electronically signed by: Dakota William PT DPT       Note: If patient does not return for scheduled/ recommended follow up visits, this note will serve as a discharge from care along with most recent update on progress.

## 2022-10-24 ENCOUNTER — HOSPITAL ENCOUNTER (OUTPATIENT)
Dept: PHYSICAL THERAPY | Age: 52
Setting detail: THERAPIES SERIES
Discharge: HOME | End: 2022-10-24
Payer: COMMERCIAL

## 2022-10-25 NOTE — FLOWSHEET NOTE
The [address] [facility],[address], [address], [address] [place], [place]      Physical Therapy  Cancellation/No-show Note  Patient Name:  Ellen Oneill  :  1970   Date:  10/25/2022  Cancelled visits to date: 1  No-shows to date: 0    For today's appointment patient:  [x]  Cancelled  []  Rescheduled appointment  []  No-show     Reason given by patient:  []  Patient ill  []  Conflicting appointment  []  No transportation    [x]  Conflict with work  []  No reason given  []  Other:     Comments:      Electronically signed by:  Lee Betancur PT DPT

## 2024-02-20 NOTE — FLOWSHEET NOTE
February 20, 2024     Patient: Marisela Funez   YOB: 2019   Date of Visit: 2/20/2024       To Whom It May Concern:    Marisela Funez was seen in my clinic on 2/20/2024 at 10:30 am. Please excuse Marisela for her absence from school on this day to make the appointment.    If you have any questions or concerns, please don't hesitate to call.         Sincerely,         Yarelis Mancia MD        CC: No Recipients   compared to L; standing hip ER deficits    RESTRICTIONS/PRECAUTIONS:  HTN, OA    Exercises/Interventions:     Therapeutic Ex (02511) Sets/sec Reps Notes/CUES   Pt ed: posture with new chair, hip motor control without lumbar compensation 8 min     Quad stretch on chair  4 x :30           SL ABD  Prone hip ext with glut firing ed                  Supine SKTC flexion x20     KDL with SB on table x10 R/L           gastroc KDL     4 in step post reach  Minimal knee flexion to limit knee pain         Standing HF 20# inf glide HEP  Quad fatigue   Manual Intervention (97566)      Lateral HS release      CFM HF/RF CFM 5 min/5 min     Hip flexion  With inf glide      hypervolt    TFL, VL, RF, incision   L SL ADD \"Stick\"  L Passive prone stretch IR stretch   L MRE prone IR activation                NMR re-education (96097)   CUES NEEDED   Prone ADD + hip IR iso pillow under hips x20  OVL   Prone SL HSC + YTB lat hip iso 2x10 R/L  Pillow under hips   Prone IR/ER MRE ecc  2x12 R lengthening into ER  3  Post lateral HS STW   Bosu ATW x5 R/L  Heels at small [place]   Kneeling rev lean   Quad lengtheing   Seated hip flexion self inf glide mob 3x10     Step up with rotation YTB for knee RNT YTB    RXX, RWX, LXX chest pass rebounder     airex R tandem stance chest pass                TB ext with march  GTB   TB ext march YTB ABD, IR act  x5 each  YTB- preventing IR of hip during march                               Therapeutic Exercise and NMR EXR  [] (22279) Provided verbal/tactile cueing for activities related to strengthening, flexibility, endurance, ROM for improvements in LE, proximal hip, and core control with self care, mobility, lifting, ambulation.  [] (78497) Provided verbal/tactile cueing for activities related to improving balance, coordination, kinesthetic sense, posture, motor skill, proprioception  to assist with LE, proximal hip, and core control in self care, mobility, lifting, ambulation and eccentric single leg control. NMR and Therapeutic Activities:    [] (10344 or 83790) Provided verbal/tactile cueing for activities related to improving balance, coordination, kinesthetic sense, posture, motor skill, proprioception and motor activation to allow for proper function of core, proximal hip and LE with self care and ADLs  [] (58016) Gait Re-education- Provided training and instruction to the patient for proper LE, core and proximal hip recruitment and positioning and eccentric body weight control with ambulation re-education including up and down stairs     Home Exercise Program:    [x] (90410) Reviewed/Progressed HEP activities related to strengthening, flexibility, endurance, ROM of core, proximal hip and LE for functional self-care, mobility, lifting and ambulation/stair navigation   [] (30727)Reviewed/Progressed HEP activities related to improving balance, coordination, kinesthetic sense, posture, motor skill, proprioception of core, proximal hip and LE for self care, mobility, lifting, and ambulation/stair navigation      Manual Treatments:  PROM / STM / Oscillations-Mobs:  G-I, II, III, IV (PA's, Inf., Post.)  [x] (27538) Provided manual therapy to mobilize LE, proximal hip and/or LS spine soft tissue/joints for the purpose of modulating pain, promoting relaxation,  increasing ROM, reducing/eliminating soft tissue swelling/inflammation/restriction, improving soft tissue extensibility and allowing for proper ROM for normal function with self care, mobility, lifting and ambulation. Modalities:     [] GAME READY (VASO)- for significant edema, swelling, pain control.      Charges:  Timed Code Treatment Minutes: 40   Total Treatment Minutes: 40     BWC time in/time out:   (and requires time in and out for each CPT code)    [] EVAL (LOW) 05477   [] EVAL (MOD) 60515  [] EVAL (HIGH) 42903   [] RE-EVAL     [x] NO(44189) x  1  [] IONTO  [x] NMR (49707) x  1   [] VASO  [x] Manual (42451) x 1    [] Other:  [] TA x      [] [facility] Traction (01476)  [] ES(attended) (77658)      [] ES (un) (15384):       GOALS:     Long Term Goals: To be achieved in: 6 weeks  1. Disability index score of 30% or less for the LEFS to assist with reaching prior level of function. [x] Progressing: [] Met: [] Not Met: [] Adjusted  2. Patient will demonstrate an increase in Strength to good proximal hip and core activation to allow for proper functional mobility as indicated by patients Functional Deficits. [x] Progressing: [] Met: [] Not Met: [] Adjusted  3. Patient will return to stair functional activities without increased symptoms or restriction. [] Progressing:  [x] Met: [] Not Met: [] Adjusted  4. Pt will return to golf. [] Progressing: [x] Met: [] Not Met: [] Adjusted         Overall Progression Towards Functional goals/ Treatment Progress Update:  [x] Patient is progressing as expected towards functional goals listed. [] Progression is slowed due to complexities/Impairments listed. [] Progression has been slowed due to co-morbidities. [] Plan just implemented, too soon to assess goals progression <30days   [] Goals require adjustment due to lack of progress  [] Patient is not progressing as expected and requires additional follow up with physician  [] Other    Prognosis for POC: [x] Good [] Fair  [] Poor      Patient requires continued skilled intervention: [x] Yes  [] No    Treatment/Activity Tolerance:  [x] Patient able to complete treatment  [] Patient limited by fatigue  [] Patient limited by pain    [] Patient limited by other medical complications  [] Other:     ASSESSMENT: Focused on hip flexion control with neutral spine. Cues to decrease TFL activation during march.   Return to Play: (if applicable)   []  Stage 1: Intro to Strength   []  Stage 2: Return to Run and Strength   []  Stage 3: Return to Jump and Strength   []  Stage 4: Dynamic Strength and Agility   []  Stage 5: Sport Specific Training     []  Ready to Return to Play,